# Patient Record
Sex: FEMALE | Race: WHITE | Employment: OTHER | URBAN - METROPOLITAN AREA
[De-identification: names, ages, dates, MRNs, and addresses within clinical notes are randomized per-mention and may not be internally consistent; named-entity substitution may affect disease eponyms.]

---

## 2017-01-10 RX ORDER — MELOXICAM 15 MG/1
TABLET ORAL
Qty: 90 TAB | Refills: 3 | Status: SHIPPED | OUTPATIENT
Start: 2017-01-10 | End: 2017-04-28 | Stop reason: ALTCHOICE

## 2017-01-27 RX ORDER — LEVOTHYROXINE SODIUM 150 UG/1
TABLET ORAL
Qty: 90 TAB | Refills: 1 | Status: SHIPPED | OUTPATIENT
Start: 2017-01-27 | End: 2017-08-22 | Stop reason: SDUPTHER

## 2017-01-27 RX ORDER — LEVOTHYROXINE SODIUM 150 UG/1
TABLET ORAL
Qty: 90 TAB | Refills: 1 | Status: CANCELLED | OUTPATIENT
Start: 2017-01-27

## 2017-02-14 ENCOUNTER — OFFICE VISIT (OUTPATIENT)
Dept: INTERNAL MEDICINE CLINIC | Age: 67
End: 2017-02-14

## 2017-02-14 VITALS
WEIGHT: 159.8 LBS | OXYGEN SATURATION: 98 % | HEART RATE: 108 BPM | SYSTOLIC BLOOD PRESSURE: 132 MMHG | DIASTOLIC BLOOD PRESSURE: 82 MMHG | BODY MASS INDEX: 31.37 KG/M2 | HEIGHT: 60 IN | TEMPERATURE: 98.1 F | RESPIRATION RATE: 20 BRPM

## 2017-02-14 DIAGNOSIS — M17.12 PRIMARY OSTEOARTHRITIS OF LEFT KNEE: ICD-10-CM

## 2017-02-14 DIAGNOSIS — K21.9 GASTROESOPHAGEAL REFLUX DISEASE WITHOUT ESOPHAGITIS: ICD-10-CM

## 2017-02-14 DIAGNOSIS — E03.8 HYPOTHYROIDISM DUE TO HASHIMOTO'S THYROIDITIS: ICD-10-CM

## 2017-02-14 DIAGNOSIS — E06.3 HYPOTHYROIDISM DUE TO HASHIMOTO'S THYROIDITIS: ICD-10-CM

## 2017-02-14 DIAGNOSIS — Z01.818 PREOP EXAM FOR INTERNAL MEDICINE: Primary | ICD-10-CM

## 2017-02-14 NOTE — MR AVS SNAPSHOT
Visit Information Date & Time Provider Department Dept. Phone Encounter #  
 2/14/2017  2:30 PM Chilo Ornelas MD Internist of [address] [id number] Upcoming Health Maintenance Date Due DTaP/Tdap/Td series (1 - Tdap) 4/22/2011 MEDICARE YEARLY EXAM 2/2/2016 Pneumococcal 65+ Low/Medium Risk (2 of 2 - PPSV23) 11/11/2017 GLAUCOMA SCREENING Q2Y 12/17/2017 BREAST CANCER SCRN MAMMOGRAM 12/30/2018 COLONOSCOPY 1/16/2027 Allergies as of 2/14/2017  Review Complete On: 2/14/2017 By: Chilo Ornelas MD  
  
 Severity Noted Reaction Type Reactions Lipitor [Atorvastatin]    Unknown (comments) Myalgia Septra [Sulfamethoprim Ds]    Rash  
 Sulfa (Sulfonamide Antibiotics)  10/19/2012    Hives  
 respiratory distress Current Immunizations  Reviewed on 11/11/2016 Name Date Influenza Vaccine (Quad) PF 11/11/2016  2:31 PM, 11/9/2015  3:05 PM  
 Influenza Vaccine PF 10/29/2014  2:59 PM, 10/24/2013 11:18 AM  
 Influenza Vaccine Split 10/19/2012 10:16 AM, 10/11/2011 Influenza Vaccine Whole 9/24/2010 Pneumococcal Conjugate (PCV-13) 11/11/2016  2:31 PM  
 TD Vaccine 4/21/2011 Zoster Vaccine, Live 10/24/2013 11:20 AM  
  
 Not reviewed this visit You Were Diagnosed With   
  
 Codes Comments Preop exam for internal medicine    -  Primary ICD-10-CM: A01.826 ICD-9-CM: V72.83 Primary osteoarthritis of left knee     ICD-10-CM: M17.12 
ICD-9-CM: 715.16 Vitals BP Pulse Temp Resp Height(growth percentile) Weight(growth percentile) 132/82 (BP 1 Location: Left arm, BP Patient Position: Sitting) (!) 108 98.1 °F (36.7 °C) (Oral) 20 5' (1.524 m) 159 lb 12.8 oz (72.5 kg) SpO2 BMI OB Status Smoking Status 98% 31.21 kg/m2 Hysterectomy Never Smoker BMI and BSA Data Body Mass Index Body Surface Area  
 31.21 kg/m 2 1.75 m 2 Preferred Pharmacy Pharmacy Name Phone 42 [facility] 994 [phone] Your Updated Medication List  
  
   
This list is accurate as of: 2/14/17  3:02 PM.  Always use your most recent med list.  
  
  
  
  
 conjugated estrogens 0.625 mg tablet Commonly known as:  PREMARIN Take 1 Tab by mouth daily. esomeprazole 40 mg capsule Commonly known as:  NexIUM Take 1 Cap by mouth daily. levothyroxine 150 mcg tablet Commonly known as:  SYNTHROID  
take 1 tablet by mouth once daily  
  
 meloxicam 15 mg tablet Commonly known as:  MOBIC  
take 1 tablet by mouth once daily  
  
 traMADol 50 mg tablet Commonly known as:  ULTRAM  
Take 1 Tab by mouth every six (6) hours as needed for Pain. Max Daily Amount: 200 mg. Introducing [facility]! Dear Jennyfer Listen: Thank you for requesting a [employer] account. Our records indicate that you already have an active [employer] account. You can access your account anytime at [email or web address] Did you know that you can access your hospital and ER discharge instructions at any time in i[employer]O? You can also review all of your test results from your hospital stay or ER visit. Additional Information If you have questions, please visit the Frequently Asked Questions section of the [employer] website at [email or web address] Remember, [employer]O is NOT to be used for urgent needs. For medical emergencies, dial 911. Now available from your iPhone and Android! Please provide this summary of care documentation to your next provider. Your primary care clinician is listed as Sabino Mojica. If you have any questions after today's visit, please call [phone].

## 2017-02-14 NOTE — PROGRESS NOTES
Fabio Knight 1950, is a 78 y.o. female, who is seen today for preoperative evaluation prior to having left total knee replacement 7 days from today. She has had progressively more problems getting around with a painful knee over the last few years to the point where she is limited her activities somewhat more recently. She has been using meloxicam but stopped using that yesterday in preparation for the surgery. She rarely uses tramadol but sometimes to get to sleep at night she will use that. She otherwise feels quite well. Past Medical History   Diagnosis Date    Allergic rhinitis     Asthma     Diverticulosis     DJD (degenerative joint disease)     DJD (degenerative joint disease)     GERD (gastroesophageal reflux disease)     Headache(784.0)     Hyperlipidemia     Hypothyroidism     Overweight(278.02)      with weight gain    Right knee pain      Past Surgical History   Procedure Laterality Date    Hx appendectomy      Hx cholecystectomy      Endoscopy, colon, diagnostic      Hx hysterectomy      Hx tubal ligation      Hx tonsillectomy      Hx orthopaedic       left knee    Pr breast surgery procedure unlisted       reduction mammoplasty    Hx knee arthroscopy       Current Outpatient Prescriptions   Medication Sig Dispense Refill    levothyroxine (SYNTHROID) 150 mcg tablet take 1 tablet by mouth once daily 90 Tab 1    meloxicam (MOBIC) 15 mg tablet take 1 tablet by mouth once daily 90 Tab 3    traMADol (ULTRAM) 50 mg tablet Take 1 Tab by mouth every six (6) hours as needed for Pain. Max Daily Amount: 200 mg. 60 Tab 4    conjugated estrogens (PREMARIN) 0.625 mg tablet Take 1 Tab by mouth daily. 90 Tab 3    esomeprazole (NEXIUM) 40 mg capsule Take 1 Cap by mouth daily.  90 Cap 3   (she stopped using meloxicam yesterday.)    Allergies   Allergen Reactions    Lipitor [Atorvastatin] Unknown (comments)     Myalgia      Septra [Sulfamethoprim Ds] Rash    Sulfa (Sulfonamide Antibiotics) Hives     respiratory distress     Social History     Social History    Marital status:      Spouse name: N/A    Number of children: N/A    Years of education: N/A     Social History Main Topics    Smoking status: Never Smoker    Smokeless tobacco: Never Used    Alcohol use Yes      Comment: rarely    Drug use: No    Sexual activity: Not Asked     Other Topics Concern    None     Social History Narrative     Review of systems: She denies exertional discomfort in the chest neck jaw back or arm. No palpitations. She has no dyspnea on exertion PND orthopnea or edema. Visit Vitals    /82 (BP 1 Location: Left arm, BP Patient Position: Sitting)    Pulse (!) 108    Temp 98.1 °F (36.7 °C) (Oral)    Resp 20    Ht 5' (1.524 m)    Wt 159 lb 12.8 oz (72.5 kg)    SpO2 98%    BMI 31.21 kg/m2     Carotids are 2+ without bruits. No adenopathy or thyromegaly. Lungs are clear to percussion. Good breath sounds with no wheezing or crackles. Heart reveals a regular rhythm with normal S1 and S2 no murmur gallop click or rub. Apical impulse is not palpable. Abdomen is soft and nontender with no hepatosplenomegaly or masses and no bruits. Extremities reveal no clubbing cyanosis or edema. Pulses are 2+ throughout. EKG from November 11, 2016 shows normal sinus rhythm at 60 bpm, left axis deviation, slow R-wave progression and low voltage in the limb leads. Laboratory from November 5 showed normal CBC and basic chemistries. Assessment: End-stage osteoarthritis of the left knee, no major contraindications to proposed surgery. She is medically cleared for left total knee replacement and associated anesthetic. She will use tramadol as needed preoperatively and will continue to avoid meloxicam as well as all other nonsteroidal agents and aspirin. She may use Tylenol. She understands. #2. Hypothyroidism with normal TSH and clinically euthyroid.   She will continue levothyroxine 150 µg daily. #3.  GERD asymptomatic. She will continue Nexium 40 mg daily. Americo Merritt MD FACP    Please note: This document has been produced using voice recognition software. Unrecognized errors in transcription may be present.

## 2017-02-14 NOTE — PROGRESS NOTES
1. Have you been to the ER, urgent care clinic or hospitalized since your last visit?  no    2. Have you seen or consulted any other health care providers outside of the [address] [place] [address] since your last visit (Include any pap smears or colon screening)? Yes renée Olsen    Do you have an Advanced Directive? no    Would you like information on Advanced Directives?  no

## 2017-02-20 RX ORDER — TRAMADOL HYDROCHLORIDE 50 MG/1
TABLET ORAL
Qty: 60 TAB | Refills: 4 | Status: SHIPPED | OUTPATIENT
Start: 2017-02-20 | End: 2017-03-01

## 2017-03-28 RX ORDER — CIPROFLOXACIN 500 MG/1
500 TABLET ORAL 2 TIMES DAILY
Qty: 20 TAB | Refills: 0 | Status: SHIPPED | OUTPATIENT
Start: 2017-03-28 | End: 2017-04-05 | Stop reason: ALTCHOICE

## 2017-03-28 RX ORDER — METRONIDAZOLE 500 MG/1
500 TABLET ORAL 3 TIMES DAILY
Qty: 30 TAB | Refills: 0 | Status: SHIPPED | OUTPATIENT
Start: 2017-03-28 | End: 2017-04-05 | Stop reason: ALTCHOICE

## 2017-04-05 ENCOUNTER — OFFICE VISIT (OUTPATIENT)
Dept: INTERNAL MEDICINE CLINIC | Age: 67
End: 2017-04-05

## 2017-04-05 VITALS
BODY MASS INDEX: 29.09 KG/M2 | OXYGEN SATURATION: 97 % | HEIGHT: 60 IN | TEMPERATURE: 97.9 F | SYSTOLIC BLOOD PRESSURE: 124 MMHG | WEIGHT: 148.2 LBS | DIASTOLIC BLOOD PRESSURE: 80 MMHG | RESPIRATION RATE: 12 BRPM | HEART RATE: 102 BPM

## 2017-04-05 DIAGNOSIS — M17.12 PRIMARY OSTEOARTHRITIS OF LEFT KNEE: ICD-10-CM

## 2017-04-05 DIAGNOSIS — Z01.818 PREOP EXAM FOR INTERNAL MEDICINE: Primary | ICD-10-CM

## 2017-04-05 DIAGNOSIS — K57.33 DIVERTICULITIS LARGE INTESTINE W/O PERFORATION OR ABSCESS W/BLEEDING: ICD-10-CM

## 2017-04-05 NOTE — MR AVS SNAPSHOT
Visit Information Date & Time Provider Department Dept. Phone Encounter #  
 4/5/2017  2:30 PM Darren Barragan MD Internist of [address] [id number] Upcoming Health Maintenance Date Due DTaP/Tdap/Td series (1 - Tdap) 4/22/2011 MEDICARE YEARLY EXAM 2/2/2016 Pneumococcal 65+ Low/Medium Risk (2 of 2 - PPSV23) 11/11/2017 GLAUCOMA SCREENING Q2Y 12/17/2017 BREAST CANCER SCRN MAMMOGRAM 12/30/2018 COLONOSCOPY 1/16/2027 Allergies as of 4/5/2017  Review Complete On: 4/5/2017 By: Darren Barragan MD  
  
 Severity Noted Reaction Type Reactions Lipitor [Atorvastatin]    Unknown (comments) Myalgia Septra [Sulfamethoprim Ds]    Rash  
 Sulfa (Sulfonamide Antibiotics)  10/19/2012    Hives  
 respiratory distress Current Immunizations  Reviewed on 11/11/2016 Name Date Influenza Vaccine (Quad) PF 11/11/2016  2:31 PM, 11/9/2015  3:05 PM  
 Influenza Vaccine PF 10/29/2014  2:59 PM, 10/24/2013 11:18 AM  
 Influenza Vaccine Split 10/19/2012 10:16 AM, 10/11/2011 Influenza Vaccine Whole 9/24/2010 Pneumococcal Conjugate (PCV-13) 11/11/2016  2:31 PM  
 TD Vaccine 4/21/2011 Zoster Vaccine, Live 10/24/2013 11:20 AM  
  
 Not reviewed this visit You Were Diagnosed With   
  
 Codes Comments Preop exam for internal medicine    -  Primary ICD-10-CM: D80.949 ICD-9-CM: V72.83 Primary osteoarthritis of left knee     ICD-10-CM: M17.12 
ICD-9-CM: 715.16 Diverticulitis large intestine w/o perforation or abscess w/bleeding     ICD-10-CM: K57.33 
ICD-9-CM: 562.13 Vitals BP Pulse Temp Resp Height(growth percentile) Weight(growth percentile) 124/80 (!) 102 97.9 °F (36.6 °C) (Oral) 12 5' (1.524 m) 148 lb 3.2 oz (67.2 kg) SpO2 BMI OB Status Smoking Status 97% 28.94 kg/m2 Hysterectomy Never Smoker Vitals History BMI and BSA Data  Body Mass Index Body Surface Area  
 28.94 kg/m 2 1.69 m 2  
  
  
 [phone] Your Updated Medication List  
  
   
This list is accurate as of: 4/5/17  3:19 PM.  Always use your most recent med list.  
  
  
  
  
 aspirin 325 mg tablet Commonly known as:  ASPIRIN Take 325 mg by mouth two (2) times a day. conjugated estrogens 0.625 mg tablet Commonly known as:  PREMARIN Take 1 Tab by mouth daily. esomeprazole 40 mg capsule Commonly known as:  NexIUM Take 1 Cap by mouth daily. levothyroxine 150 mcg tablet Commonly known as:  SYNTHROID  
take 1 tablet by mouth once daily  
  
 meloxicam 15 mg tablet Commonly known as:  MOBIC  
take 1 tablet by mouth once daily NORCO 7.5-325 mg per tablet Generic drug:  HYDROcodone-acetaminophen Take 1 Tab by mouth every four (4) hours as needed for Pain. Introducing [facility]! Dear Lisa Simeon: Thank you for requesting a [employer] account. Our records indicate that you already have an active Learn[employer] account. You can access your account anytime at [email or web address] Did you know that you can access your hospital and ER discharge instructions at any time in LearnVest? You can also review all of your test results from your hospital stay or ER visit. Additional Information If you have questions, please visit the Frequently Asked Questions section of the LearnVest website at [email or web address] Remember, LearnVest is NOT to be used for urgent needs. For medical emergencies, dial 911. Now available from your iPhone and Android! Please provide this summary of care documentation to your next provider. Your primary care clinician is listed as Marium Garcia. If you have any questions after today's visit, please call [phone].

## 2017-04-05 NOTE — PROGRESS NOTES
Candi Pineda 1950, is a 78 y.o. female, who is seen today for preoperative evaluation prior to having left total knee replacement. She has had significant arthritis in both knees but the left has been worse than the right. Prior to having knee replacement she ended up with sepsis and infection in the left knee which was debrided and she was treated with intravenous antibiotics for several weeks. She is now  going to undergo total replacement, tentatively planned for April 11 to be done by Dr. Winsome Barker. She otherwise has been feeling quite well but has been experiencing quite a bit of pain in that knee. She is now using one half of a hydrocodone tablet every 4 hours through the day and that helps her considerably. She stopped using meloxicam about 2 months ago and has been off aspirin since April 1. Past Medical History:   Diagnosis Date    Allergic rhinitis     Asthma     Diverticulosis     DJD (degenerative joint disease)     DJD (degenerative joint disease)     GERD (gastroesophageal reflux disease)     Headache     Hyperlipidemia     Hypothyroidism     Overweight     with weight gain    Right knee pain      Past Surgical History:   Procedure Laterality Date    BREAST SURGERY PROCEDURE UNLISTED      reduction mammoplasty    ENDOSCOPY, COLON, DIAGNOSTIC      HX APPENDECTOMY      HX CHOLECYSTECTOMY      HX HYSTERECTOMY      HX KNEE ARTHROSCOPY      HX ORTHOPAEDIC      left knee    HX TONSILLECTOMY      HX TUBAL LIGATION       Current Outpatient Prescriptions   Medication Sig Dispense Refill    HYDROcodone-acetaminophen (NORCO) 7.5-325 mg per tablet Take 1 Tab by mouth every four (4) hours as needed for Pain.  levothyroxine (SYNTHROID) 150 mcg tablet take 1 tablet by mouth once daily 90 Tab 1    conjugated estrogens (PREMARIN) 0.625 mg tablet Take 1 Tab by mouth daily. 90 Tab 3    esomeprazole (NEXIUM) 40 mg capsule Take 1 Cap by mouth daily.  90 Cap 3    aspirin (ASPIRIN) 325 mg tablet Take 325 mg by mouth two (2) times a day.  meloxicam (MOBIC) 15 mg tablet take 1 tablet by mouth once daily 90 Tab 3   (she is using her Premarin 3 times a week and is not currently using meloxicam or aspirin.)    Allergies   Allergen Reactions    Lipitor [Atorvastatin] Unknown (comments)     Myalgia      Septra [Sulfamethoprim Ds] Rash    Sulfa (Sulfonamide Antibiotics) Hives     respiratory distress     Social History     Social History    Marital status:      Spouse name: N/A    Number of children: N/A    Years of education: N/A     Social History Main Topics    Smoking status: Never Smoker    Smokeless tobacco: Never Used    Alcohol use Yes      Comment: rarely    Drug use: No    Sexual activity: Not Asked     Other Topics Concern    None     Social History Narrative     Review of systems: She denies exertional discomfort in the chest neck jaw back or arm. She denies dyspnea on exertion PND orthopnea or edema. Visit Vitals    /80    Pulse (!) 102    Temp 97.9 °F (36.6 °C) (Oral)    Resp 12    Ht 5' (1.524 m)    Wt 148 lb 3.2 oz (67.2 kg)    SpO2 97%    BMI 28.94 kg/m2     Carotids are 2+ without bruits. Lungs are clear to percussion. Good breath sounds with no wheezing or crackles. Heart reveals a regular rhythm with normal S1 and S2 no murmur gallop click or rub. Apical impulse is not palpable. Abdomen is soft and nontender with no hepatosplenomegaly or masses and no bruits. Extremities reveal no clubbing cyanosis or edema. Pulses are 2+ throughout. She has a well-healed incision over the left knee but it does not fully extend. Assessment: End-stage DJD left knee with recent infection. There are no major contraindications to total knee replacement and she is medically cleared for the surgery and associated anesthetic. Our office will manage warfarin postop for a planned 4 week treatment duration.   She will remain off aspirin and all nonsteroidals and will rely on hydrocodone for pain control. Americo Bernard MD FACP    Please note: This document has been produced using voice recognition software. Unrecognized errors in transcription may be present.

## 2017-04-17 NOTE — TELEPHONE ENCOUNTER
From: Florina Mukherjee  To: Darren Barragan MD  Sent: 4/17/2017 11:28 AM EDT  Subject: Medication Renewal Request    Original authorizing provider: MD Florina Infante would like a refill of the following medications:  conjugated estrogens (PREMARIN) 0.625 mg tablet Darren Barragan MD]    Preferred pharmacy: [address] [place] [address] Street:

## 2017-04-28 ENCOUNTER — OFFICE VISIT (OUTPATIENT)
Dept: INTERNAL MEDICINE CLINIC | Age: 67
End: 2017-04-28

## 2017-04-28 VITALS
OXYGEN SATURATION: 95 % | BODY MASS INDEX: 27.61 KG/M2 | SYSTOLIC BLOOD PRESSURE: 110 MMHG | WEIGHT: 140.6 LBS | HEART RATE: 67 BPM | RESPIRATION RATE: 12 BRPM | HEIGHT: 60 IN | TEMPERATURE: 97.6 F | DIASTOLIC BLOOD PRESSURE: 82 MMHG

## 2017-04-28 DIAGNOSIS — F51.01 PRIMARY INSOMNIA: Primary | ICD-10-CM

## 2017-04-28 DIAGNOSIS — M25.562 LEFT KNEE PAIN, UNSPECIFIED CHRONICITY: ICD-10-CM

## 2017-04-28 RX ORDER — LORAZEPAM 1 MG/1
TABLET ORAL
Qty: 30 TAB | Refills: 0 | Status: SHIPPED | OUTPATIENT
Start: 2017-04-28 | End: 2017-05-23 | Stop reason: SDUPTHER

## 2017-04-28 NOTE — PROGRESS NOTES
Iona Jackson 1950, is a 78 y.o. female, who is seen today for reevaluation after recent left total knee replacement. The knee was replaced on April 11 and she is getting around on it better and better over time with her cane. She only uses Norco at bedtime. She typically is waking up about 2 hours after she goes to sleep and having some nausea and cannot sleep. She has not had swelling in the leg. She was treated with aspirin twice daily rather than warfarin. She will finish that in about 2 weeks. She did have a cough from the day she got out of the hospital until 5 days ago but is no longer coughing and is not short of breath. No chest pain. Past Medical History:   Diagnosis Date    Allergic rhinitis     Asthma     Diverticulosis     DJD (degenerative joint disease)     DJD (degenerative joint disease)     GERD (gastroesophageal reflux disease)     Headache     Hyperlipidemia     Hypothyroidism     Overweight     with weight gain    Right knee pain      Current Outpatient Prescriptions   Medication Sig Dispense Refill    LORazepam (ATIVAN) 1 mg tablet 1 tablet at bedtime as needed for sleep 30 Tab 0    conjugated estrogens (PREMARIN) 0.625 mg tablet Take 1 Tab by mouth daily. 90 Tab 3    aspirin (ASPIRIN) 325 mg tablet Take 325 mg by mouth two (2) times a day.  HYDROcodone-acetaminophen (NORCO) 7.5-325 mg per tablet Take 1 Tab by mouth every four (4) hours as needed for Pain.  levothyroxine (SYNTHROID) 150 mcg tablet take 1 tablet by mouth once daily 90 Tab 1    esomeprazole (NEXIUM) 40 mg capsule Take 1 Cap by mouth daily. 90 Cap 3     Visit Vitals    /82    Pulse 67    Temp 97.6 °F (36.4 °C) (Oral)    Resp 12    Ht 5' (1.524 m)    Wt 140 lb 9.6 oz (63.8 kg)    SpO2 95%    BMI 27.46 kg/m2     Lungs are clear to percussion. Good breath sounds with no wheezing or crackles.   The incision over the left knee is healing nicely, and is are opposed and strips are still in place. There is no redness. No edema in the lower leg ankle or foot. No tenderness in the calf. Pulses are 2+ in the foot on the left. Assessment: #1. Doing quite well after recent left knee replacement. She will continue rehabilitation and will continue aspirin twice daily for another 2 weeks. She will use Norco as needed at bedtime. #2. Insomnia. This may be partly related to nausea, you might be from Elena Asifwilbert.  I discussed this at length and she will try lorazepam 1 mg at bedtime over the next couple of weeks. The half-life of clonazepam is probably perfect for her problem, long enough but not so long as to make her sleepy the next day. Follow-up as previously planned    Otilio Schmitz. Abhijeet Garcia MD FACP    Please note: This document has been produced using voice recognition software. Unrecognized errors in transcription may be present. This visit lasted 25 minutes, greater than 50% spent counseling on caring for the knee, treating the insomnia, treating the pain.

## 2017-04-28 NOTE — MR AVS SNAPSHOT
Visit Information Date & Time Provider Department Dept. Phone Encounter #  
 4/28/2017  2:30 PM Aixa Carroll MD Internist of [address] [id number] Upcoming Health Maintenance Date Due DTaP/Tdap/Td series (1 - Tdap) 4/22/2011 MEDICARE YEARLY EXAM 2/2/2016 Pneumococcal 65+ Low/Medium Risk (2 of 2 - PPSV23) 11/11/2017 GLAUCOMA SCREENING Q2Y 12/17/2017 BREAST CANCER SCRN MAMMOGRAM 12/30/2018 COLONOSCOPY 1/16/2027 Allergies as of 4/28/2017  Review Complete On: 4/28/2017 By: Aixa Carroll MD  
  
 Severity Noted Reaction Type Reactions Lipitor [Atorvastatin]    Unknown (comments) Myalgia Septra [Sulfamethoprim Ds]    Rash  
 Sulfa (Sulfonamide Antibiotics)  10/19/2012    Hives  
 respiratory distress Current Immunizations  Reviewed on 11/11/2016 Name Date Influenza Vaccine (Quad) PF 11/11/2016  2:31 PM, 11/9/2015  3:05 PM  
 Influenza Vaccine PF 10/29/2014  2:59 PM, 10/24/2013 11:18 AM  
 Influenza Vaccine Split 10/19/2012 10:16 AM, 10/11/2011 Influenza Vaccine Whole 9/24/2010 Pneumococcal Conjugate (PCV-13) 11/11/2016  2:31 PM  
 TD Vaccine 4/21/2011 Zoster Vaccine, Live 10/24/2013 11:20 AM  
  
 Not reviewed this visit You Were Diagnosed With   
  
 Codes Comments Primary insomnia    -  Primary ICD-10-CM: F51.01 
ICD-9-CM: 307.42 Left knee pain, unspecified chronicity     ICD-10-CM: V78.732 ICD-9-CM: 719.46 Vitals BP Pulse Temp Resp Height(growth percentile) Weight(growth percentile) 110/82 67 97.6 °F (36.4 °C) (Oral) 12 5' (1.524 m) 140 lb 9.6 oz (63.8 kg) SpO2 BMI OB Status Smoking Status 95% 27.46 kg/m2 Hysterectomy Never Smoker Vitals History BMI and BSA Data Body Mass Index Body Surface Area  
 27.46 kg/m 2 1.64 m 2 Preferred Pharmacy Pharmacy Name Phone [address] [facility], [address] [place] [address] [phone] Your Updated Medication List  
  
   
This list is accurate as of: 17  2:31 PM.  Always use your most recent med list.  
  
  
  
  
 aspirin 325 mg tablet Commonly known as:  ASPIRIN Take 325 mg by mouth two (2) times a day. conjugated estrogens 0.625 mg tablet Commonly known as:  PREMARIN Take 1 Tab by mouth daily. esomeprazole 40 mg capsule Commonly known as:  NexIUM Take 1 Cap by mouth daily. levothyroxine 150 mcg tablet Commonly known as:  SYNTHROID  
take 1 tablet by mouth once daily LORazepam 1 mg tablet Commonly known as:  ATIVAN  
1 tablet at bedtime as needed for sleep NORCO 7.5-325 mg per tablet Generic drug:  HYDROcodone-acetaminophen Take 1 Tab by mouth every four (4) hours as needed for Pain. Prescriptions Printed Refills LORazepam (ATIVAN) 1 mg tablet 0 Si tablet at bedtime as needed for sleep Class: Print Introducing [facility]! Dear Citlalli Wheeler: Thank you for requesting a [facility] account. Our records indicate that you already have an active [facility] account. You can access your account anytime at [email or web address]. [email or web address] Did you know that you can access your hospital and ER discharge instructions at any time in [facility]? You can also review all of your test results from your hospital stay or ER visit. Additional Information If you have questions, please visit the Frequently Asked Questions section of the [facility] website at [email or web address] Remember, [facility] is NOT to be used for urgent needs. For medical emergencies, dial 911. Now available from your iPhone and Android! Please provide this summary of care documentation to your next provider. Your primary care clinician is listed as Maribell Knapp. Cande Sherwood. If you have any questions after today's visit, please call [phone].

## 2017-05-23 RX ORDER — LORAZEPAM 1 MG/1
TABLET ORAL
Qty: 30 TAB | Refills: 0 | OUTPATIENT
Start: 2017-05-23 | End: 2017-06-26 | Stop reason: SDUPTHER

## 2017-05-23 NOTE — TELEPHONE ENCOUNTER
From: Jorge Dhillon  To: Dani Zuleta MD  Sent: 5/23/2017 2:05 PM EDT  Subject: Medication Renewal Request    Original authorizing provider: MD Jorge Fontana would like a refill of the following medications:  LORazepam (ATIVAN) 1 mg tablet Dani Zuleta MD]    Preferred pharmacy: [address] [place] [address] Street:

## 2017-06-16 ENCOUNTER — OFFICE VISIT (OUTPATIENT)
Dept: INTERNAL MEDICINE CLINIC | Age: 67
End: 2017-06-16

## 2017-06-16 VITALS
SYSTOLIC BLOOD PRESSURE: 106 MMHG | TEMPERATURE: 98.1 F | HEIGHT: 60 IN | DIASTOLIC BLOOD PRESSURE: 74 MMHG | OXYGEN SATURATION: 96 % | HEART RATE: 99 BPM | BODY MASS INDEX: 26.78 KG/M2 | WEIGHT: 136.4 LBS | RESPIRATION RATE: 12 BRPM

## 2017-06-16 DIAGNOSIS — R10.84 GENERALIZED POSTPRANDIAL ABDOMINAL PAIN: Primary | ICD-10-CM

## 2017-06-16 DIAGNOSIS — R10.13 EPIGASTRIC PAIN: ICD-10-CM

## 2017-06-16 DIAGNOSIS — R63.4 WEIGHT LOSS, ABNORMAL: ICD-10-CM

## 2017-06-16 RX ORDER — OMEPRAZOLE 40 MG/1
CAPSULE, DELAYED RELEASE ORAL
Qty: 60 CAP | Refills: 1 | Status: SHIPPED | OUTPATIENT
Start: 2017-06-16 | End: 2017-09-06 | Stop reason: SDUPTHER

## 2017-06-16 NOTE — MR AVS SNAPSHOT
Visit Information Date & Time Provider Department Dept. Phone Encounter #  
 6/16/2017  2:30 PM Ritu Soto MD Internist of [address] [id number] Upcoming Health Maintenance Date Due DTaP/Tdap/Td series (1 - Tdap) 4/22/2011 MEDICARE YEARLY EXAM 2/2/2016 INFLUENZA AGE 9 TO ADULT 8/1/2017 Pneumococcal 65+ Low/Medium Risk (2 of 2 - PPSV23) 11/11/2017 BREAST CANCER SCRN MAMMOGRAM 12/30/2018 GLAUCOMA SCREENING Q2Y 6/1/2019 COLONOSCOPY 1/16/2027 Allergies as of 6/16/2017  Review Complete On: 6/16/2017 By: Ritu Soto MD  
  
 Severity Noted Reaction Type Reactions Lipitor [Atorvastatin]    Unknown (comments) Myalgia Septra [Sulfamethoprim Ds]    Rash  
 Sulfa (Sulfonamide Antibiotics)  10/19/2012    Hives  
 respiratory distress Current Immunizations  Reviewed on 11/11/2016 Name Date Influenza Vaccine (Quad) PF 11/11/2016  2:31 PM, 11/9/2015  3:05 PM  
 Influenza Vaccine PF 10/29/2014  2:59 PM, 10/24/2013 11:18 AM  
 Influenza Vaccine Split 10/19/2012 10:16 AM, 10/11/2011 Influenza Vaccine Whole 9/24/2010 Pneumococcal Conjugate (PCV-13) 11/11/2016  2:31 PM  
 TD Vaccine 4/21/2011 Zoster Vaccine, Live 10/24/2013 11:20 AM  
  
 Not reviewed this visit You Were Diagnosed With   
  
 Codes Comments Generalized postprandial abdominal pain    -  Primary ICD-10-CM: R10.84 ICD-9-CM: 789.07 Epigastric pain     ICD-10-CM: R10.13 ICD-9-CM: 789.06 Weight loss, abnormal     ICD-10-CM: R63.4 ICD-9-CM: 783.21 Vitals BP Pulse Temp Resp Height(growth percentile) Weight(growth percentile) 106/74 99 98.1 °F (36.7 °C) (Oral) 12 5' (1.524 m) 136 lb 6.4 oz (61.9 kg) SpO2 BMI OB Status Smoking Status 96% 26.64 kg/m2 Hysterectomy Never Smoker Vitals History BMI and BSA Data Body Mass Index Body Surface Area  
 26.64 kg/m 2 1.62 m 2 Preferred Pharmacy Pharmacy Name Phone [address] [place] [address] [phone] Your Updated Medication List  
  
   
This list is accurate as of: 17  3:14 PM.  Always use your most recent med list.  
  
  
  
  
 aspirin 325 mg tablet Commonly known as:  ASPIRIN Take 325 mg by mouth two (2) times a day. conjugated estrogens 0.625 mg tablet Commonly known as:  PREMARIN Take 1 Tab by mouth daily. esomeprazole 40 mg capsule Commonly known as:  NexIUM Take 1 Cap by mouth daily. levothyroxine 150 mcg tablet Commonly known as:  SYNTHROID  
take 1 tablet by mouth once daily LORazepam 1 mg tablet Commonly known as:  ATIVAN  
1 tablet at bedtime as needed for sleep NORCO 7.5-325 mg per tablet Generic drug:  HYDROcodone-acetaminophen Take 1 Tab by mouth every four (4) hours as needed for Pain. omeprazole 40 mg capsule Commonly known as:  PRILOSEC  
1 capsule by mouth twice daily Prescriptions Sent to Pharmacy Refills  
 omeprazole (PRILOSEC) 40 mg capsule 1 Si capsule by mouth twice daily Class: Normal  
 Pharmacy: [address] [place] [address] #: [phone] [facility]! Dear [facility]: Thank you for requesting a Propel account. Our records indicate that you already have an active Propel account. You can access your account anytime at [email or web address] Did you know that you can access your hospital and ER discharge instructions at any time in Propel? You can also review all of your test results from your hospital stay or ER visit. Additional Information If you have questions, please visit the Frequently Asked Questions section of the Propel website at [email or web address] Remember, Propel is NOT to be used for urgent needs.  For medical emergencies, dial 911. Now available from your iPhone and Android! Please provide this summary of care documentation to your next provider. Your primary care clinician is listed as Cristina Palencia. Neha Brown. If you have any questions after today's visit, please call [phone].

## 2017-06-16 NOTE — PROGRESS NOTES
Barry Sullivan 1950, is a 78 y.o. female, who is seen today for abdominal pain. For the last 4 months she has been having epigastric pain that at times is quite severe postprandially. It may last for a couple of hours. She got Maalox was helping a little bit but caused loose stools and most recently and this has not helped at all. She was on over-the-counter Nexium for a while and last few days has been on over-the-counter Prevacid without benefit. She has slight nausea associated with the pain sometimes. Bowels are working well. The pain is located only in the epigastrium. By that time the pain is gone and it does not wake her up at night. She had been on an aspirin regimen and the orthopedist decrease the dose to 81 mg daily more recently because of this pain. She has lost 20 pounds compared with October, 12 of these pounds but lost in the last 2 months. She is afraid to eat lunch because of the pain and for breakfast she has been drinking boost lately. Past Medical History:   Diagnosis Date    Allergic rhinitis     Asthma     Diverticulosis     DJD (degenerative joint disease)     DJD (degenerative joint disease)     GERD (gastroesophageal reflux disease)     Headache     Hyperlipidemia     Hypothyroidism     Overweight     with weight gain    Right knee pain      Current Outpatient Prescriptions   Medication Sig Dispense Refill    LORazepam (ATIVAN) 1 mg tablet 1 tablet at bedtime as needed for sleep 30 Tab 0    conjugated estrogens (PREMARIN) 0.625 mg tablet Take 1 Tab by mouth daily. 90 Tab 3    HYDROcodone-acetaminophen (NORCO) 7.5-325 mg per tablet Take 1 Tab by mouth every four (4) hours as needed for Pain.  levothyroxine (SYNTHROID) 150 mcg tablet take 1 tablet by mouth once daily 90 Tab 1    esomeprazole (NEXIUM) 40 mg capsule Take 1 Cap by mouth daily. 90 Cap 3    aspirin (ASPIRIN) 325 mg tablet Take 325 mg by mouth two (2) times a day.        Visit Vitals  /74    Pulse 99    Temp 98.1 °F (36.7 °C) (Oral)    Resp 12    Ht 5' (1.524 m)    Wt 136 lb 6.4 oz (61.9 kg)    SpO2 96%    BMI 26.64 kg/m2     There is no tympany or distention. Normoactive bowel sounds. Tenderness in the epigastrium but no other tenderness. No hepatosplenomegaly or masses. She had a CT scan in October that showed diverticulitis but did not show anything in the upper abdomen including the pancreas. Assessment: Postprandial epigastric pain quite severe associated with follow-up on weight loss in the last 2 months. She will continue boost in the morning and will be started on omeprazole 40 mg twice daily. If the pain is not at least 50% better in 10 days to proceed with CT of the abdomen with contrast which also will look at vascularity for possible intestinal angina even though it is just epigastric pain and if that was negative I would have her see a gastroenterologist.    Follow-up otherwise as previously planned    Shania Brown MD FACP    Please note: This document has been produced using voice recognition software. Unrecognized errors in transcription may be present.       .rrmdr

## 2017-06-26 ENCOUNTER — TELEPHONE (OUTPATIENT)
Dept: INTERNAL MEDICINE CLINIC | Age: 67
End: 2017-06-26

## 2017-06-26 DIAGNOSIS — R10.84 GENERALIZED ABDOMINAL PAIN: ICD-10-CM

## 2017-06-26 DIAGNOSIS — R63.4 WEIGHT LOSS, ABNORMAL: ICD-10-CM

## 2017-06-26 RX ORDER — LORAZEPAM 1 MG/1
TABLET ORAL
Qty: 30 TAB | Refills: 0 | OUTPATIENT
Start: 2017-06-26 | End: 2017-07-25 | Stop reason: SDUPTHER

## 2017-06-26 NOTE — TELEPHONE ENCOUNTER
From: Eugene Hester  To: Moise Boateng MD  Sent: 6/26/2017 11:35 AM EDT  Subject: Medication Renewal Request    Original authorizing provider: MD Raffi Zhou.  Wilner Rizvi would like a refill of the following medications:  LORazepam (ATIVAN) 1 mg tablet Moise Boateng MD]    Preferred pharmacy: [address] [place] [address], [address] [place] [address] Street:

## 2017-06-26 NOTE — TELEPHONE ENCOUNTER
Pt called and stated tht the omeprazole didn't help, she is still having severe stomach pain, she is still losing weight , last ov she was 131.5 now she is 127.5, pls call her to jesusita RM

## 2017-06-27 RX ORDER — SUCRALFATE 1 G/10ML
SUSPENSION ORAL
Qty: 414 ML | Refills: 1 | Status: SHIPPED | OUTPATIENT
Start: 2017-06-27 | End: 2017-10-04 | Stop reason: ALTCHOICE

## 2017-06-27 NOTE — TELEPHONE ENCOUNTER
Spoke with patient, she said she cannot drink the oral contrast because her stomach is hurting. She is asking if RM can rx carafate for her, she said she is doubled over in pain, even if she drinks or eats something small its extremely painful. Her CTs can is Friday at 1pm. She said if you dont think carafate will help can you send in something else that will help until she has her ct scan?  The pharmacy on file is correct

## 2017-06-30 ENCOUNTER — HOSPITAL ENCOUNTER (OUTPATIENT)
Dept: CT IMAGING | Age: 67
Discharge: HOME | End: 2017-06-30
Attending: INTERNAL MEDICINE
Payer: MEDICARE

## 2017-06-30 DIAGNOSIS — R63.4 WEIGHT LOSS, ABNORMAL: ICD-10-CM

## 2017-06-30 DIAGNOSIS — R10.84 GENERALIZED ABDOMINAL PAIN: ICD-10-CM

## 2017-06-30 LAB — CREAT UR-MCNC: 0.5 MG/DL (ref 0.6–1.3)

## 2017-06-30 PROCEDURE — 82565 ASSAY OF CREATININE: CPT

## 2017-06-30 PROCEDURE — 74011636320 HC RX REV CODE- 636/320: Performed by: INTERNAL MEDICINE

## 2017-06-30 PROCEDURE — 74177 CT ABD & PELVIS W/CONTRAST: CPT

## 2017-06-30 RX ADMIN — IOPAMIDOL 100 ML: 612 INJECTION, SOLUTION INTRAVENOUS at 13:00

## 2017-07-03 DIAGNOSIS — R10.84 GENERALIZED POSTPRANDIAL ABDOMINAL PAIN: Primary | ICD-10-CM

## 2017-07-25 RX ORDER — LORAZEPAM 1 MG/1
TABLET ORAL
Qty: 30 TAB | Refills: 0 | OUTPATIENT
Start: 2017-07-25 | End: 2017-10-04 | Stop reason: ALTCHOICE

## 2017-07-25 NOTE — TELEPHONE ENCOUNTER
From: Hay Gordon  To: Reggie Mcmullen MD  Sent: 7/25/2017 4:05 PM EDT  Subject: Medication Renewal Request    Original authorizing provider: MD Aniket Briseno would like a refill of the following medications:  conjugated estrogens (PREMARIN) 0.625 mg tablet Reggie Mcmullen MD]  LORazepam (ATIVAN) 1 mg tablet Reggie Mcmullen MD]    Preferred pharmacy: [address] [place] [address]    Comment:

## 2017-08-08 ENCOUNTER — HOSPITAL ENCOUNTER (OUTPATIENT)
Dept: LAB | Age: 67
Discharge: HOME | End: 2017-08-08
Payer: MEDICARE

## 2017-08-08 ENCOUNTER — HOSPITAL ENCOUNTER (OUTPATIENT)
Dept: VASCULAR SURGERY | Age: 67
Discharge: HOME | End: 2017-08-08
Attending: INTERNAL MEDICINE
Payer: MEDICARE

## 2017-08-08 DIAGNOSIS — R10.13 EPIGASTRIC ABDOMINAL PAIN: ICD-10-CM

## 2017-08-08 LAB
ALBUMIN SERPL BCP-MCNC: 3.4 G/DL (ref 3.4–5)
ALBUMIN/GLOB SERPL: 1.1 {RATIO} (ref 0.8–1.7)
ALP SERPL-CCNC: 46 U/L (ref 45–117)
ALT SERPL-CCNC: 19 U/L (ref 13–56)
ANION GAP BLD CALC-SCNC: 8 MMOL/L (ref 3–18)
AST SERPL W P-5'-P-CCNC: 13 U/L (ref 15–37)
BILIRUB SERPL-MCNC: 0.3 MG/DL (ref 0.2–1)
BUN SERPL-MCNC: 16 MG/DL (ref 7–18)
BUN/CREAT SERPL: 31 (ref 12–20)
CALCIUM SERPL-MCNC: 9.3 MG/DL (ref 8.5–10.1)
CHLORIDE SERPL-SCNC: 106 MMOL/L (ref 100–108)
CO2 SERPL-SCNC: 30 MMOL/L (ref 21–32)
CREAT SERPL-MCNC: 0.52 MG/DL (ref 0.6–1.3)
ERYTHROCYTE [DISTWIDTH] IN BLOOD BY AUTOMATED COUNT: 12.7 % (ref 11.6–14.5)
GLOBULIN SER CALC-MCNC: 3 G/DL (ref 2–4)
GLUCOSE SERPL-MCNC: 81 MG/DL (ref 74–99)
HCT VFR BLD AUTO: 38.1 % (ref 35–45)
HGB BLD-MCNC: 13.1 G/DL (ref 12–16)
MCH RBC QN AUTO: 31.6 PG (ref 24–34)
MCHC RBC AUTO-ENTMCNC: 34.4 G/DL (ref 31–37)
MCV RBC AUTO: 91.8 FL (ref 74–97)
PLATELET # BLD AUTO: 360 K/UL (ref 135–420)
PMV BLD AUTO: 10.4 FL (ref 9.2–11.8)
POTASSIUM SERPL-SCNC: 4 MMOL/L (ref 3.5–5.5)
PROT SERPL-MCNC: 6.4 G/DL (ref 6.4–8.2)
RBC # BLD AUTO: 4.15 M/UL (ref 4.2–5.3)
SODIUM SERPL-SCNC: 144 MMOL/L (ref 136–145)
TSH SERPL DL<=0.05 MIU/L-ACNC: 0.04 UIU/ML (ref 0.36–3.74)
VIT B12 SERPL-MCNC: 143 PG/ML (ref 211–911)
WBC # BLD AUTO: 12 K/UL (ref 4.6–13.2)

## 2017-08-08 PROCEDURE — 93975 VASCULAR STUDY: CPT

## 2017-08-08 NOTE — PROCEDURES
[facility]  *** FINAL REPORT ***    Name: Saumya Bear  MRN: [id number]    Outpatient  : 1950  HIS Order #: [id number]  [address] [facility] Visit #: [id number]  Date: 08 Aug 2017    TYPE OF TEST: Visceral Arterial Duplex    REASON FOR TEST    Aortic PSV:  70.0 cm/s  Diameter AP: 1.4 cm   TV: 1.4 cm    Mesenteric:-                  Prox   Mid   Dist Ratio Stenosis          Aneurysm                  ----- ----- ----- ----- ----------------- ------------  SMA:            179.0 153.0 107.0  2.6 Normal  Celiac:         174.0               2.5 Normal  Hepatic:  Splenic:  BRITTANY:  :    INTERPRETATION/FINDINGS  Duplex images were obtained using 2-D gray scale, color flow, and  spectral Doppler analysis. MESENTERIC:  1. No significant stenosis identified in the superior mesenteric  artery. 2. No significant stenosis identified in the celiac artery. 3. No evidence of aneurysm noted in the abdominal aorta. ADDITIONAL COMMENTS    I have personally reviewed the data relevant to the interpretation of  this  study. TECHNOLOGIST: Roscoe Scruggs, [facility], RVT/  Signed: 2017 09:56 AM    PHYSICIAN: Talisha Romero D.O.   Signed: 2017 02:10 PM

## 2017-08-14 DIAGNOSIS — E53.8 VITAMIN B12 DEFICIENCY: ICD-10-CM

## 2017-08-14 DIAGNOSIS — E03.9 ACQUIRED HYPOTHYROIDISM: ICD-10-CM

## 2017-09-06 RX ORDER — OMEPRAZOLE 40 MG/1
CAPSULE, DELAYED RELEASE ORAL
Qty: 60 CAP | Refills: 1 | Status: SHIPPED | OUTPATIENT
Start: 2017-09-06 | End: 2018-03-25 | Stop reason: SDUPTHER

## 2017-09-06 NOTE — TELEPHONE ENCOUNTER
From: Stuart Maloney  To: Violeta Perdomo MD  Sent: 9/6/2017 8:15 AM EDT  Subject: Medication Renewal Request    Original authorizing provider: MD Rosalino Torresiesha Block Ana would like a refill of the following medications:  omeprazole (PRILOSEC) 40 mg capsule Violeta Perdomo MD]    Preferred pharmacy: [address] [place] [address], [address] [place] [address] Street:

## 2017-09-28 ENCOUNTER — HOSPITAL ENCOUNTER (OUTPATIENT)
Dept: LAB | Age: 67
Discharge: HOME | End: 2017-09-28
Payer: MEDICARE

## 2017-09-28 DIAGNOSIS — E53.8 VITAMIN B12 DEFICIENCY: ICD-10-CM

## 2017-09-28 DIAGNOSIS — E03.9 ACQUIRED HYPOTHYROIDISM: ICD-10-CM

## 2017-09-28 LAB
T4 FREE SERPL-MCNC: 1.7 NG/DL (ref 0.7–1.5)
TSH SERPL DL<=0.05 MIU/L-ACNC: 0.02 UIU/ML (ref 0.36–3.74)
VIT B12 SERPL-MCNC: 336 PG/ML (ref 211–911)

## 2017-09-28 PROCEDURE — 36415 COLL VENOUS BLD VENIPUNCTURE: CPT | Performed by: INTERNAL MEDICINE

## 2017-09-28 PROCEDURE — 84439 ASSAY OF FREE THYROXINE: CPT | Performed by: INTERNAL MEDICINE

## 2017-09-28 PROCEDURE — 84443 ASSAY THYROID STIM HORMONE: CPT | Performed by: INTERNAL MEDICINE

## 2017-09-28 PROCEDURE — 82607 VITAMIN B-12: CPT | Performed by: INTERNAL MEDICINE

## 2017-10-04 ENCOUNTER — OFFICE VISIT (OUTPATIENT)
Dept: INTERNAL MEDICINE CLINIC | Age: 67
End: 2017-10-04

## 2017-10-04 ENCOUNTER — TELEPHONE (OUTPATIENT)
Dept: INTERNAL MEDICINE CLINIC | Age: 67
End: 2017-10-04

## 2017-10-04 VITALS
HEIGHT: 60 IN | OXYGEN SATURATION: 97 % | TEMPERATURE: 98.4 F | BODY MASS INDEX: 25.76 KG/M2 | RESPIRATION RATE: 12 BRPM | SYSTOLIC BLOOD PRESSURE: 132 MMHG | HEART RATE: 90 BPM | WEIGHT: 131.2 LBS | DIASTOLIC BLOOD PRESSURE: 84 MMHG

## 2017-10-04 DIAGNOSIS — M15.9 GENERALIZED OSTEOARTHROSIS, INVOLVING MULTIPLE SITES: ICD-10-CM

## 2017-10-04 DIAGNOSIS — Z12.31 SCREENING MAMMOGRAM, ENCOUNTER FOR: Primary | ICD-10-CM

## 2017-10-04 DIAGNOSIS — E03.8 HYPOTHYROIDISM DUE TO HASHIMOTO'S THYROIDITIS: Primary | ICD-10-CM

## 2017-10-04 DIAGNOSIS — E78.5 HYPERLIPIDEMIA LDL GOAL <130: ICD-10-CM

## 2017-10-04 DIAGNOSIS — E06.3 HYPOTHYROIDISM DUE TO HASHIMOTO'S THYROIDITIS: Primary | ICD-10-CM

## 2017-10-04 DIAGNOSIS — Z23 ENCOUNTER FOR IMMUNIZATION: ICD-10-CM

## 2017-10-04 DIAGNOSIS — E53.8 VITAMIN B12 DEFICIENCY: ICD-10-CM

## 2017-10-04 RX ORDER — LANOLIN ALCOHOL/MO/W.PET/CERES
1000 CREAM (GRAM) TOPICAL DAILY
COMMUNITY
End: 2021-10-01

## 2017-10-04 RX ORDER — LEVOTHYROXINE SODIUM 125 UG/1
125 TABLET ORAL
Qty: 90 TAB | Refills: 3 | Status: SHIPPED | OUTPATIENT
Start: 2017-10-04 | End: 2018-05-08 | Stop reason: SDUPTHER

## 2017-10-04 RX ORDER — DOCUSATE SODIUM 100 MG/1
100 CAPSULE, LIQUID FILLED ORAL
COMMUNITY
End: 2019-04-15 | Stop reason: ALTCHOICE

## 2017-10-04 NOTE — PROGRESS NOTES
Fredo Rodrigues 1950, is a 78 y.o. female, who is seen today for reevaluation of both thyroidism with excess thyroid hormone replacement, overweight, DJD, low vitamin B12 level. She notes that she does get some slight numbness under the metatarsal heads with walking even with comfortable shoes. No numbness in the toes. She is taking B12 regularly. She takes her thyroid hormone regularly and finds that her pulse rate is always a little high, she goes to the gym to workout and is following a diet and has lost 9 pounds in less than 6 months. She hopes to lose several more pounds, perhaps 10 over the next 6 months. She has had no palpitations, just a slightly elevated heart rate. She also is concerned that on a trip sometimes there is little puffiness of her lower legs and ankles and feet that goes down overnight. She also notes that she has noted some bruising on her lower arms especially off and on, particularly if she scratches the skin too hard. Past Medical History:   Diagnosis Date    Allergic rhinitis     Asthma     Diverticulosis     DJD (degenerative joint disease)     DJD (degenerative joint disease)     GERD (gastroesophageal reflux disease)     Headache(784.0)     Hyperlipidemia     Hypothyroidism     Overweight(278.02)     with weight gain    Right knee pain      Current Outpatient Prescriptions   Medication Sig Dispense Refill    docusate sodium (COLACE) 100 mg capsule Take 100 mg by mouth two (2) times a day.  cyanocobalamin (VITAMIN B-12) 1,000 mcg tablet Take 1,000 mcg by mouth daily.  omeprazole (PRILOSEC) 40 mg capsule 1 capsule by mouth twice daily 60 Cap 1    levothyroxine (SYNTHROID) 150 mcg tablet take 1 tablet by mouth once daily 90 Tab 1    conjugated estrogens (PREMARIN) 0.625 mg tablet Take 1 Tab by mouth daily.  (Patient taking differently: Take 0.625 mg by mouth every other day.) 90 Tab 3    aspirin (ASPIRIN) 325 mg tablet Take 325 mg by mouth two (2) times a day.  esomeprazole (NEXIUM) 40 mg capsule Take 1 Cap by mouth daily. 90 Cap 3     Visit Vitals    /84    Pulse 90    Temp 98.4 °F (36.9 °C) (Oral)    Resp 12    Ht 5' (1.524 m)    Wt 131 lb 3.2 oz (59.5 kg)    SpO2 97%    BMI 25.62 kg/m2     Carotids are 2+ without bruits. Lungs are clear to percussion. Good breath sounds with no wheezing or crackles. Heart reveals a regular rhythm with no murmur gallop click or rub. Abdomen is soft and nontender with no hepatosplenomegaly or masses and no bruits. Extremities reveal no clubbing cyanosis or edema. Pulses are 2+. There is a minimal amount of bruising on the lower right arm. No edema. Results for orders placed or performed during the hospital encounter of 09/28/17   T4, FREE   Result Value Ref Range    T4, Free 1.7 (H) 0.7 - 1.5 NG/DL   TSH 3RD GENERATION   Result Value Ref Range    TSH 0.02 (L) 0.36 - 3.74 uIU/mL   VITAMIN B12   Result Value Ref Range    Vitamin B12 336 211 - 911 pg/mL     Assessment: #1. Hypothyroidism now overtreated. I explained to her that over treatment will help her lose weight faster but could cause atrial fibrillation or other problems and she agrees that we can decrease her levothyroxine to 125 mcg daily she should still be able to lose weight. #2. Low vitamin B12 now in the normal range. I have asked her to continue B12 supplements at least for the next 6 months. #3.  Slight swelling of the lower extremities on long trips, this is asymptomatic and I told her she does not need to use support hose for this. #4.  More easily bruising on the upper arms and hands sometimes, related to very thin skin and itching of the skin as I explained to her. No evidence of any type of coagulopathy, these are minimal bruises. #5.  Overweight improving, she hopes to lose another 10 pounds and encouraged her to try to do so.     Follow-up for complete evaluation in 6 months and we will give her a flu shot now.    Sanya Franco. Edgar Sharpe MD FACP    Please note: This document has been produced using voice recognition software. Unrecognized errors in transcription may be present.

## 2017-10-04 NOTE — MR AVS SNAPSHOT
Visit Information Date & Time Provider Department Dept. Phone Encounter #  
 10/4/2017  1:30 PM Claude Camacho MD Internist of [facility] [address] [id number] Your Appointments 3/29/2018  9:55 AM  
LAB with [facility] NURSE VISIT Internist of [facility] ([facility]) Appt Note: lab  
 [address], [address] [place] [address]  
  
   
 [address], [address]  
  
    
 4/5/2018 10:30 AM  
PHYSICAL with Claude Camacho MD  
Internist of [facility]) Appt Note: rpe  
 [address] [facility], [address] [address] [facility] [address]  
  
   
 [address], [address] Upcoming Health Maintenance Date Due DTaP/Tdap/Td series (1 - Tdap) 4/22/2011 MEDICARE YEARLY EXAM 2/2/2016 INFLUENZA AGE 9 TO ADULT 8/1/2017 Pneumococcal 65+ Low/Medium Risk (2 of 2 - PPSV23) 11/11/2017 BREAST CANCER SCRN MAMMOGRAM 12/30/2018 GLAUCOMA SCREENING Q2Y 6/1/2019 COLONOSCOPY 7/27/2027 Allergies as of 10/4/2017  Review Complete On: 10/4/2017 By: Claude Camacho MD  
  
 Severity Noted Reaction Type Reactions Lipitor [Atorvastatin]    Unknown (comments) Myalgia Septra [Sulfamethoprim Ds]    Rash  
 Sulfa (Sulfonamide Antibiotics)  10/19/2012    Hives  
 respiratory distress Current Immunizations  Reviewed on 11/11/2016 Name Date Influenza High Dose Vaccine PF 10/4/2017  1:41 PM  
 Influenza Vaccine (Quad) PF 11/11/2016  2:31 PM, 11/9/2015  3:05 PM  
 Influenza Vaccine PF 10/29/2014  2:59 PM, 10/24/2013 11:18 AM  
 Influenza Vaccine Split 10/19/2012 10:16 AM, 10/11/2011 Influenza Vaccine Whole 9/24/2010 Pneumococcal Conjugate (PCV-13) 11/11/2016  2:31 PM  
 TD Vaccine 4/21/2011 Zoster Vaccine, Live 10/24/2013 11:20 AM  
  
 Not reviewed this visit You Were Diagnosed With   
  
 Codes Comments Hypothyroidism due to Hashimoto's thyroiditis    -  Primary ICD-10-CM: E03.8, E06.3 ICD-9-CM: 244.8, 245.2 Encounter for immunization     ICD-10-CM: J50 ICD-9-CM: V03.89 Generalized osteoarthrosis, involving multiple sites     ICD-10-CM: M15.9 ICD-9-CM: 715.09 Vitamin B12 deficiency     ICD-10-CM: E53.8 ICD-9-CM: 266.2 Vitals BP Pulse Temp Resp Height(growth percentile) Weight(growth percentile) 132/84 90 98.4 °F (36.9 °C) (Oral) 12 5' (1.524 m) 131 lb 3.2 oz (59.5 kg) SpO2 BMI OB Status Smoking Status 97% 25.62 kg/m2 Hysterectomy Never Smoker Vitals History BMI and BSA Data Body Mass Index Body Surface Area  
 25.62 kg/m 2 1.59 m 2 Preferred Pharmacy Pharmacy Name Phone [address] [facility], [address] [place] [address] [phone] Your Updated Medication List  
  
   
This list is accurate as of: 10/4/17  1:51 PM.  Always use your most recent med list.  
  
  
  
  
 aspirin 325 mg tablet Commonly known as:  ASPIRIN Take 325 mg by mouth two (2) times a day. COLACE 100 mg capsule Generic drug:  docusate sodium Take 100 mg by mouth two (2) times a day. conjugated estrogens 0.625 mg tablet Commonly known as:  PREMARIN Take 1 Tab by mouth daily. esomeprazole 40 mg capsule Commonly known as:  NexIUM Take 1 Cap by mouth daily. levothyroxine 150 mcg tablet Commonly known as:  SYNTHROID  
take 1 tablet by mouth once daily  
  
 omeprazole 40 mg capsule Commonly known as:  PRILOSEC  
1 capsule by mouth twice daily VITAMIN B-12 1,000 mcg tablet Generic drug:  cyanocobalamin Take 1,000 mcg by mouth daily. We Performed the Following INFLUENZA VIRUS VACCINE, HIGH DOSE SEASONAL, PRESERVATIVE FREE [72774 CPT(R)] Introducing [facility]! Dear Stevan Virk: Thank you for requesting a [employer] account.   Our records indicate that you have previously registered for a Evolita account but its currently inactive. Please call our Evolita support line at [phone]. Additional Information If you have questions, please visit the Frequently Asked Questions section of the Evolita website at [email or web address] Remember, Evolita is NOT to be used for urgent needs. For medical emergencies, dial 911. Now available from your iPhone and Android! Please provide this summary of care documentation to your next provider. Your primary care clinician is listed as Piter Sterling. Burton Fabry. If you have any questions after today's visit, please call [phone].

## 2018-01-31 NOTE — TELEPHONE ENCOUNTER
From: Clarita Mohs  To: Beulah Hill MD  Sent: 1/31/2018 10:35 AM EST  Subject: Medication Renewal Request    Original authorizing provider: MD Tammi Valencia would like a refill of the following medications:  conjugated estrogens (PREMARIN) 0.625 mg tablet Beulah Hill MD]    Preferred pharmacy: [place] [address]:  I would like a 30 day supply instead of 90. 90 day is too expensive I would appreciate the 30 day supply Thank you

## 2018-03-26 RX ORDER — OMEPRAZOLE 40 MG/1
CAPSULE, DELAYED RELEASE ORAL
Qty: 60 CAP | Refills: 1 | Status: SHIPPED | OUTPATIENT
Start: 2018-03-26 | End: 2018-04-05 | Stop reason: ALTCHOICE

## 2018-03-26 NOTE — TELEPHONE ENCOUNTER
From: Jermaine Chu  To: Arvind Anguiano MD  Sent: 3/25/2018 8:57 AM EDT  Subject: Medication Renewal Request    Original authorizing provider: MD Alcira Jacques.  Amandeep Mike would like a refill of the following medications:  omeprazole (PRILOSEC) 40 mg capsule Arvind Anguiano MD]    Preferred pharmacy: [address] [place] [address], [address] [place] [address] Street:

## 2018-03-28 DIAGNOSIS — E78.5 HYPERLIPIDEMIA LDL GOAL <130: ICD-10-CM

## 2018-03-28 DIAGNOSIS — E53.8 VITAMIN B12 DEFICIENCY: ICD-10-CM

## 2018-03-28 DIAGNOSIS — E03.8 HYPOTHYROIDISM DUE TO HASHIMOTO'S THYROIDITIS: ICD-10-CM

## 2018-03-28 DIAGNOSIS — M15.9 GENERALIZED OSTEOARTHROSIS, INVOLVING MULTIPLE SITES: ICD-10-CM

## 2018-03-28 DIAGNOSIS — E06.3 HYPOTHYROIDISM DUE TO HASHIMOTO'S THYROIDITIS: ICD-10-CM

## 2018-03-29 ENCOUNTER — HOSPITAL ENCOUNTER (OUTPATIENT)
Dept: LAB | Age: 68
Discharge: HOME | End: 2018-03-29
Payer: MEDICARE

## 2018-03-29 ENCOUNTER — LAB ONLY (OUTPATIENT)
Dept: INTERNAL MEDICINE CLINIC | Age: 68
End: 2018-03-29

## 2018-03-29 DIAGNOSIS — E78.5 HYPERLIPIDEMIA LDL GOAL <130: ICD-10-CM

## 2018-03-29 DIAGNOSIS — M15.9 GENERALIZED OSTEOARTHROSIS, INVOLVING MULTIPLE SITES: ICD-10-CM

## 2018-03-29 DIAGNOSIS — E06.3 HYPOTHYROIDISM DUE TO HASHIMOTO'S THYROIDITIS: Primary | ICD-10-CM

## 2018-03-29 DIAGNOSIS — E53.8 VITAMIN B12 DEFICIENCY: ICD-10-CM

## 2018-03-29 DIAGNOSIS — E03.8 HYPOTHYROIDISM DUE TO HASHIMOTO'S THYROIDITIS: Primary | ICD-10-CM

## 2018-03-29 DIAGNOSIS — E06.3 HYPOTHYROIDISM DUE TO HASHIMOTO'S THYROIDITIS: ICD-10-CM

## 2018-03-29 DIAGNOSIS — E03.8 HYPOTHYROIDISM DUE TO HASHIMOTO'S THYROIDITIS: ICD-10-CM

## 2018-03-29 LAB
ALBUMIN SERPL-MCNC: 3.8 G/DL (ref 3.4–5)
ALBUMIN/GLOB SERPL: 1.2 {RATIO} (ref 0.8–1.7)
ALP SERPL-CCNC: 67 U/L (ref 45–117)
ALT SERPL-CCNC: 14 U/L (ref 13–56)
ANION GAP SERPL CALC-SCNC: 10 MMOL/L (ref 3–18)
AST SERPL-CCNC: 13 U/L (ref 15–37)
BASOPHILS # BLD: 0 K/UL (ref 0–0.06)
BASOPHILS NFR BLD: 0 % (ref 0–2)
BILIRUB SERPL-MCNC: 0.4 MG/DL (ref 0.2–1)
BUN SERPL-MCNC: 18 MG/DL (ref 7–18)
BUN/CREAT SERPL: 27 (ref 12–20)
CALCIUM SERPL-MCNC: 9.6 MG/DL (ref 8.5–10.1)
CHLORIDE SERPL-SCNC: 106 MMOL/L (ref 100–108)
CHOLEST SERPL-MCNC: 262 MG/DL
CO2 SERPL-SCNC: 24 MMOL/L (ref 21–32)
CREAT SERPL-MCNC: 0.66 MG/DL (ref 0.6–1.3)
DIFFERENTIAL METHOD BLD: NORMAL
EOSINOPHIL # BLD: 0.1 K/UL (ref 0–0.4)
EOSINOPHIL NFR BLD: 2 % (ref 0–5)
ERYTHROCYTE [DISTWIDTH] IN BLOOD BY AUTOMATED COUNT: 13.1 % (ref 11.6–14.5)
GLOBULIN SER CALC-MCNC: 3.1 G/DL (ref 2–4)
GLUCOSE SERPL-MCNC: 99 MG/DL (ref 74–99)
HCT VFR BLD AUTO: 41.1 % (ref 35–45)
HDLC SERPL-MCNC: 80 MG/DL (ref 40–60)
HDLC SERPL: 3.3 {RATIO} (ref 0–5)
HGB BLD-MCNC: 13.8 G/DL (ref 12–16)
LDLC SERPL CALC-MCNC: 141.8 MG/DL (ref 0–100)
LIPID PROFILE,FLP: ABNORMAL
LYMPHOCYTES # BLD: 1.9 K/UL (ref 0.9–3.6)
LYMPHOCYTES NFR BLD: 28 % (ref 21–52)
MCH RBC QN AUTO: 31.4 PG (ref 24–34)
MCHC RBC AUTO-ENTMCNC: 33.6 G/DL (ref 31–37)
MCV RBC AUTO: 93.6 FL (ref 74–97)
MONOCYTES # BLD: 0.6 K/UL (ref 0.05–1.2)
MONOCYTES NFR BLD: 8 % (ref 3–10)
NEUTS SEG # BLD: 4.2 K/UL (ref 1.8–8)
NEUTS SEG NFR BLD: 62 % (ref 40–73)
PLATELET # BLD AUTO: 331 K/UL (ref 135–420)
PMV BLD AUTO: 10.5 FL (ref 9.2–11.8)
POTASSIUM SERPL-SCNC: 4.6 MMOL/L (ref 3.5–5.5)
PROT SERPL-MCNC: 6.9 G/DL (ref 6.4–8.2)
RBC # BLD AUTO: 4.39 M/UL (ref 4.2–5.3)
SODIUM SERPL-SCNC: 140 MMOL/L (ref 136–145)
T4 FREE SERPL-MCNC: 1.2 NG/DL (ref 0.7–1.5)
TRIGL SERPL-MCNC: 201 MG/DL (ref ?–150)
TSH SERPL DL<=0.05 MIU/L-ACNC: 0.9 UIU/ML (ref 0.36–3.74)
VIT B12 SERPL-MCNC: 481 PG/ML (ref 211–911)
VLDLC SERPL CALC-MCNC: 40.2 MG/DL
WBC # BLD AUTO: 6.8 K/UL (ref 4.6–13.2)

## 2018-03-29 PROCEDURE — 36415 COLL VENOUS BLD VENIPUNCTURE: CPT | Performed by: INTERNAL MEDICINE

## 2018-03-29 PROCEDURE — 82607 VITAMIN B-12: CPT | Performed by: INTERNAL MEDICINE

## 2018-03-29 PROCEDURE — 85025 COMPLETE CBC W/AUTO DIFF WBC: CPT | Performed by: INTERNAL MEDICINE

## 2018-03-29 PROCEDURE — 80053 COMPREHEN METABOLIC PANEL: CPT | Performed by: INTERNAL MEDICINE

## 2018-03-29 PROCEDURE — 84443 ASSAY THYROID STIM HORMONE: CPT | Performed by: INTERNAL MEDICINE

## 2018-03-29 PROCEDURE — 80061 LIPID PANEL: CPT | Performed by: INTERNAL MEDICINE

## 2018-03-29 PROCEDURE — 84439 ASSAY OF FREE THYROXINE: CPT | Performed by: INTERNAL MEDICINE

## 2018-04-05 ENCOUNTER — OFFICE VISIT (OUTPATIENT)
Dept: INTERNAL MEDICINE CLINIC | Age: 68
End: 2018-04-05

## 2018-04-05 VITALS
WEIGHT: 156 LBS | RESPIRATION RATE: 12 BRPM | HEART RATE: 72 BPM | OXYGEN SATURATION: 98 % | TEMPERATURE: 98.4 F | SYSTOLIC BLOOD PRESSURE: 128 MMHG | BODY MASS INDEX: 30.63 KG/M2 | HEIGHT: 60 IN | DIASTOLIC BLOOD PRESSURE: 72 MMHG

## 2018-04-05 DIAGNOSIS — M15.9 PRIMARY OSTEOARTHRITIS INVOLVING MULTIPLE JOINTS: Primary | ICD-10-CM

## 2018-04-05 DIAGNOSIS — Z23 ENCOUNTER FOR IMMUNIZATION: ICD-10-CM

## 2018-04-05 DIAGNOSIS — K21.9 GASTROESOPHAGEAL REFLUX DISEASE WITHOUT ESOPHAGITIS: ICD-10-CM

## 2018-04-05 DIAGNOSIS — E03.8 HYPOTHYROIDISM DUE TO HASHIMOTO'S THYROIDITIS: ICD-10-CM

## 2018-04-05 DIAGNOSIS — E53.8 VITAMIN B12 DEFICIENCY: ICD-10-CM

## 2018-04-05 DIAGNOSIS — E78.5 HYPERLIPIDEMIA LDL GOAL <130: ICD-10-CM

## 2018-04-05 DIAGNOSIS — E06.3 HYPOTHYROIDISM DUE TO HASHIMOTO'S THYROIDITIS: ICD-10-CM

## 2018-04-05 RX ORDER — ESTRADIOL 1 MG/1
1 TABLET ORAL DAILY
Qty: 90 TAB | Refills: 3 | Status: SHIPPED | OUTPATIENT
Start: 2018-04-05 | End: 2019-04-15 | Stop reason: ALTCHOICE

## 2018-04-05 RX ORDER — MELOXICAM 15 MG/1
15 TABLET ORAL DAILY
Qty: 90 TAB | Refills: 3 | Status: SHIPPED | OUTPATIENT
Start: 2018-04-05 | End: 2019-01-23 | Stop reason: ALTCHOICE

## 2018-04-05 NOTE — PROGRESS NOTES
Destiney Weston 1950, is a 80 y.o. female, who is seen today for reevaluation of DJD obesity GERD hypothyroidism hyperlipidemia. She lost a lot of weight when I saw her last year and she was hoping to lose a few more pounds but now in the last 6 months she has gained 25 pounds. She blames this on not being able to walk as much because of arthritis in her right knee and she does not think she is eating more. She had good results with knee replacement on the left that was done a year ago when she thinks probably she will need knee replacement this coming year, she wants to put that off until perhaps January. She would like to restart meloxicam which has helped her in the past with arthritis in the knees and in the hands. Her third right finger is the main focus of arthritis besides her right knee. Milder pain in other joints. She wants to know if she should have another bone density checked. She is having no reflux as she continues Nexium. She is taking all of her medicine regularly including B12 which was found to be low about a year ago. She has had ongoing hot flashes and when she is tried stopping Premarin she has had rather debilitating hot flashes day and night but she does find it expensive at $260 every 3 months.     Past Medical History:   Diagnosis Date    Allergic rhinitis     Asthma     Diverticulosis     DJD (degenerative joint disease)     DJD (degenerative joint disease)     GERD (gastroesophageal reflux disease)     Headache(784.0)     Hyperlipidemia     Hypothyroidism     Overweight(278.02)     with weight gain    Right knee pain      Past Surgical History:   Procedure Laterality Date    BREAST SURGERY PROCEDURE UNLISTED      reduction mammoplasty    ENDOSCOPY, COLON, DIAGNOSTIC      HX APPENDECTOMY      HX CHOLECYSTECTOMY      HX HYSTERECTOMY      HX KNEE ARTHROSCOPY      HX ORTHOPAEDIC      left knee    HX TONSILLECTOMY      HX TUBAL LIGATION       Current Outpatient Prescriptions   Medication Sig Dispense Refill    conjugated estrogens (PREMARIN) 0.625 mg tablet Take 1 Tab by mouth daily. 90 Tab 3    docusate sodium (COLACE) 100 mg capsule Take 100 mg by mouth two (2) times a day.  cyanocobalamin (VITAMIN B-12) 1,000 mcg tablet Take 1,000 mcg by mouth daily.  levothyroxine (SYNTHROID) 125 mcg tablet Take 1 Tab by mouth Daily (before breakfast). 90 Tab 3    esomeprazole (NEXIUM) 40 mg capsule Take 1 Cap by mouth daily. 90 Cap 3     Allergies   Allergen Reactions    Lipitor [Atorvastatin] Unknown (comments)     Myalgia      Septra [Sulfamethoprim Ds] Rash    Sulfa (Sulfonamide Antibiotics) Hives     respiratory distress     Social History     Social History    Marital status:      Spouse name: N/A    Number of children: N/A    Years of education: N/A     Social History Main Topics    Smoking status: Never Smoker    Smokeless tobacco: Never Used    Alcohol use Yes      Comment: rarely    Drug use: No    Sexual activity: Not Asked     Other Topics Concern    None     Social History Narrative     Visit Vitals    /72    Pulse 72    Temp 98.4 °F (36.9 °C) (Oral)    Resp 12    Ht 5' (1.524 m)    Wt 156 lb (70.8 kg)    SpO2 98%    BMI 30.47 kg/m2     Ear canals and tympanic membranes appear normal with good light reflex bilaterally. Oral cavity reveals no lesions. Neck reveals no adenopathy or thyromegaly. Carotids are 2+ without bruits. Lungs are clear to percussion. Good breath sounds with no wheezing or crackles. Heart reveals a regular rhythm with normal S1 and S2 no murmur gallop click or rub. Apical impulse is not palpable. Abdomen is soft and nontender with no hepatosplenomegaly or masses and no bruits. Extremities reveal no clubbing cyanosis or edema. Pulses are 2+. Slight crepitation with range of motion of the right knee. No effusion or tenderness.   There is a scar on the left knee from knee replacement done a year ago. Breasts reveal no masses no skin or nipple abnormalities and no axillary adenopathy. Results for orders placed or performed during the hospital encounter of 03/29/18   T4, FREE   Result Value Ref Range    T4, Free 1.2 0.7 - 1.5 NG/DL   TSH 3RD GENERATION   Result Value Ref Range    TSH 0.90 0.36 - 3.74 uIU/mL   VITAMIN B12   Result Value Ref Range    Vitamin B12 481 211 - 911 pg/mL   CBC WITH AUTOMATED DIFF   Result Value Ref Range    WBC 6.8 4.6 - 13.2 K/uL    RBC 4.39 4.20 - 5.30 M/uL    HGB 13.8 12.0 - 16.0 g/dL    HCT 41.1 35.0 - 45.0 %    MCV 93.6 74.0 - 97.0 FL    MCH 31.4 24.0 - 34.0 PG    MCHC 33.6 31.0 - 37.0 g/dL    RDW 13.1 11.6 - 14.5 %    PLATELET 871 269 - 071 K/uL    MPV 10.5 9.2 - 11.8 FL    NEUTROPHILS 62 40 - 73 %    LYMPHOCYTES 28 21 - 52 %    MONOCYTES 8 3 - 10 %    EOSINOPHILS 2 0 - 5 %    BASOPHILS 0 0 - 2 %    ABS. NEUTROPHILS 4.2 1.8 - 8.0 K/UL    ABS. LYMPHOCYTES 1.9 0.9 - 3.6 K/UL    ABS. MONOCYTES 0.6 0.05 - 1.2 K/UL    ABS. EOSINOPHILS 0.1 0.0 - 0.4 K/UL    ABS. BASOPHILS 0.0 0.0 - 0.06 K/UL    DF AUTOMATED     METABOLIC PANEL, COMPREHENSIVE   Result Value Ref Range    Sodium 140 136 - 145 mmol/L    Potassium 4.6 3.5 - 5.5 mmol/L    Chloride 106 100 - 108 mmol/L    CO2 24 21 - 32 mmol/L    Anion gap 10 3.0 - 18 mmol/L    Glucose 99 74 - 99 mg/dL    BUN 18 7.0 - 18 MG/DL    Creatinine 0.66 0.6 - 1.3 MG/DL    BUN/Creatinine ratio 27 (H) 12 - 20      GFR est AA >60 >60 ml/min/1.73m2    GFR est non-AA >60 >60 ml/min/1.73m2    Calcium 9.6 8.5 - 10.1 MG/DL    Bilirubin, total 0.4 0.2 - 1.0 MG/DL    ALT (SGPT) 14 13 - 56 U/L    AST (SGOT) 13 (L) 15 - 37 U/L    Alk.  phosphatase 67 45 - 117 U/L    Protein, total 6.9 6.4 - 8.2 g/dL    Albumin 3.8 3.4 - 5.0 g/dL    Globulin 3.1 2.0 - 4.0 g/dL    A-G Ratio 1.2 0.8 - 1.7     LIPID PANEL   Result Value Ref Range    LIPID PROFILE          Cholesterol, total 262 (H) <200 MG/DL    Triglyceride 201 (H) <150 MG/DL    HDL Cholesterol 80 (H) 40 - 60 MG/DL    LDL, calculated 141.8 (H) 0 - 100 MG/DL    VLDL, calculated 40.2 MG/DL    CHOL/HDL Ratio 3.3 0 - 5.0       Assessment: #1. DJD particularly involving her right third finger and her right knee, she will restart meloxicam 15 mg daily and she will be seeing Dr. Heather Harris for a check on her knee in the next couple of weeks. #2.  History of obesity with good weight loss and now she has regained 25 pounds, I talked to her some about diet but she insists that it is exercise related. I have asked her to do the walking that she can to and limit carbohydrates and sweets. #3. Hyperlipidemia, intolerant to statins, but her 10 year risk is only about 6.5%, which would be perhaps 5% if she added statin therapy. I have told her that even if she did tolerate statin therapy I would not necessarily recommend it with this minimal potential benefit. #4.  Ongoing hot flashes anytime she tried stopping Premarin, she will try switching to estradiol 1 mg daily when she runs out of her current supply of Premarin in about a month. This should be much less expensive for her. She understands that the benefit should be about the same and perhaps we can try decreasing to 0.5 mg in the future. I discussed with her the risks and benefits of hormone therapy given her age group and symptoms and at this point benefits outweigh risks for her. #5. Hypothyroidism doing well with normal TSH and free T4. She will continue levothyroxine 125 mcg daily. #6.  Vitamin B12 deficiency much improved. She will take vitamin B12 1000 mcg daily for another year and then we will try stopping it and check B12 on an annual basis thereafter. She agrees with this plan. #7.  GERD asymptomatic. She will continue Nexium 40 mg daily. Follow-up in 1 year for complete evaluation or sooner if needed.   She will be due for mammography in December and she will call me when that is due, she will also request a bone density to be done at the same time.  She had normal bone density 5-1/2 years ago and with her body build and continued use of estrogen replacement therapy her bone density likely is still normal.  I did discuss that with her today. She will receive PPSV 23 now and is up-to-date on all of her other vaccinations. She is a good candidate for the new shingles vaccination having received the previous one in 2011. We will discuss that in the future and did not discuss it today. Americo Grimes MD FACP    Please note: This document has been produced using voice recognition software. Unrecognized errors in transcription may be present.

## 2018-04-05 NOTE — MR AVS SNAPSHOT
[address] [facility] 
 
 
 [address], [address] [facility] 
[phone] Patient: Davey Gaona MRN: [id number] ZDE:[id number] Visit Information Date & Time Provider Department Dept. Phone Encounter #  
 4/5/2018 10:30 AM Jamaica Anguiano MD [facility] [address] [place] [address]  Your Appointments 4/8/2019 10:05 AM  
LAB with [facility] NURSE VISIT [facility] [address] [place] [address] ([address] [facility]) Appt Note: labs [address], [address]  
  
   
 [address], [address] 4/15/2019 10:30 AM  
PHYSICAL with Jamaica Anguiano MD  
[facility] [address] [place] [address] [address] [facility]) Appt Note: rpe 1yr rm  
 [address] [facility], [facility] S[address]  
  
   
 [address], [address] Upcoming Health Maintenance Date Due DTaP/Tdap/Td series (1 - Tdap) 4/22/2011 MEDICARE YEARLY EXAM 3/14/2018 GLAUCOMA SCREENING Q2Y 6/1/2019 BREAST CANCER SCRN MAMMOGRAM 12/6/2019 COLONOSCOPY 7/27/2027 Allergies as of 4/5/2018  Review Complete On: 4/5/2018 By: Jamaica Anguiano MD  
  
 Severity Noted Reaction Type Reactions Lipitor [Atorvastatin]    Unknown (comments) Myalgia Septra [Sulfamethoprim Ds]    Rash  
 Sulfa (Sulfonamide Antibiotics)  10/19/2012    Hives  
 respiratory distress Current Immunizations  Reviewed on 4/5/2018 Name Date Influenza High Dose Vaccine PF 10/4/2017  1:41 PM  
 Influenza Vaccine (Quad) PF 11/11/2016  2:31 PM, 11/9/2015  3:05 PM  
 Influenza Vaccine PF 10/29/2014  2:59 PM, 10/24/2013 11:18 AM  
 Influenza Vaccine Split 10/19/2012 10:16 AM, 10/11/2011 Influenza Vaccine Whole 9/24/2010 Pneumococcal Conjugate (PCV-13) 11/11/2016  2:31 PM  
 Pneumococcal Polysaccharide (PPSV-23) 4/5/2018 11:12 AM  
 TD Vaccine 4/21/2011 Zoster Vaccine, Live 10/24/2013 11:20 AM  
  
 Reviewed by Melanie Escoto MD on 4/5/2018 at 10:47 AM  
You Were Diagnosed With   
  
 Codes Comments Primary osteoarthritis involving multiple joints    -  Primary ICD-10-CM: M15.0 ICD-9-CM: 715.09 Encounter for immunization     ICD-10-CM: N78 ICD-9-CM: V03.89 Vitamin B12 deficiency     ICD-10-CM: E53.8 ICD-9-CM: 266.2 Hypothyroidism due to Hashimoto's thyroiditis     ICD-10-CM: E03.8, E06.3 ICD-9-CM: 244.8, 245.2 Hyperlipidemia LDL goal <130     ICD-10-CM: E78.5 ICD-9-CM: 272.4 Gastroesophageal reflux disease without esophagitis     ICD-10-CM: K21.9 ICD-9-CM: 530.81 Vitals BP Pulse Temp Resp Height(growth percentile) Weight(growth percentile) 128/72 72 98.4 °F (36.9 °C) (Oral) 12 5' (1.524 m) 156 lb (70.8 kg) SpO2 BMI OB Status Smoking Status 98% 30.47 kg/m2 Hysterectomy Never Smoker Vitals History BMI and BSA Data Body Mass Index Body Surface Area  
 30.47 kg/m 2 1.73 m 2 Preferred Pharmacy Pharmacy Name Phone [facility], [address] [place] [address] [phone] Your Updated Medication List  
  
   
This list is accurate as of 4/5/18 11:16 AM.  Always use your most recent med list.  
  
  
  
  
 COLACE 100 mg capsule Generic drug:  docusate sodium Take 100 mg by mouth two (2) times a day. conjugated estrogens 0.625 mg tablet Commonly known as:  PREMARIN Take 1 Tab by mouth daily. esomeprazole 40 mg capsule Commonly known as:  NexIUM Take 1 Cap by mouth daily. estradiol 1 mg tablet Commonly known as:  ESTRACE Take 1 Tab by mouth daily. levothyroxine 125 mcg tablet Commonly known as:  SYNTHROID Take 1 Tab by mouth Daily (before breakfast). meloxicam 15 mg tablet Commonly known as:  MOBIC Take 1 Tab by mouth daily. VITAMIN B-12 1,000 mcg tablet Generic drug:  cyanocobalamin Take 1,000 mcg by mouth daily. Prescriptions Sent to Pharmacy Refills  
 meloxicam (MOBIC) 15 mg tablet 3 Sig: Take 1 Tab by mouth daily. Class: Normal  
 Pharmacy: [address] [place] [address] Ph #: [phone] Route: Oral  
 estradiol (ESTRACE) 1 mg tablet 3 Sig: Take 1 Tab by mouth daily. Class: Normal  
 Pharmacy: [address] [place] [address] Ph #: [phone] Route: Oral  
  
We Performed the Following PNEUMOCOCCAL POLYSACCHARIDE VACCINE, 23-VALENT, ADULT OR IMMUNOSUPPRESSED PT DOSE, [31101 CPT(R)] Introducing [facility] SERVICES! Dear Sebastián Monreal: Thank you for requesting a [facility] account. Our records indicate that you already have an active [facility] account. You can access your account anytime at [email or web address] Did you know that you can access your hospital and ER discharge instructions at any time in [facility]? You can also review all of your test results from your hospital stay or ER visit. Additional Information If you have questions, please visit the Frequently Asked Questions section of the [facility] website at [email or web address] Remember, [facility] is NOT to be used for urgent needs. For medical emergencies, dial 911. Now available from your iPhone and Android! Please provide this summary of care documentation to your next provider. Your primary care clinician is listed as Mars Griffin. Gus Crump. If you have any questions after today's visit, please call [phone].

## 2018-04-20 RX ORDER — CIPROFLOXACIN 500 MG/1
500 TABLET ORAL 2 TIMES DAILY
Qty: 20 TAB | Refills: 0 | Status: SHIPPED | OUTPATIENT
Start: 2018-04-20 | End: 2018-11-27 | Stop reason: ALTCHOICE

## 2018-04-20 RX ORDER — METRONIDAZOLE 500 MG/1
500 TABLET ORAL 3 TIMES DAILY
Qty: 30 TAB | Refills: 0 | Status: SHIPPED | OUTPATIENT
Start: 2018-04-20 | End: 2019-01-23 | Stop reason: ALTCHOICE

## 2018-04-20 NOTE — PROGRESS NOTES
Abx prescribed for presumed diverticulitis as was previously prescribed to her last year. Will have schedule a f/u apt with her PCP next wk.     Dr. Bouchra Borja  [facility] [address], [address] [place] [address] Str.  Phone: [phone]  Fax: [phone]

## 2018-05-09 RX ORDER — LEVOTHYROXINE SODIUM 125 UG/1
125 TABLET ORAL
Qty: 90 TAB | Refills: 3 | Status: SHIPPED | OUTPATIENT
Start: 2018-05-09 | End: 2018-11-07 | Stop reason: SDUPTHER

## 2018-05-09 NOTE — TELEPHONE ENCOUNTER
From: Muriel Butler  To: Kasia Meek MD  Sent: 5/8/2018 11:25 PM EDT  Subject: Medication Renewal Request    Original authorizing provider: MD Nini Richard.  Danieldamon Emguillermina would like a refill of the following medications:  levothyroxine (SYNTHROID) 125 mcg tablet Kasia Meek MD]    Preferred pharmacy: [address] [place] [address], [address] [place] [address] Street:

## 2018-07-24 RX ORDER — ESOMEPRAZOLE MAGNESIUM 40 MG/1
40 CAPSULE, DELAYED RELEASE ORAL DAILY
Qty: 90 CAP | Refills: 3 | Status: SHIPPED | OUTPATIENT
Start: 2018-07-24 | End: 2018-07-31 | Stop reason: ALTCHOICE

## 2018-07-24 NOTE — TELEPHONE ENCOUNTER
From: Val Kent  To: Santos Hurst MD  Sent: 7/24/2018 2:33 PM EDT  Subject: Medication Renewal Request    Original authorizing provider: MD Meli Peñaloza.  Colten Hernandez would like a refill of the following medications:  esomeprazole (NEXIUM) 40 mg capsule Santos Hurst MD]    Preferred pharmacy: [address]    Comment:  need refill for Omepprazole 40 mg take one twice a day

## 2018-07-30 RX ORDER — OMEPRAZOLE 40 MG/1
CAPSULE, DELAYED RELEASE ORAL
Qty: 60 CAP | Refills: 1 | Status: SHIPPED | OUTPATIENT
Start: 2018-07-30 | End: 2018-07-31 | Stop reason: SDUPTHER

## 2018-07-31 RX ORDER — OMEPRAZOLE 40 MG/1
40 CAPSULE, DELAYED RELEASE ORAL 2 TIMES DAILY
Qty: 180 CAP | Refills: 3 | Status: SHIPPED | OUTPATIENT
Start: 2018-07-31 | End: 2018-11-26 | Stop reason: SDUPTHER

## 2018-07-31 NOTE — TELEPHONE ENCOUNTER
Insurance requires a minimum fill for 90 days. If appropriate, please sign pended medication order. If not, please notify me. Last Visit: 04/05/2018 with MD Ze Tavera    Next Appointment: 04/15/2019 with MD Ze Tavera     Requested Prescriptions     Pending Prescriptions Disp Refills    omeprazole (PRILOSEC) 40 mg capsule 180 Cap 0     Sig: Take 1 Cap by mouth two (2) times a day.

## 2018-11-07 RX ORDER — LEVOTHYROXINE SODIUM 125 UG/1
125 TABLET ORAL
Qty: 90 TAB | Refills: 3 | Status: SHIPPED | OUTPATIENT
Start: 2018-11-07 | End: 2019-11-10 | Stop reason: SDUPTHER

## 2018-11-26 RX ORDER — OMEPRAZOLE 40 MG/1
40 CAPSULE, DELAYED RELEASE ORAL 2 TIMES DAILY
Qty: 180 CAP | Refills: 3 | Status: SHIPPED | OUTPATIENT
Start: 2018-11-26 | End: 2020-07-27 | Stop reason: CLARIF

## 2018-11-27 ENCOUNTER — HOSPITAL ENCOUNTER (OUTPATIENT)
Dept: GENERAL RADIOLOGY | Age: 68
Discharge: HOME | End: 2018-11-27
Payer: MEDICARE

## 2018-11-27 ENCOUNTER — OFFICE VISIT (OUTPATIENT)
Dept: INTERNAL MEDICINE CLINIC | Age: 68
End: 2018-11-27

## 2018-11-27 VITALS
HEIGHT: 60 IN | SYSTOLIC BLOOD PRESSURE: 126 MMHG | RESPIRATION RATE: 14 BRPM | HEART RATE: 91 BPM | BODY MASS INDEX: 30.47 KG/M2 | OXYGEN SATURATION: 96 % | TEMPERATURE: 98.1 F | DIASTOLIC BLOOD PRESSURE: 70 MMHG

## 2018-11-27 DIAGNOSIS — M79.671 RIGHT FOOT PAIN: Primary | ICD-10-CM

## 2018-11-27 DIAGNOSIS — M79.671 RIGHT FOOT PAIN: ICD-10-CM

## 2018-11-27 PROCEDURE — 73630 X-RAY EXAM OF FOOT: CPT

## 2018-11-27 NOTE — PROGRESS NOTES
ROOM # 12 Eddie Gutiérrez presents today for Chief Complaint Patient presents with  Foot Pain Patient states pain has gotten progresivley worse. Eddie Gutiérrez preferred language for health care discussion is english/other. Is someone accompanying this pt? Yes,  Is the patient using any DME equipment during OV? Yes, wheelchair Depression Screening: PHQ over the last two weeks 11/27/2018 4/5/2018 10/4/2017 6/16/2017 4/28/2017 4/5/2017 11/11/2016 Little interest or pleasure in doing things Not at all Not at all Not at all Not at all Not at all Several days Not at all Feeling down, depressed, irritable, or hopeless Not at all Not at all Not at all Not at all Not at all Several days Not at all Total Score PHQ 2 0 0 0 0 0 2 0 Learning Assessment: 
Learning Assessment 4/25/2014 4/19/2013 PRIMARY LEARNER Patient Patient HIGHEST LEVEL OF EDUCATION - PRIMARY LEARNER  2 YEARS OF COLLEGE -  
BARRIERS PRIMARY LEARNER NONE -  
CO-LEARNER CAREGIVER No - PRIMARY LANGUAGE ENGLISH ENGLISH  
LEARNER PREFERENCE PRIMARY DEMONSTRATION DEMONSTRATION  
ANSWERED BY patient GIL Hickey LPN  
RELATIONSHIP SELF SELF Abuse Screening: 
Abuse Screening Questionnaire 4/29/2015 Do you ever feel afraid of your partner? Natalya Mandril Are you in a relationship with someone who physically or mentally threatens you? Natalya Mandril Is it safe for you to go home? Len Ordonez Fall Risk Fall Risk Assessment, last 12 mths 11/27/2018 4/5/2018 10/4/2017 6/16/2017 4/28/2017 4/5/2017 11/11/2016 Able to walk? Yes Yes Yes Yes Yes Yes Yes Fall in past 12 months? Yes No No No No No No  
Fall with injury? No - - - - - - Number of falls in past 12 months 1 - - - - - - Fall Risk Score 1 - - - - - - Visit Vitals /70 (BP 1 Location: Left arm, BP Patient Position: Sitting) Pulse 91 Temp 98.1 °F (36.7 °C) (Oral) Resp 14 Ht 5' (1.524 m) SpO2 96% BMI 30.47 kg/m² Health Maintenance reviewed and discussed per provider. Yes Giorgi Castillo is due for Health Maintenance Due Topic Date Due  Shingrix Vaccine Age 50> (1 of 2) 02/02/2001  DTaP/Tdap/Td series (1 - Tdap) 04/22/2011  MEDICARE YEARLY EXAM  03/14/2018 Please order/place referral if appropriate. Advance Directive: 1. Do you have an advance directive in place? Patient Reply: NO 
 
2. If not, would you like material regarding how to put one in place? Patient Reply: NO 
 
Coordination of Care: 1. Have you been to the ER, urgent care clinic since your last visit? Hospitalized since your last visit? No 
 
2. Have you seen or consulted any other health care providers outside of the [address] [place] [address] since your last visit? Include any pap smears or colon screening.  No

## 2018-11-27 NOTE — PROGRESS NOTES
Spoke with the patient regarding xray results and suggested STEW PRITCHARD, wait for ortho appointment, if pain not controlled with nsaids and tylenol and need further stabilization then present to urgent care where they can provide boot but right now instructed to stabilize the ankle.

## 2018-11-28 ENCOUNTER — TELEPHONE (OUTPATIENT)
Dept: INTERNAL MEDICINE CLINIC | Age: 68
End: 2018-11-28

## 2018-11-28 NOTE — TELEPHONE ENCOUNTER
Pt was referred to see Ortho yesterday, they called and cant get her in till end of January    Pt is asking to see someone today    Please advise

## 2018-11-29 ENCOUNTER — OFFICE VISIT (OUTPATIENT)
Dept: ORTHOPEDIC SURGERY | Age: 68
End: 2018-11-29

## 2018-11-29 VITALS
HEART RATE: 87 BPM | WEIGHT: 156 LBS | RESPIRATION RATE: 16 BRPM | HEIGHT: 60 IN | OXYGEN SATURATION: 98 % | DIASTOLIC BLOOD PRESSURE: 76 MMHG | TEMPERATURE: 96.8 F | BODY MASS INDEX: 30.63 KG/M2 | SYSTOLIC BLOOD PRESSURE: 120 MMHG

## 2018-11-29 DIAGNOSIS — M79.671 RIGHT FOOT PAIN: Primary | ICD-10-CM

## 2018-11-29 NOTE — PROGRESS NOTES
HISTORY OF PRESENT ILLNESS:  Narciso Phan is a pleasant, 81-year-old, obese,  female who presented earlier in the week to her PCP following two events where she rolled her right ankle when she was getting out of a very tall truck owned by her son when she stepped down and her ankle rolled, she believes, inward. The day later, she was walking and unfortunately lost her balance and rolled her ankle again. She is uncertain, though, whether it rolled inward or outward. Since the second event, she has had increased pain and swelling about the entire ankle. She has no history of trauma to the ankle. The patient has been using a wheelchair primarily. She does have a walker at home that she has tried to use, but unfortunately, she has to hop while using it. She feels unsafe with the walker nonweightbearing of the right lower extremity. Her pain at rest associated with the right ankle is 3-4/10 mainly to the outer portion and with activity, to include partial weightbearing, her pain increases to upwards of an 8-9/10. REVIEW OF SYSTEMS:  No chest pain. No shortness of breath. No fevers, chills, or night sweats. No rash and no itching. No nausea and no vomiting. Pain is per her HPI. PHYSICAL EXAM:  She is a healthy-appearing, 81-year-old, obese,  female, atraumatic, normocephalic, alert and oriented times three, sitting in the exam room in a wheelchair comfortably. Examination to the right lower extremity reveals moderate swelling noted circumferentially about the right ankle with pronounced tenderness associated posterior to the lateral malleolus. She has guarded passive range of motion at barely 2° on plantarflexion and dorsiflexion is 0° with guarding. The patients distal sensation is intact fully to the right lower extremity. Capillary refill is brisk and less than 2 seconds to the right lower extremity. There is a negative anterior drawer sign.      RADIOGRAPHS:  X-rays, both obtained from [facility] on November 27, 2018, as well as an AP image that was done today while in our office, reveal no acute fracture deformities or osseous lesions. There is soft tissue swelling lateral about the ankle. IMPRESSION:      1. Grade II ankle sprain on the right. 2. Decreased range of motion of the right foot secondary to above. 3. Right ankle pain. PLAN:   I am going to put her in a short boot. I recommend Tylenol and/or Motrin for symptom management. She is going to follow with us in a couple of weeks. She was given crutches today and provided crutch instruction. She is going to use ice and/or heat per protocol. Today, all her questions were answered to her satisfaction. Copies of her x-rays are reviewed and provided.

## 2018-12-12 ENCOUNTER — OFFICE VISIT (OUTPATIENT)
Dept: ORTHOPEDIC SURGERY | Facility: CLINIC | Age: 68
End: 2018-12-12

## 2018-12-12 VITALS
HEIGHT: 60 IN | SYSTOLIC BLOOD PRESSURE: 137 MMHG | BODY MASS INDEX: 30.47 KG/M2 | DIASTOLIC BLOOD PRESSURE: 79 MMHG | TEMPERATURE: 96.2 F | RESPIRATION RATE: 16 BRPM | HEART RATE: 85 BPM | OXYGEN SATURATION: 97 %

## 2018-12-12 DIAGNOSIS — S99.911D INJURY OF RIGHT ANKLE, SUBSEQUENT ENCOUNTER: Primary | ICD-10-CM

## 2018-12-12 DIAGNOSIS — M79.671 RIGHT FOOT PAIN: ICD-10-CM

## 2018-12-12 NOTE — PROGRESS NOTES
HISTORY OF PRESENT ILLNESS:  Ms. Shira Cordero returns following for a grade two right lateral ankle sprain. She is wearing her short fracture walker and seems to be doing better compared to her initial visit with our office. PHYSICAL EXAM:  The fracture walker is removed today to reveal the skin is intact, fully surrounding the right lower extremity to include the ankle. She is full weightbearing of the right lower extremity. The pain has improved overall. She is not wearing the boot during the evening hours at home but does use it during the day always. The right ankle reveals trace swelling of the lateral posterior malleolus. The patients inversion and eversion are 3° and 5° respectively without pain. Plantarflexion is noted at 5° actively. Passive ds l tis about 5° as well. Distal sensation is intact fully to the right lower extremity. The patient has had some pain int eh medial plantar area of the right foot and has had plantar fasciitis previously. She feels like she is beginning to develop that again. PLAN:   In light of her overall improvement, I am going to advance her to an AirCast, which she is going to begin using within 24 hours tapering the use of the short fracture walker, ,taking two hours off each and then then increase cumulative two hours of the AirCast.  She should be out of the fracture walker by Sunday at least and will use the AirCast for about a week to 10 days. All her questions were answered to her satisfaction. A properly fitted AirCast was placed today.

## 2019-01-04 ENCOUNTER — OFFICE VISIT (OUTPATIENT)
Dept: ORTHOPEDIC SURGERY | Facility: CLINIC | Age: 69
End: 2019-01-04

## 2019-01-04 VITALS
WEIGHT: 166 LBS | BODY MASS INDEX: 32.59 KG/M2 | HEART RATE: 87 BPM | TEMPERATURE: 97.9 F | SYSTOLIC BLOOD PRESSURE: 130 MMHG | HEIGHT: 60 IN | RESPIRATION RATE: 16 BRPM | DIASTOLIC BLOOD PRESSURE: 90 MMHG | OXYGEN SATURATION: 95 %

## 2019-01-04 DIAGNOSIS — M79.671 RIGHT FOOT PAIN: ICD-10-CM

## 2019-01-04 DIAGNOSIS — S99.911D INJURY OF RIGHT ANKLE, SUBSEQUENT ENCOUNTER: Primary | ICD-10-CM

## 2019-01-04 NOTE — PROGRESS NOTES
HISTORY OF PRESENT ILLNESS:  Henri Sam returns for right foot grade II lateral ankle sprain. She is full weightbearing of her right ankle wearing a short zippered, mid-calf high boots. She has no pain to the right ankle. PHYSICAL EXAM:  Her swelling is resolved completely. She has inversion and eversion passively of the right foot at 5° and 8° without pain. Plantarflexion is noted against resistance at 10° actively. Dorsiflexion is 5° against resistance. Distal sensation is intact fully to the right lower extremity. Capillary refill is brisk and less than 2 seconds to the right lower extremity. PLAN:   The patient has followed care guidelines appropriately and transitioned from a short CAM walker to an AirCast, and now to her regular shoes. At this point, we are going to keep her in her regular shoe wear. She is going to follow back on a prn basis. All her questions were answered to her satisfaction.

## 2019-01-23 ENCOUNTER — OFFICE VISIT (OUTPATIENT)
Dept: INTERNAL MEDICINE CLINIC | Age: 69
End: 2019-01-23

## 2019-01-23 VITALS
DIASTOLIC BLOOD PRESSURE: 82 MMHG | HEART RATE: 91 BPM | OXYGEN SATURATION: 98 % | BODY MASS INDEX: 33.06 KG/M2 | TEMPERATURE: 97.9 F | RESPIRATION RATE: 14 BRPM | WEIGHT: 168.4 LBS | HEIGHT: 60 IN | SYSTOLIC BLOOD PRESSURE: 122 MMHG

## 2019-01-23 DIAGNOSIS — M17.11 PRIMARY OSTEOARTHRITIS OF RIGHT KNEE: ICD-10-CM

## 2019-01-23 DIAGNOSIS — Z01.818 PREOP EXAM FOR INTERNAL MEDICINE: Primary | ICD-10-CM

## 2019-01-23 DIAGNOSIS — E03.8 HYPOTHYROIDISM DUE TO HASHIMOTO'S THYROIDITIS: ICD-10-CM

## 2019-01-23 DIAGNOSIS — I10 PRIMARY HYPERTENSION: ICD-10-CM

## 2019-01-23 DIAGNOSIS — E06.3 HYPOTHYROIDISM DUE TO HASHIMOTO'S THYROIDITIS: ICD-10-CM

## 2019-01-23 DIAGNOSIS — K21.9 GASTROESOPHAGEAL REFLUX DISEASE WITHOUT ESOPHAGITIS: ICD-10-CM

## 2019-01-23 DIAGNOSIS — Z00.00 INITIAL MEDICARE ANNUAL WELLNESS VISIT: ICD-10-CM

## 2019-01-23 NOTE — PROGRESS NOTES
Marybeth Severs 1950, is a 80 y.o. female, who is seen today for preoperative evaluation prior to having right total knee replacement January 30. She has had ongoing pain and intermittent swelling in that knee for quite some time, last arthrocentesis was in October 2018 and bloody fluid was present. She has previously had knee replacement on the left and that was complicated by infection preoperatively. She has not been getting much relief from meloxicam and it was upsetting her stomach so she stopped using that already. She has had some pressure in her left ear off and on the last couple of weeks and would like me to check that. Past Medical History:  
Diagnosis Date  Allergic rhinitis  Asthma  Diverticulosis  DJD (degenerative joint disease)  DJD (degenerative joint disease)  GERD (gastroesophageal reflux disease)  Headache(784.0)  Hyperlipidemia  Hypothyroidism  Overweight(278.02)   
 with weight gain  Right knee pain Past Surgical History:  
Procedure Laterality Date  BREAST SURGERY PROCEDURE UNLISTED    
 reduction mammoplasty  ENDOSCOPY, COLON, DIAGNOSTIC    
 HX APPENDECTOMY  HX CHOLECYSTECTOMY  HX HYSTERECTOMY  HX KNEE ARTHROSCOPY    
 HX ORTHOPAEDIC    
 left knee  HX TONSILLECTOMY  HX TUBAL LIGATION Current Outpatient Medications Medication Sig Dispense Refill  omeprazole (PRILOSEC) 40 mg capsule Take 1 Cap by mouth two (2) times a day. 180 Cap 3  
 levothyroxine (SYNTHROID) 125 mcg tablet Take 1 Tab by mouth Daily (before breakfast). 90 Tab 3  
 meloxicam (MOBIC) 15 mg tablet Take 1 Tab by mouth daily. 90 Tab 3  
 estradiol (ESTRACE) 1 mg tablet Take 1 Tab by mouth daily. 90 Tab 3  
 docusate sodium (COLACE) 100 mg capsule Take 100 mg by mouth two (2) times a day.  cyanocobalamin (VITAMIN B-12) 1,000 mcg tablet Take 1,000 mcg by mouth daily. Allergies Allergen Reactions  Lipitor [Atorvastatin] Unknown (comments) Myalgia  Septra [Sulfamethoprim Ds] Rash  Sulfa (Sulfonamide Antibiotics) Hives  
  respiratory distress Social History Socioeconomic History  Marital status:  Spouse name: Not on file  Number of children: Not on file  Years of education: Not on file  Highest education level: Not on file Tobacco Use  Smoking status: Never Smoker  Smokeless tobacco: Never Used Substance and Sexual Activity  Alcohol use: Yes Comment: rarely  Drug use: No  
 
Review of systems: She denies exertional discomfort in the chest neck jaw back or arm. She denies dyspnea on exertion PND orthopnea and edema. She denies syncope or near syncope. Visit Vitals /82 Pulse 91 Temp 97.9 °F (36.6 °C) (Oral) Resp 14 Ht 5' (1.524 m) Wt 168 lb 6.4 oz (76.4 kg) SpO2 98% BMI 32.89 kg/m² Ear canals and tympanic membranes appear normal with good light reflex bilaterally. Carotids are 2+ without bruits. Lungs are clear to percussion. Good breath sounds with no wheezing or crackles. Heart reveals a regular rhythm with normal S1 and S2 no murmur gallop click or rub. Apical impulse is nonpalpable. Abdomen is soft and nontender with no hepatosplenomegaly or masses and no bruits. Extremities reveal no clubbing cyanosis or edema. Pulses are intact. Recent electrolytes and CBC were normal, chest x-ray is normal, and EKG was read as showing left anterior fascicular block and possible old anterior MI with no acute changes noted. I am unable to review that tracing, only the cardiologist's interpretation. Assessment: #1. Low risk for upcoming right total knee replacement, she is medically cleared for right knee replacement. She has already stopped meloxicam and she will avoid using aspirin and all nonsteroidals preoperatively.   She also has just stopped estrogen replacement therapy and will remain off that for 2 weeks postop as directed by the surgeon's office. She has no history of MI and probably the EKG is being over read but in any case the reading of possible old anterior infarction does not place her at high risk since there is nothing acute and she is asymptomatic. #2.  Obesity, she is going to continue working on gradual weight loss. #3. Hypothyroidism clinically euthyroid, thyroid level was normal when last checked in March and her dosage has not changed, she will continue levothyroxine 125 mcg daily. #4.  GERD doing well now, she will continue omeprazole 40 mg twice a day. #5.  Eustachian tube dysfunction, she will try using Sudafed to reduce the pressure in her right ear. She had a Medicare wellness evaluation today and I agree with Fan's note. Americo Saini, [address] Please note: This document has been produced using voice recognition software. Unrecognized errors in transcription may be present.

## 2019-01-23 NOTE — PATIENT INSTRUCTIONS
Medicare Part B Preventive Services Limitations Recommendation Follow-up Action Needed Bone Mass Measurement 
(age 72 & older, biennial) Requires diagnosis related to osteoporosis or estrogen deficiency. Biennial benefit unless patient has history of long-term glucocorticoid tx or baseline is needed because initial test was by other method Last: 11/29/12 A DEXA scan is recommended after you turn 72years of age to determine your risk for osteoporosis Next: Follow up as recommended by primary care provider and/or specialist   
Colorectal Cancer Screening 
-Fecal occult blood test (annual) -Flexible sigmoidoscopy (5y) 
-Screening colonoscopy (10y) -Barium Enema Normally recommended for patients age 48 - 78  Last: 1/16/17 Every 5-10 years depending on your colonoscopy result starting at age 48 years Next: Follow up requested in 5 years (due 1/2022) Glaucoma Screening (no USPSTF recommendation) Diabetes mellitus, family history, , age 48 or over,  American, age 72 or over Last: 8/13/18 Every year, or as directed by provider Next: Follow up as recommended by primary care provider and/or specialist   
Screening Mammography (biennial age 54-69) Annually (age 36 or over) Last: 12/6/17 Next: Follow up as recommended by primary care provider and/or specialist   
Screening Pap Tests and Pelvic Examination (up to age 72 and after 72 if unknown history or abnormal study last 10 years) Every 24 months except high risk Last: N/A Next: Discuss with your doctor if this screening is appropriate for you. Cardiovascular Screening Blood Tests (every 5 years) Total cholesterol, HDL, Triglycerides Order as a panel if possible Last: 3/29/18 Every Year Next: Follow up as recommended by primary care provider and/or specialist   
Diabetes Screening Tests (at least every 3 years, Medicare covers annually or at 6-month intervals for prediabetic patients) Fasting blood sugar (FBS) or glucose tolerance test (GTT) Patient must be diagnosed with one of the following: 
-Hypertension, Dyslipidemia, obesity, previous impaired FBS or GTT 
Or any two of the following: overweight, FH of diabetes, age ? 72, history of gestational diabetes, birth of baby weighing more than 9 pounds Last: 3/29/18 Every 3-6 months depending on your result Every 3 years Next: Follow up as recommended by primary care provider and/or specialist   
Diabetes Self-Management Training (DSMT) (no USPSTF recommendation) Requires referral by treating physician for patient with diabetes or renal disease. 10 hours of initial DSMT session of no less than 30 minutes each in a continuous 12-month period. 2 hours of follow-up DSMT in subsequent years. Last: N/A Talk to your doctor if you are interested in a refresher course. Medical Nutrition Therapy (MNT) (for diabetes or renal disease not recommended schedule) Requires referral by treating physician for patient with diabetes or renal disease. Can be provided in same year as diabetes self-management training (DSMT), and CMS recommends medical nutrition therapy take place after DSMT. Up to 3 hours for initial year and 2 hours in subsequent years. Last: N/A Talk to your doctor if you are interested in a refresher course. Seasonal Influenza Vaccination (annually)  Last: 10/7/18 Every Fall Please consider getting one every fall Pneumococcal Vaccination (once after 65)  Last: 
Pneumovax: 4/5/18 Prevnar-13: 11/11/16 Next: Follow up as recommended by primary care provider and/or specialist   
Shingles Vaccination  Last: 10/24/13 A shingles vaccine is recommended once in a lifetime after age 61 Shingrix available from your local pharmacy, CDC recommends getting this even if you have gotten the previous shingles vaccine Hepatitis B Vaccinations (if medium/high risk) Medium/high risk factors:  End-stage renal disease, 
 Hemophiliacs who received Factor VIII or IX concentrates, Clients of institutions for the mentally retarded, Persons who live in the same house as a HepB virus carrier, Homosexual men, Illicit injectable drug abusers. Last: N/A Next: N/A Counseling to Prevent Tobacco Use (up to 8 sessions per year) - Counseling greater than 3 and up to 10 minutes - Counseling greater than 10 minutes Patients must be asymptomatic of tobacco-related conditions to receive as preventive service  As recommended by your PCP or specialist   
Human Immunodeficiency Virus (HIV) Screening (annually for increased risk patients) HIV-1 and HIV-2 by EIA, KENDRICK, rapid antibody test, or oral mucosa transudate Patient must be at increased risk for HIV infection per USPSTF guidelines or pregnant. Tests covered annually for patients at increased risk. Pregnant patients may receive up to 3 test during pregnancy. As recommended by your PCP or Specialist  
Ultrasound Screening for Abdominal Aortic Aneurysm (AAA) once Patient must be referred through IPPE and not have had a screening for abdominal aortic aneurysm before under medicare. Limited to patients who meet onf of the following criteria: 
-Men who are 73-68 years old and have smoked more than 100 cigarettes in their lifetime 
-Anyone with a FH of AAA 
-Anyone recommended for screening by the USPSFTF As recommended by your PCP or Specialist 
  
NA = Not Applicable; NI= Not Indicated Advance Directives: Care Instructions Your Care Instructions An advance directive is a legal way to state your wishes at the end of your life. It tells your family and your doctor what to do if you can no longer say what you want. There are two main types of advance directives. You can change them any time that your wishes change. · A living will tells your family and your doctor your wishes about life support and other treatment.  
· A durable power of  for health care lets you name a person to make treatment decisions for you when you can't speak for yourself. This person is called a health care agent. If you do not have an advance directive, decisions about your medical care may be made by a doctor or a  who doesn't know you. It may help to think of an advance directive as a gift to the people who care for you. If you have one, they won't have to make tough decisions by themselves. Follow-up care is a key part of your treatment and safety. Be sure to make and go to all appointments, and call your doctor if you are having problems. It's also a good idea to know your test results and keep a list of the medicines you take. How can you care for yourself at home? · Discuss your wishes with your loved ones and your doctor. This way, there are no surprises. · Many states have a unique form. Or you might use a universal form that has been approved by many states. This kind of form can sometimes be completed and stored online. Your electronic copy will then be available wherever you have a connection to the Internet. In most cases, doctors will respect your wishes even if you have a form from a different state. · You don't need a  to do an advance directive. But you may want to get legal advice. · Think about these questions when you prepare an advance directive: ¨ Who do you want to make decisions about your medical care if you are not able to? Many people choose a family member or close friend. ¨ Do you know enough about life support methods that might be used? If not, talk to your doctor so you understand. ¨ What are you most afraid of that might happen? You might be afraid of having pain, losing your independence, or being kept alive by machines. ¨ Where would you prefer to die? Choices include your home, a hospital, or a nursing home. ¨ Would you like to have information about hospice care to support you and your family? ¨ Do you want to donate organs when you die? ¨ Do you want certain Denominational practices performed before you die? If so, put your wishes in the advance directive. · Read your advance directive every year, and make changes as needed. When should you call for help? Be sure to contact your doctor if you have any questions. Where can you learn more? Go to http://andrea-romario.info/. Enter R264 in the search box to learn more about \"Advance Directives: Care Instructions. \" Current as of: September 24, 2016 Content Version: 11.4 © 5821-1832 [employer] Care instructions adapted under license by [employer] (which disclaims liability or warranty for this information). If you have questions about a medical condition or this instruction, always ask your healthcare professional. [address] any warranty or liability for your use of this information.

## 2019-01-23 NOTE — PROGRESS NOTES
Dr. Cristina Palmer referred Facundo Mcgarry (1950) a 80 y.o. female for a Shane Visit ([address] This is an Initial Medicare Annual Wellness Exam (AWV) (Performed 12 months after IPPE or effective date of Medicare Part B enrollment, Once in a lifetime) I have reviewed the patient's medical history in detail and updated the computerized patient record. History Past Medical History:  
Diagnosis Date  Allergic rhinitis  Asthma  Diverticulosis  DJD (degenerative joint disease)  DJD (degenerative joint disease)  GERD (gastroesophageal reflux disease)  Headache(784.0)  Hyperlipidemia  Hypothyroidism  Overweight(278.02)   
 with weight gain  Right knee pain Past Surgical History:  
Procedure Laterality Date  BREAST SURGERY PROCEDURE UNLISTED    
 reduction mammoplasty  ENDOSCOPY, COLON, DIAGNOSTIC    
 HX APPENDECTOMY  HX CHOLECYSTECTOMY  HX HYSTERECTOMY  HX KNEE ARTHROSCOPY    
 HX ORTHOPAEDIC    
 left knee  HX TONSILLECTOMY  HX TUBAL LIGATION Current Outpatient Medications Medication Sig Dispense Refill  omeprazole (PRILOSEC) 40 mg capsule Take 1 Cap by mouth two (2) times a day. 180 Cap 3  
 levothyroxine (SYNTHROID) 125 mcg tablet Take 1 Tab by mouth Daily (before breakfast). 90 Tab 3  
 estradiol (ESTRACE) 1 mg tablet Take 1 Tab by mouth daily. 90 Tab 3  
 docusate sodium (COLACE) 100 mg capsule Take 100 mg by mouth two (2) times a day.  cyanocobalamin (VITAMIN B-12) 1,000 mcg tablet Take 1,000 mcg by mouth daily. Allergies Allergen Reactions  Lipitor [Atorvastatin] Unknown (comments) Myalgia  Septra [Sulfamethoprim Ds] Rash  Sulfa (Sulfonamide Antibiotics) Hives  
  respiratory distress Family History Problem Relation Age of Onset 24 [facility] Arthritis-osteo Mother  Heart Disease Mother  Diabetes Father  Cancer Father  Arthritis-osteo Brother  Diabetes Brother Social History Tobacco Use  Smoking status: Never Smoker  Smokeless tobacco: Never Used Substance Use Topics  Alcohol use: No  
  Frequency: Never Patient Active Problem List  
Diagnosis Code  Allergic rhinitis J30.9  Asthma J45.909  Generalized osteoarthrosis, involving multiple sites M15.9  Venous insufficiency I87.2  Arthritis of left knee M17.12  Right knee pain M25.561  Hyperlipidemia LDL goal <130 E78.5  Diverticulitis large intestine w/o perforation or abscess w/bleeding K57.33  
 Gastroesophageal reflux disease without esophagitis K21.9  Hypothyroidism due to Hashimoto's thyroiditis E03.8, E06.3  Weight loss, abnormal R63.4  Epigastric pain R10.13  Vitamin B12 deficiency E53.8  Primary osteoarthritis involving multiple joints M15.0 Depression Risk Factor Screening: PHQ over the last two weeks 1/23/2019 Little interest or pleasure in doing things Not at all Feeling down, depressed, irritable, or hopeless Not at all Total Score PHQ 2 0 Alcohol Risk Factor Screening: You do not drink alcohol or very rarely. Functional Ability and Level of Safety:  
 
Hearing Loss Hearing is good. Activities of Daily Living The home contains: no safety equipment Patient does total self care Requires assistance with: no ADLs ADL Assessment 1/23/2019 Feeding yourself No Help Needed Getting from bed to chair No Help Needed Getting dressed No Help Needed Bathing or showering No Help Needed Walk across the room (includes cane/walker) No Help Needed Using the telphone No Help Needed Taking your medications No Help Needed Preparing meals No Help Needed Managing money (expenses/bills) No Help Needed Moderately strenuous housework (laundry) No Help Needed Shopping for personal items (toiletries/medicines) No Help Needed Shopping for groceries No Help Needed Driving No Help Needed Climbing a flight of stairs No Help Needed Getting to places beyond walking distances No Help Needed Fall Risk Fall Risk Assessment, last 12 mths 1/23/2019 Able to walk? Yes Fall in past 12 months? No  
Fall with injury? -  
Number of falls in past 12 months - Fall Risk Score -  
 
Abuse Screen Patient is not abused Abuse Screening Questionnaire 1/23/2019 Do you ever feel afraid of your partner? Clearnce Beams Are you in a relationship with someone who physically or mentally threatens you? Clearnce Beams Is it safe for you to go home? Alexander Chapa Cognitive Screening Evaluation of Cognitive Function: 
Has your family/caregiver stated any concerns about your memory: no 
Normal 
 
Mood/affect:  neutral 
Appearance: age appropriate and within normal Limits Family member/caregiver input: N/A Patient Care Team: 
Betty Thornton MD as PCP - General (Internal Medicine) Laly Yi RN as Ambulatory Care Navigator (Internal Medicine) Jayant Steinberg MD (Ophthalmology) Torie Guillory MD (Ophthalmology) Tiffanie Loaiza MD (Orthopedic Surgery) Advice/Referrals/Counseling Education and counseling provided: 
End-of-Life planning (with patient's consent) Pneumococcal Vaccine Influenza Vaccine Screening Mammography Screening Pap and pelvic (covered once every 2 years) Colorectal cancer screening tests Cardiovascular screening blood test 
Bone mass measurement (DEXA) Screening for glaucoma Diabetes screening test 
Tdap / Zoster vaccination recommendations Assessment/Plan ICD-10-CM ICD-9-CM 1. Preop exam for internal medicine Z01.818 V72.83 2. Primary osteoarthritis of right knee M17.11 715.16 CBC WITH AUTOMATED DIFF  
   METABOLIC PANEL, COMPREHENSIVE 3. Hypothyroidism due to Hashimoto's thyroiditis E03.8 244.8 T4, FREE  
 E06.3 245.2 TSH 3RD GENERATION 4.  Gastroesophageal reflux disease without esophagitis K21.9 530.81 CBC WITH AUTOMATED DIFF  
   METABOLIC PANEL, COMPREHENSIVE 5. Primary hypertension I10 401.9 LIPID PANEL Michael Pandya 6. Medication Reconciliation: Performed today and patient did not bring her medications to the visit. and the following changes were made. Discontinued: below Additions: none Changes / other discrepancies: none Medications Discontinued During This Encounter Medication Reason  metroNIDAZOLE (FLAGYL) 500 mg tablet Therapy Completed  meloxicam (MOBIC) 15 mg tablet Therapy Completed 7. Immunizations: Patient confirmed the following records of vaccinations are correct and current. 
 -Pneumococcal: Vaccination series complete -Influenza: Done  
 -Zoster: Done previously. Educated about Shingrix availability Immunization History Administered Date(s) Administered  Influenza High Dose Vaccine PF 10/04/2017, 10/07/2018  Influenza Vaccine 11/23/2016  Influenza Vaccine (Quad) PF 11/09/2015, 11/11/2016  Influenza Vaccine PF 10/24/2013, 10/29/2014  Influenza Vaccine Split 10/11/2011, 10/19/2012  Influenza Vaccine Whole 09/24/2010  Pneumococcal Conjugate (PCV-13) 11/11/2016  Pneumococcal Polysaccharide (PPSV-23) 11/23/2016, 04/05/2018  TD Vaccine 04/21/2011  Zoster Vaccine, Live 10/24/2013 8. Screenings: (see patient instructions for chart/information): 
 -DEXA scan: Done  
 -Colonoscopy: Done, next follow up requested 7/2016 
 -Glaucoma screening/Eye exam: Goes yearly, up to date  
 -Mammogram: Last done 12/2017. Will follow up with PCP at next visit in March  
 -Lipid panel: Done  
 -Diabetes: Done 9. Advanced Care Planning: Patient educated on the importance of an advanced care plan and paperwork was given to the patient today. Asked patient to read over the information and bring it back to her next appointment with her physician.   
 
The patient was advised and counseled regarding advanced directives but is not interested in completing at this time. Patient verbalized understanding of information presented. Answered all of the patient's questions. AVS information was reviewed with patient and will be printed on checkout. Winifred Tiwari, PharmD, BCPS, BCACP, BC-ADM

## 2019-04-08 ENCOUNTER — HOSPITAL ENCOUNTER (OUTPATIENT)
Dept: LAB | Age: 69
Discharge: HOME | End: 2019-04-08
Payer: MEDICARE

## 2019-04-08 ENCOUNTER — APPOINTMENT (OUTPATIENT)
Dept: INTERNAL MEDICINE CLINIC | Age: 69
End: 2019-04-08

## 2019-04-08 DIAGNOSIS — E03.8 HYPOTHYROIDISM DUE TO HASHIMOTO'S THYROIDITIS: ICD-10-CM

## 2019-04-08 DIAGNOSIS — I10 PRIMARY HYPERTENSION: ICD-10-CM

## 2019-04-08 DIAGNOSIS — M17.11 PRIMARY OSTEOARTHRITIS OF RIGHT KNEE: ICD-10-CM

## 2019-04-08 DIAGNOSIS — K21.9 GASTROESOPHAGEAL REFLUX DISEASE WITHOUT ESOPHAGITIS: ICD-10-CM

## 2019-04-08 DIAGNOSIS — E06.3 HYPOTHYROIDISM DUE TO HASHIMOTO'S THYROIDITIS: ICD-10-CM

## 2019-04-08 LAB
ALBUMIN SERPL-MCNC: 4 G/DL (ref 3.4–5)
ALBUMIN/GLOB SERPL: 1.2 {RATIO} (ref 0.8–1.7)
ALP SERPL-CCNC: 69 U/L (ref 45–117)
ALT SERPL-CCNC: 22 U/L (ref 13–56)
ANION GAP SERPL CALC-SCNC: 10 MMOL/L (ref 3–18)
AST SERPL-CCNC: 15 U/L (ref 15–37)
BASOPHILS # BLD: 0 K/UL (ref 0–0.1)
BASOPHILS NFR BLD: 0 % (ref 0–2)
BILIRUB SERPL-MCNC: 0.4 MG/DL (ref 0.2–1)
BUN SERPL-MCNC: 19 MG/DL (ref 7–18)
BUN/CREAT SERPL: 26 (ref 12–20)
CALCIUM SERPL-MCNC: 9.2 MG/DL (ref 8.5–10.1)
CHLORIDE SERPL-SCNC: 104 MMOL/L (ref 100–108)
CHOLEST SERPL-MCNC: 295 MG/DL
CO2 SERPL-SCNC: 25 MMOL/L (ref 21–32)
CREAT SERPL-MCNC: 0.72 MG/DL (ref 0.6–1.3)
DIFFERENTIAL METHOD BLD: NORMAL
EOSINOPHIL # BLD: 0.1 K/UL (ref 0–0.4)
EOSINOPHIL NFR BLD: 1 % (ref 0–5)
ERYTHROCYTE [DISTWIDTH] IN BLOOD BY AUTOMATED COUNT: 13.6 % (ref 11.6–14.5)
GLOBULIN SER CALC-MCNC: 3.4 G/DL (ref 2–4)
GLUCOSE SERPL-MCNC: 92 MG/DL (ref 74–99)
HCT VFR BLD AUTO: 43.3 % (ref 35–45)
HDLC SERPL-MCNC: 83 MG/DL (ref 40–60)
HDLC SERPL: 3.6 {RATIO} (ref 0–5)
HGB BLD-MCNC: 14.5 G/DL (ref 12–16)
LDLC SERPL CALC-MCNC: 187.8 MG/DL (ref 0–100)
LIPID PROFILE,FLP: ABNORMAL
LYMPHOCYTES # BLD: 2.8 K/UL (ref 0.9–3.6)
LYMPHOCYTES NFR BLD: 26 % (ref 21–52)
MCH RBC QN AUTO: 31.1 PG (ref 24–34)
MCHC RBC AUTO-ENTMCNC: 33.5 G/DL (ref 31–37)
MCV RBC AUTO: 92.9 FL (ref 74–97)
MONOCYTES # BLD: 1 K/UL (ref 0.05–1.2)
MONOCYTES NFR BLD: 9 % (ref 3–10)
NEUTS SEG # BLD: 7 K/UL (ref 1.8–8)
NEUTS SEG NFR BLD: 64 % (ref 40–73)
PLATELET # BLD AUTO: 380 K/UL (ref 135–420)
PMV BLD AUTO: 10.6 FL (ref 9.2–11.8)
POTASSIUM SERPL-SCNC: 4.6 MMOL/L (ref 3.5–5.5)
PROT SERPL-MCNC: 7.4 G/DL (ref 6.4–8.2)
RBC # BLD AUTO: 4.66 M/UL (ref 4.2–5.3)
SODIUM SERPL-SCNC: 139 MMOL/L (ref 136–145)
T4 FREE SERPL-MCNC: 1.3 NG/DL (ref 0.7–1.5)
TRIGL SERPL-MCNC: 121 MG/DL (ref ?–150)
TSH SERPL DL<=0.05 MIU/L-ACNC: 2.1 UIU/ML (ref 0.36–3.74)
VLDLC SERPL CALC-MCNC: 24.2 MG/DL
WBC # BLD AUTO: 10.9 K/UL (ref 4.6–13.2)

## 2019-04-08 PROCEDURE — 84443 ASSAY THYROID STIM HORMONE: CPT

## 2019-04-08 PROCEDURE — 36415 COLL VENOUS BLD VENIPUNCTURE: CPT

## 2019-04-08 PROCEDURE — 85025 COMPLETE CBC W/AUTO DIFF WBC: CPT

## 2019-04-08 PROCEDURE — 84439 ASSAY OF FREE THYROXINE: CPT

## 2019-04-08 PROCEDURE — 80061 LIPID PANEL: CPT

## 2019-04-08 PROCEDURE — 80053 COMPREHEN METABOLIC PANEL: CPT

## 2019-04-15 ENCOUNTER — OFFICE VISIT (OUTPATIENT)
Dept: INTERNAL MEDICINE CLINIC | Age: 69
End: 2019-04-15

## 2019-04-15 VITALS
BODY MASS INDEX: 30.94 KG/M2 | DIASTOLIC BLOOD PRESSURE: 80 MMHG | RESPIRATION RATE: 16 BRPM | SYSTOLIC BLOOD PRESSURE: 124 MMHG | TEMPERATURE: 97.2 F | HEIGHT: 60 IN | OXYGEN SATURATION: 97 % | WEIGHT: 157.6 LBS

## 2019-04-15 DIAGNOSIS — Z12.31 SCREENING MAMMOGRAM, ENCOUNTER FOR: ICD-10-CM

## 2019-04-15 DIAGNOSIS — K21.9 GASTROESOPHAGEAL REFLUX DISEASE WITHOUT ESOPHAGITIS: ICD-10-CM

## 2019-04-15 DIAGNOSIS — E53.8 VITAMIN B12 DEFICIENCY: ICD-10-CM

## 2019-04-15 DIAGNOSIS — M81.0 POSTMENOPAUSAL OSTEOPOROSIS: ICD-10-CM

## 2019-04-15 DIAGNOSIS — E06.3 HYPOTHYROIDISM DUE TO HASHIMOTO'S THYROIDITIS: Primary | ICD-10-CM

## 2019-04-15 DIAGNOSIS — E03.8 HYPOTHYROIDISM DUE TO HASHIMOTO'S THYROIDITIS: Primary | ICD-10-CM

## 2019-04-15 DIAGNOSIS — E78.5 HYPERLIPIDEMIA LDL GOAL <130: ICD-10-CM

## 2019-04-15 NOTE — PROGRESS NOTES
Fabio Knight 1950, is a 77 y.o. female, who is seen today for reevaluation of hypothyroidism, GERD, obesity, and hyperlipidemia. She eats a healthy diet but has not been able to lose weight. Weight is 1 pound different than it was a year ago. She has had good results with right total knee replacement and is getting around well on the knee without pain now. While at the hospital she was sitting on the arm for device and inserted. After 20 minutes and she kept calling the staff in the left her sitting there for a total of 2 hours before they finally came in and found with the problems but she has been having pain on that right buttock ever since then. She has seen a chiropractor who is also a physical therapist and those treatments really did not help, she saw another doctor at his recommendation did an SI joint injection and that of course did not help. She had an x-ray of her spine and is going to have an MRI, even though the pain is coming from the buttocks. He otherwise is feeling well, she takes her thyroid and her current medicine regularly and is having no reflux. She is due for mammography. She also has not had bone density checked for quite a few years. Past Medical History:  
Diagnosis Date  Allergic rhinitis  Asthma  Diverticulosis  DJD (degenerative joint disease)  DJD (degenerative joint disease)  GERD (gastroesophageal reflux disease)  Headache(784.0)  Hyperlipidemia  Hypothyroidism  Overweight(278.02)   
 with weight gain  Right knee pain Past Surgical History:  
Procedure Laterality Date  BREAST SURGERY PROCEDURE UNLISTED    
 reduction mammoplasty  ENDOSCOPY, COLON, DIAGNOSTIC    
 HX APPENDECTOMY  HX CHOLECYSTECTOMY  HX HYSTERECTOMY  HX KNEE ARTHROSCOPY    
 HX ORTHOPAEDIC    
 left knee  HX TONSILLECTOMY  HX TUBAL LIGATION Current Outpatient Medications Medication Sig Dispense Refill  omeprazole (PRILOSEC) 40 mg capsule Take 1 Cap by mouth two (2) times a day. 180 Cap 3  
 levothyroxine (SYNTHROID) 125 mcg tablet Take 1 Tab by mouth Daily (before breakfast). 90 Tab 3  cyanocobalamin (VITAMIN B-12) 1,000 mcg tablet Take 1,000 mcg by mouth daily. Allergies Allergen Reactions  Atorvastatin Unknown (comments) and Other (comments) Myalgia Myalgia  Septra [Sulfamethoprim Ds] Shortness of Breath and Rash  Sulfa (Sulfonamide Antibiotics) Hives, Shortness of Breath and Other (comments)  
  respiratory distress Social History Socioeconomic History  Marital status:  Spouse name: Not on file  Number of children: Not on file  Years of education: Not on file  Highest education level: Not on file Tobacco Use  Smoking status: Never Smoker  Smokeless tobacco: Never Used Substance and Sexual Activity  Alcohol use: No  
  Frequency: Never  Drug use: No  
 
Visit Vitals /80 (BP 1 Location: Left arm, BP Patient Position: Sitting) Temp 97.2 °F (36.2 °C) (Oral) Resp 16 Ht 5' (1.524 m) Wt 157 lb 9.6 oz (71.5 kg) SpO2 97% BMI 30.78 kg/m² Ear canals and tympanic membranes appear normal with good light reflex bilaterally. Oral cavity reveals no lesions. Neck reveals no adenopathy or thyromegaly. Carotids are 2+ without bruits. Lungs are clear to percussion. Good breath sounds with no wheezing or crackles. Heart reveals a regular rhythm with normal S1 and S2 no murmur gallop click or rub. Apical impulse is not palpable. Abdomen is soft and nontender with no hepatosplenomegaly or masses and no bruits. Breasts have scars from prior breast reduction bilaterally, with no  masses and no x-ray adenopathy. Results for orders placed or performed during the hospital encounter of 04/08/19 CBC WITH AUTOMATED DIFF Result Value Ref Range WBC 10.9 4.6 - 13.2 K/uL  
 RBC 4.66 4.20 - 5.30 M/uL HGB 14.5 12.0 - 16.0 g/dL HCT 43.3 35.0 - 45.0 % MCV 92.9 74.0 - 97.0 FL  
 MCH 31.1 24.0 - 34.0 PG  
 MCHC 33.5 31.0 - 37.0 g/dL  
 RDW 13.6 11.6 - 14.5 % PLATELET 150 152 - 723 K/uL MPV 10.6 9.2 - 11.8 FL  
 NEUTROPHILS 64 40 - 73 % LYMPHOCYTES 26 21 - 52 % MONOCYTES 9 3 - 10 % EOSINOPHILS 1 0 - 5 % BASOPHILS 0 0 - 2 %  
 ABS. NEUTROPHILS 7.0 1.8 - 8.0 K/UL  
 ABS. LYMPHOCYTES 2.8 0.9 - 3.6 K/UL  
 ABS. MONOCYTES 1.0 0.05 - 1.2 K/UL  
 ABS. EOSINOPHILS 0.1 0.0 - 0.4 K/UL  
 ABS. BASOPHILS 0.0 0.0 - 0.1 K/UL  
 DF AUTOMATED    
LIPID PANEL Result Value Ref Range LIPID PROFILE Cholesterol, total 295 (H) <200 MG/DL Triglyceride 121 <150 MG/DL  
 HDL Cholesterol 83 (H) 40 - 60 MG/DL  
 LDL, calculated 187.8 (H) 0 - 100 MG/DL VLDL, calculated 24.2 MG/DL  
 CHOL/HDL Ratio 3.6 0 - 5.0 METABOLIC PANEL, COMPREHENSIVE Result Value Ref Range Sodium 139 136 - 145 mmol/L Potassium 4.6 3.5 - 5.5 mmol/L Chloride 104 100 - 108 mmol/L  
 CO2 25 21 - 32 mmol/L Anion gap 10 3.0 - 18 mmol/L Glucose 92 74 - 99 mg/dL BUN 19 (H) 7.0 - 18 MG/DL Creatinine 0.72 0.6 - 1.3 MG/DL  
 BUN/Creatinine ratio 26 (H) 12 - 20 GFR est AA >60 >60 ml/min/1.73m2 GFR est non-AA >60 >60 ml/min/1.73m2 Calcium 9.2 8.5 - 10.1 MG/DL Bilirubin, total 0.4 0.2 - 1.0 MG/DL  
 ALT (SGPT) 22 13 - 56 U/L  
 AST (SGOT) 15 15 - 37 U/L Alk. phosphatase 69 45 - 117 U/L Protein, total 7.4 6.4 - 8.2 g/dL Albumin 4.0 3.4 - 5.0 g/dL Globulin 3.4 2.0 - 4.0 g/dL A-G Ratio 1.2 0.8 - 1.7 T4, FREE Result Value Ref Range T4, Free 1.3 0.7 - 1.5 NG/DL  
TSH 3RD GENERATION Result Value Ref Range TSH 2.10 0.36 - 3.74 uIU/mL Assessment: #1. Hypothyroidism with normal TSH and free T4. She will continue levothyroxine 125 mcg daily. #2.  Mild obesity, of encouraged her to do the best she can to keep her weight down.   #3.  GERD asymptomatic, she will continue omeprazole 40 mg daily. #4.  Hyperlipidemia, American Heart Association calculated risk is approximately 7% but would only improved to 5.6% with statin therapy. She has had myalgia with statins in the past, one for the reason not to use statin therapy at this point. She will do the best she can lose some weight and she will continue a low-fat diet. #5.  Ongoing right buttock pain, probable sciatic nerve injury from sitting on some type of alarm for 2 hours. Hopefully this will eventually improve. Follow-up for complete evaluation in 1 year, or sooner if needed. I will order mammography and bone density for her at this point. She will schedule those on her own out of our network.

## 2019-04-15 NOTE — PROGRESS NOTES
Chief Complaint Patient presents with  Physical  
 Cholesterol Problem  Labs  
  done 4-8-19 1. Have you been to the ER, urgent care clinic since your last visit? Hospitalized since your last visit? No 
 
2. Have you seen or consulted any other health care providers outside of the [address] [place] [address] since your last visit? Include any pap smears or colon screening. No 
 
Patient was given a copy of the Advanced Directive and understands to bring it in once completed. Health Maintenance Due Topic Date Due  Shingrix Vaccine Age 50> (1 of 2) 02/02/2001  DTaP/Tdap/Td series (1 - Tdap) 04/22/2011

## 2019-04-19 ENCOUNTER — TELEPHONE (OUTPATIENT)
Dept: INTERNAL MEDICINE CLINIC | Age: 69
End: 2019-04-19

## 2019-04-19 NOTE — TELEPHONE ENCOUNTER
Chief Complaint   Patient presents with    Results     done 04-18-19 Dexa Bone Density Study per Dr Apurva Christiansen     04-19-19 Patient reached and 2 identifiers were used: Full Name, and Date of Birth verified. The patient was given the results, and understands all. The patient has no further questions at this time.

## 2019-04-19 NOTE — TELEPHONE ENCOUNTER
----- Message from Claude Camacho MD sent at 4/19/2019  9:58 AM EDT -----  Please notify the patient that her bone density is actually quite good, all the readings were normal except one in the femur that was -1.1, barely out of the normal range. This is mild osteopenia, not anywhere close to osteoporosis which is -2.5 or worse.

## 2019-04-24 ENCOUNTER — TELEPHONE (OUTPATIENT)
Dept: INTERNAL MEDICINE CLINIC | Age: 69
End: 2019-04-24

## 2019-04-28 RX ORDER — ESTRADIOL 1 MG/1
TABLET ORAL
Qty: 90 TAB | Refills: 3 | Status: SHIPPED | OUTPATIENT
Start: 2019-04-28 | End: 2020-04-20 | Stop reason: SDUPTHER

## 2019-07-28 RX ORDER — ESTRADIOL 1 MG/1
TABLET ORAL
Qty: 90 TAB | Refills: 3 | Status: CANCELLED | OUTPATIENT
Start: 2019-07-28

## 2019-07-29 NOTE — TELEPHONE ENCOUNTER
Sent [employer] message informing patient she should still have 3 refills on file with the pharmacy and to contact them for the refill.

## 2019-11-11 RX ORDER — LEVOTHYROXINE SODIUM 125 UG/1
TABLET ORAL
Qty: 90 TAB | Refills: 3 | Status: SHIPPED | OUTPATIENT
Start: 2019-11-11 | End: 2020-11-06 | Stop reason: SDUPTHER

## 2020-04-06 ENCOUNTER — TELEPHONE (OUTPATIENT)
Dept: INTERNAL MEDICINE CLINIC | Age: 70
End: 2020-04-06

## 2020-04-06 DIAGNOSIS — E53.8 VITAMIN B12 DEFICIENCY: Primary | ICD-10-CM

## 2020-04-06 NOTE — TELEPHONE ENCOUNTER
Patient wanting to see if the Vitamin B12 lab can be added due to her deficiency. Patient also wanted to know if mammograms are still being done because hers is due in April and wants to know if she should get it scheduled or wait until after the virus. Patient rescheduled her Physical until July.

## 2020-04-06 NOTE — TELEPHONE ENCOUNTER
Slick Ng MD  [facility] 9 minutes ago (3:20 PM)      Mammogram probably should wait until July. Routing comment      Pt aware of message below and verbalized understanding. No further questions or concerns from pt at this time.

## 2020-04-20 RX ORDER — ESTRADIOL 1 MG/1
1 TABLET ORAL DAILY
Qty: 90 TAB | Refills: 3 | Status: SHIPPED | OUTPATIENT
Start: 2020-04-20 | End: 2021-04-26 | Stop reason: SDUPTHER

## 2020-04-20 NOTE — TELEPHONE ENCOUNTER
PCP: Chris Vidal MD    Last appt: 4/15/2019  Future Appointments   Date Time Provider [facility]   7/15/2020  9:00 AM [facility] NURSE VISIT [address]   7/22/2020  3:30 PM Chris Vidal MD [address]       Requested Prescriptions     Pending Prescriptions Disp Refills    estradioL (ESTRACE) 1 mg tablet 90 Tab 3

## 2020-07-15 ENCOUNTER — HOSPITAL ENCOUNTER (OUTPATIENT)
Dept: LAB | Age: 70
Discharge: HOME | End: 2020-07-15
Payer: MEDICARE

## 2020-07-15 ENCOUNTER — APPOINTMENT (OUTPATIENT)
Dept: INTERNAL MEDICINE CLINIC | Age: 70
End: 2020-07-15

## 2020-07-15 DIAGNOSIS — E06.3 HYPOTHYROIDISM DUE TO HASHIMOTO'S THYROIDITIS: ICD-10-CM

## 2020-07-15 DIAGNOSIS — E53.8 VITAMIN B12 DEFICIENCY: ICD-10-CM

## 2020-07-15 DIAGNOSIS — K21.9 GASTROESOPHAGEAL REFLUX DISEASE WITHOUT ESOPHAGITIS: ICD-10-CM

## 2020-07-15 DIAGNOSIS — E03.8 HYPOTHYROIDISM DUE TO HASHIMOTO'S THYROIDITIS: ICD-10-CM

## 2020-07-15 DIAGNOSIS — E78.5 HYPERLIPIDEMIA LDL GOAL <130: ICD-10-CM

## 2020-07-15 LAB
ALBUMIN SERPL-MCNC: 3.8 G/DL (ref 3.4–5)
ALBUMIN/GLOB SERPL: 1.2 {RATIO} (ref 0.8–1.7)
ALP SERPL-CCNC: 69 U/L (ref 45–117)
ALT SERPL-CCNC: 16 U/L (ref 13–56)
ANION GAP SERPL CALC-SCNC: 6 MMOL/L (ref 3–18)
AST SERPL-CCNC: 15 U/L (ref 10–38)
BASOPHILS # BLD: 0 K/UL (ref 0–0.1)
BASOPHILS NFR BLD: 0 % (ref 0–2)
BILIRUB SERPL-MCNC: 0.4 MG/DL (ref 0.2–1)
BUN SERPL-MCNC: 15 MG/DL (ref 7–18)
BUN/CREAT SERPL: 21 (ref 12–20)
CALCIUM SERPL-MCNC: 8.9 MG/DL (ref 8.5–10.1)
CHLORIDE SERPL-SCNC: 108 MMOL/L (ref 100–111)
CHOLEST SERPL-MCNC: 250 MG/DL
CO2 SERPL-SCNC: 28 MMOL/L (ref 21–32)
CREAT SERPL-MCNC: 0.7 MG/DL (ref 0.6–1.3)
DIFFERENTIAL METHOD BLD: NORMAL
EOSINOPHIL # BLD: 0.1 K/UL (ref 0–0.4)
EOSINOPHIL NFR BLD: 2 % (ref 0–5)
ERYTHROCYTE [DISTWIDTH] IN BLOOD BY AUTOMATED COUNT: 13.1 % (ref 11.6–14.5)
GLOBULIN SER CALC-MCNC: 3.3 G/DL (ref 2–4)
GLUCOSE SERPL-MCNC: 96 MG/DL (ref 74–99)
HCT VFR BLD AUTO: 39.7 % (ref 35–45)
HDLC SERPL-MCNC: 77 MG/DL (ref 40–60)
HDLC SERPL: 3.2 {RATIO} (ref 0–5)
HGB BLD-MCNC: 13.3 G/DL (ref 12–16)
LDLC SERPL CALC-MCNC: 138.6 MG/DL (ref 0–100)
LIPID PROFILE,FLP: ABNORMAL
LYMPHOCYTES # BLD: 2.3 K/UL (ref 0.9–3.6)
LYMPHOCYTES NFR BLD: 29 % (ref 21–52)
MCH RBC QN AUTO: 30.8 PG (ref 24–34)
MCHC RBC AUTO-ENTMCNC: 33.5 G/DL (ref 31–37)
MCV RBC AUTO: 91.9 FL (ref 74–97)
MONOCYTES # BLD: 0.7 K/UL (ref 0.05–1.2)
MONOCYTES NFR BLD: 9 % (ref 3–10)
NEUTS SEG # BLD: 4.9 K/UL (ref 1.8–8)
NEUTS SEG NFR BLD: 60 % (ref 40–73)
PLATELET # BLD AUTO: 329 K/UL (ref 135–420)
PMV BLD AUTO: 10.6 FL (ref 9.2–11.8)
POTASSIUM SERPL-SCNC: 4.3 MMOL/L (ref 3.5–5.5)
PROT SERPL-MCNC: 7.1 G/DL (ref 6.4–8.2)
RBC # BLD AUTO: 4.32 M/UL (ref 4.2–5.3)
SODIUM SERPL-SCNC: 142 MMOL/L (ref 136–145)
T4 FREE SERPL-MCNC: 1.3 NG/DL (ref 0.7–1.5)
TRIGL SERPL-MCNC: 172 MG/DL (ref ?–150)
TSH SERPL DL<=0.05 MIU/L-ACNC: 0.56 UIU/ML (ref 0.36–3.74)
VIT B12 SERPL-MCNC: 682 PG/ML (ref 211–911)
VLDLC SERPL CALC-MCNC: 34.4 MG/DL
WBC # BLD AUTO: 8.2 K/UL (ref 4.6–13.2)

## 2020-07-15 PROCEDURE — 84439 ASSAY OF FREE THYROXINE: CPT

## 2020-07-15 PROCEDURE — 80061 LIPID PANEL: CPT

## 2020-07-15 PROCEDURE — 80053 COMPREHEN METABOLIC PANEL: CPT

## 2020-07-15 PROCEDURE — 82607 VITAMIN B-12: CPT

## 2020-07-15 PROCEDURE — 85025 COMPLETE CBC W/AUTO DIFF WBC: CPT

## 2020-07-15 PROCEDURE — 84443 ASSAY THYROID STIM HORMONE: CPT

## 2020-07-15 PROCEDURE — 36415 COLL VENOUS BLD VENIPUNCTURE: CPT

## 2020-07-22 ENCOUNTER — OFFICE VISIT (OUTPATIENT)
Dept: INTERNAL MEDICINE CLINIC | Age: 70
End: 2020-07-22

## 2020-07-22 VITALS
SYSTOLIC BLOOD PRESSURE: 134 MMHG | WEIGHT: 166 LBS | TEMPERATURE: 97.6 F | HEIGHT: 60 IN | RESPIRATION RATE: 12 BRPM | HEART RATE: 80 BPM | BODY MASS INDEX: 32.59 KG/M2 | DIASTOLIC BLOOD PRESSURE: 84 MMHG

## 2020-07-22 DIAGNOSIS — E03.8 HYPOTHYROIDISM DUE TO HASHIMOTO'S THYROIDITIS: Primary | ICD-10-CM

## 2020-07-22 DIAGNOSIS — E06.3 HYPOTHYROIDISM DUE TO HASHIMOTO'S THYROIDITIS: Primary | ICD-10-CM

## 2020-07-22 DIAGNOSIS — M15.9 GENERALIZED OSTEOARTHROSIS, INVOLVING MULTIPLE SITES: ICD-10-CM

## 2020-07-22 DIAGNOSIS — K21.9 GASTROESOPHAGEAL REFLUX DISEASE WITHOUT ESOPHAGITIS: ICD-10-CM

## 2020-07-22 DIAGNOSIS — Z12.31 SCREENING MAMMOGRAM, ENCOUNTER FOR: ICD-10-CM

## 2020-07-22 DIAGNOSIS — E78.5 HYPERLIPIDEMIA LDL GOAL <130: ICD-10-CM

## 2020-07-22 NOTE — PROGRESS NOTES
Ramin Fernandes 1950, is a 72 y.o. female, who is seen today for reevaluation of hypothyroidism mild obesity GERD hyperlipidemia and arthritis. She tore some tissue in her right shoulder but she also has pain in the left shoulder and her left elbow and sometimes in the right third MP joint. She is tried taking 2 Aleve and that sometimes helps and sometimes does not. She had cortisone shots in her right shoulder but the orthopedist will not give her any more shots in that shoulder at this point. She is a lot better than she was. No other new complaints. She has her dog to keep her company and is very happy with that. Past Medical History:   Diagnosis Date    Allergic rhinitis     Asthma     Diverticulosis     DJD (degenerative joint disease)     DJD (degenerative joint disease)     GERD (gastroesophageal reflux disease)     Headache(784.0)     Hyperlipidemia     Hypothyroidism     Overweight(278.02)     with weight gain    Right knee pain      Past Surgical History:   Procedure Laterality Date    BREAST SURGERY PROCEDURE UNLISTED      reduction mammoplasty    ENDOSCOPY, COLON, DIAGNOSTIC      HX APPENDECTOMY      HX CHOLECYSTECTOMY      HX HYSTERECTOMY      HX KNEE ARTHROSCOPY      HX ORTHOPAEDIC      left knee    HX TONSILLECTOMY      HX TUBAL LIGATION       Current Outpatient Medications   Medication Sig Dispense Refill    estradioL (ESTRACE) 1 mg tablet Take 1 Tab by mouth daily. 90 Tab 3    levothyroxine (SYNTHROID) 125 mcg tablet take 1 tablet by mouth once daily BEFORE BREAKFAST 90 Tab 3    omeprazole (PRILOSEC) 40 mg capsule Take 1 Cap by mouth two (2) times a day. 180 Cap 3    cyanocobalamin (VITAMIN B-12) 1,000 mcg tablet Take 1,000 mcg by mouth daily.        Allergies   Allergen Reactions    Atorvastatin Unknown (comments) and Other (comments)     Myalgia    Myalgia    Septra [Sulfamethoprim Ds] Shortness of Breath and Rash    Sulfa (Sulfonamide Antibiotics) Hives, Shortness of Breath and Other (comments)     respiratory distress     Social History     Socioeconomic History    Marital status:      Spouse name: Not on file    Number of children: Not on file    Years of education: Not on file    Highest education level: Not on file   Tobacco Use    Smoking status: Never Smoker    Smokeless tobacco: Never Used   Substance and Sexual Activity    Alcohol use: No     Frequency: Never    Drug use: No     Visit Vitals  /84   Pulse 80   Temp 97.6 °F (36.4 °C) (Temporal)   Resp 12   Ht 5' (1.524 m)   Wt 166 lb (75.3 kg)   BMI 32.42 kg/m²     Carotids are 2+ without bruits. No adenopathy or thyromegaly. Lungs are clear to percussion. Good breath sounds with no wheezing or crackles. Heart reveals a regular rhythm with normal S1 and S2 no murmur gallop click or rub. Abdomen soft and nontender with no hepatosplenomegaly or masses and no bruits. Extremities reveal no clubbing cyanosis or edema. Pulses are 2+. Results for orders placed or performed during the hospital encounter of 07/15/20   CBC WITH AUTOMATED DIFF   Result Value Ref Range    WBC 8.2 4.6 - 13.2 K/uL    RBC 4.32 4.20 - 5.30 M/uL    HGB 13.3 12.0 - 16.0 g/dL    HCT 39.7 35.0 - 45.0 %    MCV 91.9 74.0 - 97.0 FL    MCH 30.8 24.0 - 34.0 PG    MCHC 33.5 31.0 - 37.0 g/dL    RDW 13.1 11.6 - 14.5 %    PLATELET 130 819 - 704 K/uL    MPV 10.6 9.2 - 11.8 FL    NEUTROPHILS 60 40 - 73 %    LYMPHOCYTES 29 21 - 52 %    MONOCYTES 9 3 - 10 %    EOSINOPHILS 2 0 - 5 %    BASOPHILS 0 0 - 2 %    ABS. NEUTROPHILS 4.9 1.8 - 8.0 K/UL    ABS. LYMPHOCYTES 2.3 0.9 - 3.6 K/UL    ABS. MONOCYTES 0.7 0.05 - 1.2 K/UL    ABS. EOSINOPHILS 0.1 0.0 - 0.4 K/UL    ABS.  BASOPHILS 0.0 0.0 - 0.1 K/UL    DF AUTOMATED     LIPID PANEL   Result Value Ref Range    LIPID PROFILE          Cholesterol, total 250 (H) <200 MG/DL    Triglyceride 172 (H) <150 MG/DL    HDL Cholesterol 77 (H) 40 - 60 MG/DL    LDL, calculated 138.6 (H) 0 - 100 MG/DL    VLDL, calculated 34.4 MG/DL    CHOL/HDL Ratio 3.2 0 - 5.0     T4, FREE   Result Value Ref Range    T4, Free 1.3 0.7 - 1.5 NG/DL   TSH 3RD GENERATION   Result Value Ref Range    TSH 0.56 0.36 - 5.52 uIU/mL   METABOLIC PANEL, COMPREHENSIVE   Result Value Ref Range    Sodium 142 136 - 145 mmol/L    Potassium 4.3 3.5 - 5.5 mmol/L    Chloride 108 100 - 111 mmol/L    CO2 28 21 - 32 mmol/L    Anion gap 6 3.0 - 18 mmol/L    Glucose 96 74 - 99 mg/dL    BUN 15 7.0 - 18 MG/DL    Creatinine 0.70 0.6 - 1.3 MG/DL    BUN/Creatinine ratio 21 (H) 12 - 20      GFR est AA >60 >60 ml/min/1.73m2    GFR est non-AA >60 >60 ml/min/1.73m2    Calcium 8.9 8.5 - 10.1 MG/DL    Bilirubin, total 0.4 0.2 - 1.0 MG/DL    ALT (SGPT) 16 13 - 56 U/L    AST (SGOT) 15 10 - 38 U/L    Alk. phosphatase 69 45 - 117 U/L    Protein, total 7.1 6.4 - 8.2 g/dL    Albumin 3.8 3.4 - 5.0 g/dL    Globulin 3.3 2.0 - 4.0 g/dL    A-G Ratio 1.2 0.8 - 1.7     VITAMIN B12   Result Value Ref Range    Vitamin B12 682 211 - 911 pg/mL     Assessment: Hypothyroidism doing well, normal TSH and free T4. She will continue levothyroxine 125 mcg daily. #2.  Mild obesity she has gained 9 pounds in the last year mainly during the pandemic where she has been home all the time. She is going to try to avoid any further weight gain. #3.  DJD in several joints including shoulders and the third right MP joint, I recommended she try using 2 Aleve twice a day for a couple of weeks and see if that will help her. She may use it intermittently after that. #4.  GERD asymptomatic, she will continue omeprazole 40 mg daily. #5. Hyperlipidemia, unclear why it is so much better this year than last year. She has gained weight. She will continue healthy diet and try to keep her weight down. Follow-up 1 year with lab with Dr. Syeda Whaley. I will order mammogram for her, she gets those at [address]. Amy Pillai MD FACP    Please note:   This document has been produced using voice recognition software. Unrecognized errors in transcription may be present.

## 2020-07-27 RX ORDER — PANTOPRAZOLE SODIUM 40 MG/1
40 TABLET, DELAYED RELEASE ORAL DAILY
Qty: 90 TAB | Refills: 3
Start: 2020-07-27 | End: 2021-07-28

## 2020-08-21 ENCOUNTER — TELEPHONE (OUTPATIENT)
Dept: INTERNAL MEDICINE CLINIC | Age: 70
End: 2020-08-21

## 2020-08-21 ENCOUNTER — VIRTUAL VISIT (OUTPATIENT)
Dept: INTERNAL MEDICINE CLINIC | Age: 70
End: 2020-08-21

## 2020-08-21 DIAGNOSIS — K57.92 DIVERTICULITIS: Primary | ICD-10-CM

## 2020-08-21 PROBLEM — K75.81 NASH (NONALCOHOLIC STEATOHEPATITIS): Status: ACTIVE | Noted: 2020-08-21

## 2020-08-21 PROBLEM — R10.13 EPIGASTRIC PAIN: Status: RESOLVED | Noted: 2017-06-16 | Resolved: 2020-08-21

## 2020-08-21 PROBLEM — M47.816 LUMBAR FACET ARTHROPATHY: Status: ACTIVE | Noted: 2020-08-21

## 2020-08-21 PROBLEM — K21.9 GERD (GASTROESOPHAGEAL REFLUX DISEASE): Status: ACTIVE | Noted: 2020-08-21

## 2020-08-21 PROBLEM — N95.1 MENOPAUSAL SYMPTOMS: Status: ACTIVE | Noted: 2020-08-21

## 2020-08-21 PROBLEM — R63.4 WEIGHT LOSS, ABNORMAL: Status: RESOLVED | Noted: 2017-06-16 | Resolved: 2020-08-21

## 2020-08-21 PROBLEM — K21.9 GERD (GASTROESOPHAGEAL REFLUX DISEASE): Status: RESOLVED | Noted: 2020-08-21 | Resolved: 2020-08-21

## 2020-08-21 PROBLEM — Z86.39 HISTORY OF NON ANEMIC VITAMIN B12 DEFICIENCY: Status: ACTIVE | Noted: 2020-08-21

## 2020-08-21 PROBLEM — M15.9 PRIMARY OSTEOARTHRITIS INVOLVING MULTIPLE JOINTS: Status: RESOLVED | Noted: 2018-04-05 | Resolved: 2020-08-21

## 2020-08-21 RX ORDER — METRONIDAZOLE 500 MG/1
500 TABLET ORAL 3 TIMES DAILY
Qty: 30 TAB | Refills: 0 | Status: SHIPPED | OUTPATIENT
Start: 2020-08-21 | End: 2021-10-01 | Stop reason: ALTCHOICE

## 2020-08-21 RX ORDER — CIPROFLOXACIN 500 MG/1
500 TABLET ORAL 2 TIMES DAILY
Qty: 20 TAB | Refills: 0 | Status: SHIPPED | OUTPATIENT
Start: 2020-08-21 | End: 2021-07-28

## 2020-08-21 NOTE — PROGRESS NOTES
Paco Trinidad is a 72 y.o. female who was seen by synchronous (real-time) audio-video technology on 8/21/2020. Assessment & Plan:   Diverticulitis: Presumed per hx.  - Ordering ciprofloxacin and metronidazole courses. - I instructed her to notify me if her sx improve but do not resolve. She was told to go to the ED if sx worsen. Lab review: labs are reviewed in the EHR     I have discussed the diagnosis with the patient and the intended plan as seen in the above orders. I have discussed medication side effects and warnings with the patient as well. I have reviewed the plan of care with the patient, accepted their input and they are in agreement with the treatment goals. All questions were answered. The patient understands the plan of care. Pt instructed if symptoms worsen to call the office or report to the ED for continued care. Greater than 50% of the visit time was spent in counseling and/or coordination of care. Voice recognition was used to generate this report, which may have resulted in some phonetic based errors in grammar and contents. Even though attempts were made to correct all the mistakes, some may have been missed, and remained in the body of the document. Subjective:   Paco Trinidad was seen for   Chief Complaint   Patient presents with    Abdominal Pain     The pt is a 70yo female with h/o GERD, diverticulitis, menopausal sx, HLD, DYER (seen on imaging studies), lumbar facet arthropathy (seen in imaging), tubular adenomatous colon polyp, B12 deficiency (2017). and hypothyroidisim    Diverticulitis:  She has had recurrent diverticulitis per her hx for >10 yrs. Sx respond well to ciprofloxacin and flagyl. She has similar sx today. She is reporting LLQ abdominal pain associated with loose stools. No hematochezia/melena. No urinary or vaginal sx. No sick contacts. No fever or N/V. No alleviating factors are known other than what is mentioned above.      6/30/17 Abdomen/Pelvis CT: No acute abnormalities. Colonic diverticulosis without diverticulitis. Hepatic steatosis. Status post cholecystectomy, hysterectomy and appendectomy. Prior to Admission medications    Medication Sig Start Date End Date Taking? Authorizing Provider   pantoprazole (PROTONIX) 40 mg tablet Take 1 Tab by mouth daily. 7/27/20   Khushbu Rodriguez MD   estradioL (ESTRACE) 1 mg tablet Take 1 Tab by mouth daily. 4/20/20   Khushbu Rodriguez MD   levothyroxine (SYNTHROID) 125 mcg tablet take 1 tablet by mouth once daily BEFORE BREAKFAST 11/11/19   Khushbu Rodriguez MD   cyanocobalamin (VITAMIN B-12) 1,000 mcg tablet Take 1,000 mcg by mouth daily.     Provider, Historical     Allergies   Allergen Reactions    Atorvastatin Unknown (comments) and Other (comments)     Myalgia    Myalgia    Septra [Sulfamethoprim Ds] Shortness of Breath and Rash    Sulfa (Sulfonamide Antibiotics) Hives, Shortness of Breath and Other (comments)     respiratory distress     Past Medical History:   Diagnosis Date    Allergic rhinitis     Asthma     Diverticulosis     DJD (degenerative joint disease)     DJD (degenerative joint disease)     GERD (gastroesophageal reflux disease)     Headache(784.0)     Hyperlipidemia     Hypothyroidism     Overweight(278.02)     with weight gain    Right knee pain      Past Surgical History:   Procedure Laterality Date    BREAST SURGERY PROCEDURE UNLISTED      reduction mammoplasty    ENDOSCOPY, COLON, DIAGNOSTIC      HX APPENDECTOMY      HX CHOLECYSTECTOMY      HX HYSTERECTOMY      HX KNEE ARTHROSCOPY      HX ORTHOPAEDIC      left knee    HX TONSILLECTOMY      HX TUBAL LIGATION       Family History   Problem Relation Age of Onset    Arthritis-osteo Mother     Heart Disease Mother     Diabetes Father     Cancer Father     Arthritis-osteo Brother     Diabetes Brother      Social History     Socioeconomic History    Marital status:      Spouse name: Not on file    Number of children: Not on file    Years of education: Not on file    Highest education level: Not on file   Tobacco Use    Smoking status: Never Smoker    Smokeless tobacco: Never Used   Substance and Sexual Activity    Alcohol use: No     Frequency: Never    Drug use: No       ROS:  Gen: No fever/chills  HEENT: No sore throat, eye pain, ear pain, or congestion.  No HA  CV: No CP  Resp: No cough/SOB  GI: See HPI  : No hematuria/dysuria  Derm: No rash  Neuro: No new paresthesias/weakness  Musc: No new myalgias/jt pain  Psych: No depression sx      Objective:     General: alert, cooperative, no distress   Mental  status: mental status: alert, oriented to person, place, and time, normal mood, behavior, speech, dress, motor activity, and thought processes   Resp: resp: normal effort and no respiratory distress   Neuro: neuro: no gross deficits   Skin: skin: no discoloration or lesions of concern on visible areas     LABS:  Lab Results   Component Value Date/Time    Sodium 142 07/15/2020 08:47 AM    Potassium 4.3 07/15/2020 08:47 AM    Chloride 108 07/15/2020 08:47 AM    CO2 28 07/15/2020 08:47 AM    Anion gap 6 07/15/2020 08:47 AM    Glucose 96 07/15/2020 08:47 AM    BUN 15 07/15/2020 08:47 AM    Creatinine 0.70 07/15/2020 08:47 AM    BUN/Creatinine ratio 21 (H) 07/15/2020 08:47 AM    GFR est AA >60 07/15/2020 08:47 AM    GFR est non-AA >60 07/15/2020 08:47 AM    Calcium 8.9 07/15/2020 08:47 AM       Lab Results   Component Value Date/Time    Cholesterol, total 250 (H) 07/15/2020 08:47 AM    HDL Cholesterol 77 (H) 07/15/2020 08:47 AM    LDL, calculated 138.6 (H) 07/15/2020 08:47 AM    VLDL, calculated 34.4 07/15/2020 08:47 AM    Triglyceride 172 (H) 07/15/2020 08:47 AM    CHOL/HDL Ratio 3.2 07/15/2020 08:47 AM       Lab Results   Component Value Date/Time    WBC 8.2 07/15/2020 08:47 AM    HGB 13.3 07/15/2020 08:47 AM    HCT 39.7 07/15/2020 08:47 AM    PLATELET 938 54/04/3975 08:47 AM    MCV 91.9 07/15/2020 08:47 AM       No results found for: HBA1C, HGBE8, [id number], VIQ8FTCI    Lab Results   Component Value Date/Time    TSH 0.56 07/15/2020 08:47 AM           Due to this being a TeleHealth  evaluation, many elements of the physical examination are unable to be assessed. The pt was seen by synchronous (real-time) audio-video technology, and/or her healthcare decision maker, is aware that this patient-initiated, Telehealth encounter is a billable service, with coverage as determined by her insurance carrier. She is aware that she may receive a bill and has provided verbal consent to proceed: Yes. The pt is being evaluated by a video visit encounter for concerns as above. A caregiver was present when appropriate. Due to this being a TeleHealth encounter (During OOP-41 public health emergency), evaluation of the following organ systems was limited: Vitals/Constitutional/EENT/Resp/CV/GI//MS/Neuro/Skin/Heme-Lymph-Imm. Pursuant to the emergency declaration under the [facility] waiver authority and the [employer] and [employer] Act, this Virtual  Visit was conducted, with patient's (and/or legal guardian's) consent, to reduce the patient's risk of exposure to COVID-19 and provide necessary medical care. Services were provided through a video synchronous discussion virtually to substitute for in-person clinic visit. Patient and provider were located at their individual homes. We discussed the expected course, resolution and complications of the diagnosis(es) in detail. Medication risks, benefits, costs, interactions, and alternatives were discussed as indicated. I advised her to contact the office if her condition worsens, changes or fails to improve as anticipated. She expressed understanding with the diagnosis(es) and plan.      Umer Cosby MD

## 2020-08-21 NOTE — TELEPHONE ENCOUNTER
Pt calling with diverticulitis flare which started Tuesday. Stated has history of diverticulitis and Dr Palmira Muro would usually send in the following medications:    Flagyl and Cipro. Stated having re-stools with cramping (has the medication Bentyl on hand for cramping and that helped    Now having localized discomfort on lower quadrant of abdomin. No fever at this time.      Pharmacy is [facility]    She is open to a virtual visit if requested

## 2020-11-06 RX ORDER — LEVOTHYROXINE SODIUM 125 UG/1
125 TABLET ORAL
Qty: 90 TAB | Refills: 3 | Status: SHIPPED | OUTPATIENT
Start: 2020-11-06 | End: 2021-07-28 | Stop reason: SDUPTHER

## 2020-11-06 NOTE — TELEPHONE ENCOUNTER
Last Visit: 8/21/2020 with MD Eleonora Dickerson  Next Appointment: 7/28/2021 with MD Eleonora Dickerson  Previous Refill Encounter(s): 11/11/2019 per MD Johnathan Valadez #[id number]    Requested Prescriptions     Pending Prescriptions Disp Refills    levothyroxine (SYNTHROID) 125 mcg tablet 90 Tab 3     Sig: Take 1 Tab by mouth Daily (before breakfast).

## 2020-12-31 ENCOUNTER — TELEPHONE (OUTPATIENT)
Dept: INTERNAL MEDICINE CLINIC | Age: 70
End: 2020-12-31

## 2020-12-31 DIAGNOSIS — Z20.822 CLOSE EXPOSURE TO 2019 NOVEL CORONAVIRUS: Primary | ICD-10-CM

## 2020-12-31 NOTE — TELEPHONE ENCOUNTER
Would recommend testing given close exposure. Please provide contact info for [facility] and order placed. Please advise need to quarantine for 14 days and to monitor closely for symptoms. Also please  patient regarding strict mitigation measures for COVID-19, including wearing a mask, social distancing and diligent hand washing, as recommended by the CDC.

## 2020-12-31 NOTE — TELEPHONE ENCOUNTER
Pt's brother was admitted to  last night, positive for Covid. She was around him on Shahnaz Day, in his house for about 1 hour. He fell sick 2-3 days ago and has cancer. She has no symptoms but wants to know if she should get tested.   Please advise her at [phone]

## 2021-01-04 ENCOUNTER — HOSPITAL ENCOUNTER (OUTPATIENT)
Dept: LAB | Age: 71
Discharge: HOME | End: 2021-01-04
Payer: MEDICARE

## 2021-01-04 ENCOUNTER — CLINICAL SUPPORT (OUTPATIENT)
Dept: FAMILY MEDICINE CLINIC | Age: 71
End: 2021-01-04

## 2021-01-04 DIAGNOSIS — Z20.828 EXPOSURE TO SARS-ASSOCIATED CORONAVIRUS: Primary | ICD-10-CM

## 2021-01-04 PROCEDURE — 87635 SARS-COV-2 COVID-19 AMP PRB: CPT

## 2021-01-07 LAB — SARS-COV-2, COV2NT: NOT DETECTED

## 2021-01-08 NOTE — TELEPHONE ENCOUNTER
1/7/2021  8:36 PM - [facility], Lab In [facility]    Component Value Flag Ref Range Units Status   SARS-CoV-2 Not Detected   Not Detected   Final   Comment:   (NOTE)   This nucleic acid amplification test was developed and its   performance characteristics determined by [facility]. Nucleic acid amplification tests include PCR and TMA. This test has   not been FDA cleared or approved. This test has been authorized by   FDA under an Emergency Use Authorization (EUA). This test is only   authorized for the duration of time the declaration that   circumstances exist justifying the authorization of the emergency use   of in vitro diagnostic tests for detection of SARS-CoV-2 virus and/or   diagnosis of COVID-19 infection under section 564(b)(1) of the Act,   21 U. S.C. 880WHH-7(P) (1), unless the authorization is terminated or   revoked sooner. When diagnostic testing is negative, the possibility of a false   negative result should be considered in the context of a patient's   recent exposures and the presence of clinical signs and symptoms   consistent with COVID-19. An individual without symptoms of COVID-   19 and who is not shedding SARS-CoV-2 virus would expect to have a   negative (not detected) result in this assay. Performed At: [address] [id number]   Laly Agrawal MD BC:[id number]          Please let the patient know that her COVID 19 test was negative. Please inquire as to how she is doing. She should continue to quarantine for 14 days from day of exposure and to monitor closely for symptoms. Also please  patient regarding strict mitigation measures for COVID-19, including wearing a mask, social distancing and diligent hand washing, as recommended by the CDC.

## 2021-01-08 NOTE — TELEPHONE ENCOUNTER
Reached patient and informed her of negative results and msg per Dr. Emma Wilson. Patient verbalized understanding.

## 2021-04-26 RX ORDER — ESTRADIOL 1 MG/1
1 TABLET ORAL DAILY
Qty: 90 TAB | Refills: 3 | Status: SHIPPED | OUTPATIENT
Start: 2021-04-26 | End: 2021-10-01

## 2021-04-26 NOTE — TELEPHONE ENCOUNTER
Last Visit: 8/21/20 with MD Ltanya Apley  Next Appointment: 7/28/21 with MD Ltanya Apley  Previous Refill Encounter(s): 4/20/20 #90 with 3 refills    Requested Prescriptions     Pending Prescriptions Disp Refills    estradioL (ESTRACE) 1 mg tablet 90 Tab 3     Sig: Take 1 Tab by mouth daily.

## 2021-07-21 ENCOUNTER — LAB ONLY (OUTPATIENT)
Dept: INTERNAL MEDICINE CLINIC | Age: 71
End: 2021-07-21

## 2021-07-21 ENCOUNTER — HOSPITAL ENCOUNTER (OUTPATIENT)
Dept: LAB | Age: 71
Discharge: HOME | End: 2021-07-21
Payer: MEDICARE

## 2021-07-21 DIAGNOSIS — E78.5 HYPERLIPIDEMIA LDL GOAL <130: ICD-10-CM

## 2021-07-21 DIAGNOSIS — K75.81 NASH (NONALCOHOLIC STEATOHEPATITIS): Primary | ICD-10-CM

## 2021-07-21 DIAGNOSIS — E06.3 HYPOTHYROIDISM DUE TO HASHIMOTO'S THYROIDITIS: ICD-10-CM

## 2021-07-21 DIAGNOSIS — R73.9 HYPERGLYCEMIA: ICD-10-CM

## 2021-07-21 DIAGNOSIS — K75.81 NASH (NONALCOHOLIC STEATOHEPATITIS): ICD-10-CM

## 2021-07-21 DIAGNOSIS — E03.8 HYPOTHYROIDISM DUE TO HASHIMOTO'S THYROIDITIS: ICD-10-CM

## 2021-07-21 DIAGNOSIS — E78.5 HYPERLIPIDEMIA LDL GOAL <130: Primary | ICD-10-CM

## 2021-07-21 DIAGNOSIS — E53.8 VITAMIN B12 DEFICIENCY: ICD-10-CM

## 2021-07-21 LAB
ALBUMIN SERPL-MCNC: 3.7 G/DL (ref 3.4–5)
ALBUMIN/GLOB SERPL: 1 {RATIO} (ref 0.8–1.7)
ALP SERPL-CCNC: 75 U/L (ref 45–117)
ALT SERPL-CCNC: 16 U/L (ref 13–56)
ANION GAP SERPL CALC-SCNC: 9 MMOL/L (ref 3–18)
AST SERPL-CCNC: 16 U/L (ref 10–38)
BASOPHILS # BLD: 0.1 K/UL (ref 0–0.1)
BASOPHILS NFR BLD: 1 % (ref 0–2)
BILIRUB SERPL-MCNC: 0.3 MG/DL (ref 0.2–1)
BUN SERPL-MCNC: 17 MG/DL (ref 7–18)
BUN/CREAT SERPL: 25 (ref 12–20)
CALCIUM SERPL-MCNC: 9.2 MG/DL (ref 8.5–10.1)
CHLORIDE SERPL-SCNC: 105 MMOL/L (ref 100–111)
CHOLEST SERPL-MCNC: 250 MG/DL
CO2 SERPL-SCNC: 27 MMOL/L (ref 21–32)
CREAT SERPL-MCNC: 0.67 MG/DL (ref 0.6–1.3)
DIFFERENTIAL METHOD BLD: NORMAL
EOSINOPHIL # BLD: 0.2 K/UL (ref 0–0.4)
EOSINOPHIL NFR BLD: 1 % (ref 0–5)
ERYTHROCYTE [DISTWIDTH] IN BLOOD BY AUTOMATED COUNT: 13.2 % (ref 11.6–14.5)
GLOBULIN SER CALC-MCNC: 3.7 G/DL (ref 2–4)
GLUCOSE SERPL-MCNC: 97 MG/DL (ref 74–99)
HCT VFR BLD AUTO: 41.2 % (ref 35–45)
HDLC SERPL-MCNC: 78 MG/DL (ref 40–60)
HDLC SERPL: 3.2 {RATIO} (ref 0–5)
HGB BLD-MCNC: 13.2 G/DL (ref 12–16)
LDLC SERPL CALC-MCNC: 138.2 MG/DL (ref 0–100)
LIPID PROFILE,FLP: ABNORMAL
LYMPHOCYTES # BLD: 2.8 K/UL (ref 0.9–3.6)
LYMPHOCYTES NFR BLD: 25 % (ref 21–52)
MCH RBC QN AUTO: 30.7 PG (ref 24–34)
MCHC RBC AUTO-ENTMCNC: 32 G/DL (ref 31–37)
MCV RBC AUTO: 95.8 FL (ref 74–97)
MONOCYTES # BLD: 0.9 K/UL (ref 0.05–1.2)
MONOCYTES NFR BLD: 8 % (ref 3–10)
NEUTS SEG # BLD: 7.3 K/UL (ref 1.8–8)
NEUTS SEG NFR BLD: 65 % (ref 40–73)
PLATELET # BLD AUTO: 343 K/UL (ref 135–420)
PMV BLD AUTO: 10.7 FL (ref 9.2–11.8)
POTASSIUM SERPL-SCNC: 4.2 MMOL/L (ref 3.5–5.5)
PROT SERPL-MCNC: 7.4 G/DL (ref 6.4–8.2)
RBC # BLD AUTO: 4.3 M/UL (ref 4.2–5.3)
SODIUM SERPL-SCNC: 141 MMOL/L (ref 136–145)
T4 FREE SERPL-MCNC: 1.2 NG/DL (ref 0.7–1.5)
TRIGL SERPL-MCNC: 169 MG/DL (ref ?–150)
TSH SERPL DL<=0.05 MIU/L-ACNC: 1.66 UIU/ML (ref 0.36–3.74)
VLDLC SERPL CALC-MCNC: 33.8 MG/DL
WBC # BLD AUTO: 11.3 K/UL (ref 4.6–13.2)

## 2021-07-21 PROCEDURE — 36415 COLL VENOUS BLD VENIPUNCTURE: CPT

## 2021-07-21 PROCEDURE — 84443 ASSAY THYROID STIM HORMONE: CPT

## 2021-07-21 PROCEDURE — 84439 ASSAY OF FREE THYROXINE: CPT

## 2021-07-21 PROCEDURE — 85025 COMPLETE CBC W/AUTO DIFF WBC: CPT

## 2021-07-21 PROCEDURE — 80061 LIPID PANEL: CPT

## 2021-07-21 PROCEDURE — 80053 COMPREHEN METABOLIC PANEL: CPT

## 2021-07-28 ENCOUNTER — HOSPITAL ENCOUNTER (OUTPATIENT)
Dept: LAB | Age: 71
Discharge: HOME | End: 2021-07-28
Payer: MEDICARE

## 2021-07-28 ENCOUNTER — OFFICE VISIT (OUTPATIENT)
Dept: INTERNAL MEDICINE CLINIC | Age: 71
End: 2021-07-28
Payer: MEDICARE

## 2021-07-28 VITALS
DIASTOLIC BLOOD PRESSURE: 95 MMHG | HEART RATE: 78 BPM | OXYGEN SATURATION: 96 % | WEIGHT: 172 LBS | HEIGHT: 60 IN | BODY MASS INDEX: 33.77 KG/M2 | SYSTOLIC BLOOD PRESSURE: 146 MMHG | RESPIRATION RATE: 16 BRPM | TEMPERATURE: 97.4 F

## 2021-07-28 DIAGNOSIS — N95.1 MENOPAUSAL SYMPTOMS: ICD-10-CM

## 2021-07-28 DIAGNOSIS — Z71.89 ADVANCE CARE PLANNING: ICD-10-CM

## 2021-07-28 DIAGNOSIS — J45.909 UNCOMPLICATED ASTHMA, UNSPECIFIED ASTHMA SEVERITY, UNSPECIFIED WHETHER PERSISTENT: ICD-10-CM

## 2021-07-28 DIAGNOSIS — E06.3 HYPOTHYROIDISM DUE TO HASHIMOTO'S THYROIDITIS: ICD-10-CM

## 2021-07-28 DIAGNOSIS — Z78.0 POSTMENOPAUSAL: ICD-10-CM

## 2021-07-28 DIAGNOSIS — M25.519 CHRONIC SHOULDER PAIN, UNSPECIFIED LATERALITY: ICD-10-CM

## 2021-07-28 DIAGNOSIS — M25.50 ARTHRALGIA, UNSPECIFIED JOINT: Primary | ICD-10-CM

## 2021-07-28 DIAGNOSIS — Z12.31 ENCOUNTER FOR SCREENING MAMMOGRAM FOR BREAST CANCER: ICD-10-CM

## 2021-07-28 DIAGNOSIS — M25.50 ARTHRALGIA, UNSPECIFIED JOINT: ICD-10-CM

## 2021-07-28 DIAGNOSIS — G89.29 CHRONIC SHOULDER PAIN, UNSPECIFIED LATERALITY: ICD-10-CM

## 2021-07-28 DIAGNOSIS — F43.9 STRESS: ICD-10-CM

## 2021-07-28 DIAGNOSIS — Z00.00 MEDICARE ANNUAL WELLNESS VISIT, SUBSEQUENT: ICD-10-CM

## 2021-07-28 DIAGNOSIS — E03.8 HYPOTHYROIDISM DUE TO HASHIMOTO'S THYROIDITIS: ICD-10-CM

## 2021-07-28 PROCEDURE — 99214 OFFICE O/P EST MOD 30 MIN: CPT | Performed by: INTERNAL MEDICINE

## 2021-07-28 PROCEDURE — 86200 CCP ANTIBODY: CPT

## 2021-07-28 PROCEDURE — G8399 PT W/DXA RESULTS DOCUMENT: HCPCS | Performed by: INTERNAL MEDICINE

## 2021-07-28 PROCEDURE — G8417 CALC BMI ABV UP PARAM F/U: HCPCS | Performed by: INTERNAL MEDICINE

## 2021-07-28 PROCEDURE — G8510 SCR DEP NEG, NO PLAN REQD: HCPCS | Performed by: INTERNAL MEDICINE

## 2021-07-28 PROCEDURE — 86140 C-REACTIVE PROTEIN: CPT

## 2021-07-28 PROCEDURE — 1101F PT FALLS ASSESS-DOCD LE1/YR: CPT | Performed by: INTERNAL MEDICINE

## 2021-07-28 PROCEDURE — 36415 COLL VENOUS BLD VENIPUNCTURE: CPT

## 2021-07-28 PROCEDURE — G8536 NO DOC ELDER MAL SCRN: HCPCS | Performed by: INTERNAL MEDICINE

## 2021-07-28 PROCEDURE — 1090F PRES/ABSN URINE INCON ASSESS: CPT | Performed by: INTERNAL MEDICINE

## 2021-07-28 PROCEDURE — G0439 PPPS, SUBSEQ VISIT: HCPCS | Performed by: INTERNAL MEDICINE

## 2021-07-28 PROCEDURE — G0463 HOSPITAL OUTPT CLINIC VISIT: HCPCS | Performed by: INTERNAL MEDICINE

## 2021-07-28 PROCEDURE — G8427 DOCREV CUR MEDS BY ELIG CLIN: HCPCS | Performed by: INTERNAL MEDICINE

## 2021-07-28 PROCEDURE — 3017F COLORECTAL CA SCREEN DOC REV: CPT | Performed by: INTERNAL MEDICINE

## 2021-07-28 PROCEDURE — G9899 SCRN MAM PERF RSLTS DOC: HCPCS | Performed by: INTERNAL MEDICINE

## 2021-07-28 RX ORDER — LEVOTHYROXINE SODIUM 125 UG/1
125 TABLET ORAL
Qty: 90 TABLET | Refills: 3 | Status: SHIPPED | OUTPATIENT
Start: 2021-07-28 | End: 2022-07-29 | Stop reason: SDUPTHER

## 2021-07-28 NOTE — PATIENT INSTRUCTIONS
Medicare Wellness Visit, Female     The best way to live healthy is to have a lifestyle where you eat a well-balanced diet, exercise regularly, limit alcohol use, and quit all forms of tobacco/nicotine, if applicable. Regular preventive services are another way to keep healthy. Preventive services (vaccines, screening tests, monitoring & exams) can help personalize your care plan, which helps you manage your own care. Screening tests can find health problems at the earliest stages, when they are easiest to treat. [facility] follows the current, evidence-based guidelines published by the [facility] ([facility]) when recommending preventive services for our patients. Because we follow these guidelines, sometimes recommendations change over time as research supports it. (For example, mammograms used to be recommended annually. Even though Medicare will still pay for an annual mammogram, the newer guidelines recommend a mammogram every two years for women of average risk). Of course, you and your doctor may decide to screen more often for some diseases, based on your risk and your co-morbidities (chronic disease you are already diagnosed with). Preventive services for you include:  - Medicare offers their members a free annual wellness visit, which is time for you and your primary care provider to discuss and plan for your preventive service needs. Take advantage of this benefit every year!  -All adults over the age of 72 should receive the recommended pneumonia vaccines. Current USPSTF guidelines recommend a series of two vaccines for the best pneumonia protection.   -All adults should have a flu vaccine yearly and a tetanus vaccine every 10 years.   -All adults age 48 and older should receive the shingles vaccines (series of two vaccines).       -All adults age 38-68 who are overweight should have a diabetes screening test once every three years.   -All adults born between 80 and 1965 should be screened once for Hepatitis C.  -Other screening tests and preventive services for persons with diabetes include: an eye exam to screen for diabetic retinopathy, a kidney function test, a foot exam, and stricter control over your cholesterol.   -Cardiovascular screening for adults with routine risk involves an electrocardiogram (ECG) at intervals determined by your doctor.   -Colorectal cancer screenings should be done for adults age 54-65 with no increased risk factors for colorectal cancer. There are a number of acceptable methods of screening for this type of cancer. Each test has its own benefits and drawbacks. Discuss with your doctor what is most appropriate for you during your annual wellness visit. The different tests include: colonoscopy (considered the best screening method), a fecal occult blood test, a fecal DNA test, and sigmoidoscopy.    -A bone mass density test is recommended when a woman turns 65 to screen for osteoporosis. This test is only recommended one time, as a screening. Some providers will use this same test as a disease monitoring tool if you already have osteoporosis. -Breast cancer screenings are recommended every other year for women of normal risk, age 54-69.  -Cervical cancer screenings for women over age 72 are only recommended with certain risk factors. Here is a list of your current Health Maintenance items (your personalized list of preventive services) with a due date:  Health Maintenance Due   Topic Date Due    Shingles Vaccine (1 of 2) Never done    DTaP/Tdap/Td  (1 - Tdap) 04/22/2011    Annual Well Visit  01/24/2020         Medicare Part B Preventive Services Limitations Recommendation Scheduled   Bone Mass Measurement  (age 72 & older, biennial) Requires diagnosis related to osteoporosis or estrogen deficiency.  Biennial benefit unless patient has history of long-term glucocorticoid tx or baseline is needed because initial test was by other method This should be done at age 72 and again if on osteoporosis medication at 2-3 year intervals. Scan ordered   Cardiovascular Screening Blood Tests (every 5 years)  Total cholesterol, HDL, Triglycerides Order as a panel if possible We should check your cholesterol panel at least once every 5 years. Up to date   Colorectal Cancer Screening  -Fecal occult blood test (annual)  -Flexible sigmoidoscopy (5y)  -Screening colonoscopy (10y)  -Barium Enema  Due per your Gastroenterologist's recommendations. Up to date   Counseling to Prevent Tobacco Use (up to 8 sessions per year)  - Counseling greater than 3 and up to 10 minutes  - Counseling greater than 10 minutes Patients must be asymptomatic of tobacco-related conditions to receive as preventive service Continue with smoking cessation Not applicable   Diabetes Screening Tests (at least every 3 years, Medicare covers annually or at 6-month intervals for prediabetic patients)    Fasting blood sugar (FBS) or glucose tolerance test (GTT) Patient must be diagnosed with one of the following:  -Hypertension, Dyslipidemia, obesity, previous impaired FBS or GTT  Or any two of the following: overweight, FH of diabetes, age ? 72, history of gestational diabetes, birth of baby weighing more than 9 pounds Should be done yearly Up to date   Diabetes Self-Management Training (DSMT) (no USPSTF recommendation) Requires referral by treating physician for patient with diabetes or renal disease. 10 hours of initial DSMT session of no less than 30 minutes each in a continuous 12-month period. 2 hours of follow-up DSMT in subsequent years. Not applicable Not applicable   Glaucoma Screening (no USPSTF recommendation) Diabetes mellitus, family history, , age 48 or over,  American, age 72 or over Continue with annual eye exams.  Up to date   Human Immunodeficiency Virus (HIV) Screening (annually for increased risk patients)  HIV-1 and HIV-2 by EIA, KENDRICK, rapid antibody test, or oral mucosa transudate Patient must be at increased risk for HIV infection per USPSTF guidelines or pregnant. Tests covered annually for patients at increased risk. Pregnant patients may receive up to 3 test during pregnancy. Not applicable Not applicable   Medical Nutrition Therapy (MNT) (for diabetes or renal disease not recommended schedule) Requires referral by treating physician for patient with diabetes or renal disease. Can be provided in same year as diabetes self-management training (DSMT), and CMS recommends medical nutrition therapy take place after DSMT. Up to 3 hours for initial year and 2 hours in subsequent years. Not applicable Not applicable   Shingles Vaccination  Vaccination recommended for shingles vaccination. Overdue per our records   Seasonal Influenza Vaccination (annually)  Continue with yearly \"flu\" shot annually Due later this year   Pneumococcal Vaccination (once after 65)  Please receive this vaccination at age 72. Up to date   Hepatitis B Vaccinations (if medium/high risk) Medium/high risk factors:  End-stage renal disease,  Hemophiliacs who received Factor VIII or IX concentrates, Clients of institutions for the mentally retarded, Persons who live in the same house as a HepB virus carrier, Homosexual men, Illicit injectable drug abusers. Not applicable Not applicable   Screening Mammography (biennial age 54-69) Annually (age 36 or over) You need a mammogram yearly to screen for breast cancer. Up to date   Screening Pap Tests and Pelvic Examination (up to age 79 and after 79 if unknown history or abnormal study last 10 years) Every 24 months except high risk You need no Pap smear at this time. Not warranted at this time. Ultrasound Screening for Abdominal Aortic Aneurysm (AAA) (once) Patient must be referred through IPPE and not have had a screening for abdominal aortic aneurysm before under Medicare.   Limited to patients who meet one of the following criteria:  - Men who are 73-68 years old and have smoked more than 100 cigarettes in their lifetime.  -Anyone with a FH of AAA  -Anyone recommended for screening by USPSTF Not applicable Not applicable

## 2021-07-28 NOTE — PROGRESS NOTES
1. Have you been to the ER, urgent care clinic since your last visit? Hospitalized since your last visit? No    2. Have you seen or consulted any other health care providers outside of the [address] [place] [address] since your last visit? Include any pap smears or colon screening. Dr. Elmo Barillas 7/26/21.     Chief Complaint   Patient presents with   [facility] Wellness Visit

## 2021-07-28 NOTE — PROGRESS NOTES
[facility]:  7/28/2021, MRN: [id number]      Diana Ac is a 80 y.o. female and presents to clinic for Annual Wellness Visit    Subjective: The pt is a 79 yo female with h/o hypothyroidism, NAFLD, B12 deficiency, GERD, diverticulitis per EHR, s/p abx from left knee infection after a knee replacement surgery, OA, asthma, and allergic rhinitis. 1. HA and Shoulder Pain: S/p left shoulder pain injection. She always gets HAs after a shoulder jt injection. She takes aleve twice a day for sx relief of her shoulder pain. Followed by Alana Thomas. She is starting PT soon. She was told that she likely has OA. She occasionally gets pain along her right middle finger MCP jt. Her grandmother had RA. She was tested for RA yrs ago and screened negative per her hx. Her BP is falsely high. +H/o white coat HTN. 2. NAFLD: ETOH intake: no excessive intake. She has normal LFTs per recent labs. She walks her dog every day    3. Asthma: Use of albuterol: she has not used this inhaler this yr.     4. Hypothyroidism: On Synthroid 125mcg daily. TFTs are WNL per recent labs. 5. Stress: She lives with her boyfriend for 20 yrs. He was diagnosed with cancer (lung) and is getting treatment. Her brother was diagnosed with MM. +Jainism. She is a retired nurse. 6. Menopausal Sx: She has been unsuccessful with transitioning off of estrace pills. She wants to try to come off of this rx. It is expensive. She has hot flashes when she does not take it. No relief with premarin topical rx.        Patient Active Problem List    Diagnosis Date Noted    DYER (nonalcoholic steatohepatitis) 08/21/2020    Menopausal symptoms 08/21/2020    History of non anemic vitamin B12 deficiency 08/21/2020    Lumbar facet arthropathy 08/21/2020    Vitamin B12 deficiency 08/14/2017    Gastroesophageal reflux disease without esophagitis 02/14/2017    Hypothyroidism due to Hashimoto's thyroiditis 02/14/2017    History of diverticulitis 10/13/2016    Hyperlipidemia LDL goal <130 05/11/2016    Right knee pain     Arthritis of left knee 04/29/2015    Venous insufficiency 09/09/2013    Allergic rhinitis     Asthma        Current Outpatient Medications   Medication Sig Dispense Refill    estradioL (ESTRACE) 1 mg tablet Take 1 Tab by mouth daily. 90 Tab 3    levothyroxine (SYNTHROID) 125 mcg tablet Take 1 Tab by mouth Daily (before breakfast). 90 Tab 3    pantoprazole (PROTONIX) 40 mg tablet Take 1 Tab by mouth daily. (Patient taking differently: Take 40 mg by mouth two (2) times a day.) 90 Tab 3    cyanocobalamin (VITAMIN B-12) 1,000 mcg tablet Take 1,000 mcg by mouth daily.  ciprofloxacin HCl (CIPRO) 500 mg tablet Take 1 Tab by mouth two (2) times a day. (Patient not taking: Reported on 7/28/2021) 20 Tab 0    metroNIDAZOLE (FlagyL) 500 mg tablet Take 1 Tab by mouth three (3) times daily. (Patient not taking: Reported on 7/28/2021) 30 Tab 0       Allergies   Allergen Reactions    Atorvastatin Unknown (comments) and Other (comments)     Myalgia    Myalgia    Septra [Sulfamethoprim Ds] Shortness of Breath and Rash    Sulfa (Sulfonamide Antibiotics) Hives, Shortness of Breath and Other (comments)     respiratory distress    Oxycodone Itching and Other (comments)     Itching and difficulty swallowing \"my tongue felt real thick\".        Past Medical History:   Diagnosis Date    Allergic rhinitis     Asthma     Diverticulosis     DJD (degenerative joint disease)     DJD (degenerative joint disease)     GERD (gastroesophageal reflux disease)     Headache(784.0)     Hyperlipidemia     Hypothyroidism     Overweight(278.02)     with weight gain    Right knee pain        Past Surgical History:   Procedure Laterality Date    ENDOSCOPY, COLON, DIAGNOSTIC      HX APPENDECTOMY      HX CHOLECYSTECTOMY      HX HYSTERECTOMY      HX KNEE ARTHROSCOPY      HX ORTHOPAEDIC      left knee    HX TONSILLECTOMY      HX TUBAL LIGATION      OH BREAST SURGERY PROCEDURE UNLISTED      reduction mammoplasty       Family History   Problem Relation Age of Onset    Arthritis-osteo Mother     Heart Disease Mother     Diabetes Father     Cancer Father     Arthritis-osteo Brother     Diabetes Brother        Social History     Tobacco Use    Smoking status: Never Smoker    Smokeless tobacco: Never Used   Substance Use Topics    Alcohol use: No       ROS   Review of Systems   Constitutional: Negative for chills and fever. HENT: Negative for ear pain and sore throat. Eyes: Negative for blurred vision and pain. Respiratory: Negative for cough and shortness of breath. Cardiovascular: Negative for chest pain. Gastrointestinal: Negative for abdominal pain, blood in stool and melena. Genitourinary: Negative for dysuria and hematuria. Musculoskeletal: Positive for back pain, joint pain and neck pain. Negative for myalgias. Skin: Negative for rash. Neurological: Positive for headaches. Negative for tingling and focal weakness. Endo/Heme/Allergies: Does not bruise/bleed easily. Psychiatric/Behavioral: Negative for depression and substance abuse. Objective     Vitals:    07/28/21 0903   BP: (!) 146/95   Pulse: 78   Resp: 16   Temp: 97.4 °F (36.3 °C)   TempSrc: Temporal   SpO2: 96%   Weight: 172 lb (78 kg)   Height: 4' 11.84\" (1.52 m)   PainSc:   4   PainLoc: Head       Physical Exam  Vitals and nursing note reviewed. HENT:      Head: Normocephalic and atraumatic. Right Ear: External ear normal.      Left Ear: External ear normal.   Eyes:      General: No scleral icterus. Right eye: No discharge. Left eye: No discharge. Conjunctiva/sclera: Conjunctivae normal.   Cardiovascular:      Rate and Rhythm: Normal rate and regular rhythm. Heart sounds: Normal heart sounds. No murmur heard. No friction rub. No gallop. Pulmonary:      Effort: Pulmonary effort is normal. No respiratory distress. Breath sounds: Normal breath sounds. No wheezing or rales. Abdominal:      General: Bowel sounds are normal. There is no distension. Palpations: Abdomen is soft. There is no mass. Tenderness: There is no abdominal tenderness. There is no guarding or rebound. Musculoskeletal:         General: No swelling or tenderness (BUE except along her right middle finger MCP jt). Cervical back: Neck supple. Lymphadenopathy:      Cervical: No cervical adenopathy. Skin:     General: Skin is warm and dry. Findings: No erythema. Neurological:      Mental Status: She is alert and oriented to person, place, and time. Motor: No abnormal muscle tone. Gait: Gait is intact. Gait normal.   Psychiatric:         Mood and Affect: Mood and affect normal.         LABS   Data Review:   Lab Results   Component Value Date/Time    WBC 11.3 07/21/2021 10:15 AM    HGB 13.2 07/21/2021 10:15 AM    HCT 41.2 07/21/2021 10:15 AM    PLATELET 322 10/93/6663 10:15 AM    MCV 95.8 07/21/2021 10:15 AM       Lab Results   Component Value Date/Time    Sodium 141 07/21/2021 10:15 AM    Potassium 4.2 07/21/2021 10:15 AM    Chloride 105 07/21/2021 10:15 AM    CO2 27 07/21/2021 10:15 AM    Anion gap 9 07/21/2021 10:15 AM    Glucose 97 07/21/2021 10:15 AM    BUN 17 07/21/2021 10:15 AM    Creatinine 0.67 07/21/2021 10:15 AM    BUN/Creatinine ratio 25 (H) 07/21/2021 10:15 AM    GFR est AA >60 07/21/2021 10:15 AM    GFR est non-AA >60 07/21/2021 10:15 AM    Calcium 9.2 07/21/2021 10:15 AM       Lab Results   Component Value Date/Time    Cholesterol, total 250 (H) 07/21/2021 10:15 AM    HDL Cholesterol 78 (H) 07/21/2021 10:15 AM    LDL, calculated 138.2 (H) 07/21/2021 10:15 AM    VLDL, calculated 33.8 07/21/2021 10:15 AM    Triglyceride 169 (H) 07/21/2021 10:15 AM    CHOL/HDL Ratio 3.2 07/21/2021 10:15 AM       No results found for: HBA1C, GUB8NMMK, KHN6FVRS, DEE7LFSR    Assessment/Plan:   1. NAFLD: +HLD.   - We will recheck her labs next yr    2. Hypothyroidism:  - C/w rx as prescribed. - Rechecking her labs in a year    3. Arthralgias:   - Checking labs to r/o inflammatory arthritis. - Activity as tolerated  - F/u with Orthopedics    4. HAs:  - Let me know if sx do not resolve over the next wk    5. Asthma Hx: Stable. - Ok to use albuterol prn    6. Stress:  - I encouraged her to notify me if stress worsens. 7. Menopausal Sx:  - I encouraged her to transition off of estrace if possible      Health Maintenance Due   Topic Date Due    Shingrix Vaccine Age 49> (1 of 2) Never done    DTaP/Tdap/Td series (1 - Tdap) 04/22/2011    Medicare Yearly Exam  01/24/2020     Lab review: labs are reviewed in the EHR and ordered as mentioned above    I have discussed the diagnosis with the patient and the intended plan as seen in the above orders. The patient has received an after-visit summary and questions were answered concerning future plans. I have discussed medication side effects and warnings with the patient as well. I have reviewed the plan of care with the patient, accepted their input and they are in agreement with the treatment goals. All questions were answered. The patient understands the plan of care. Handouts provided today with above information. Pt instructed if symptoms worsen to call the office or report to the ED for continued care. Greater than 50% of the visit time was spent in counseling and/or coordination of care. Voice recognition was used to generate this report, which may have resulted in some phonetic based errors in grammar and contents. Even though attempts were made to correct all the mistakes, some may have been missed, and remained in the body of the document.           Shanon Perdue MD

## 2021-07-29 LAB — CRP SERPL-MCNC: 1 MG/DL (ref 0–0.3)

## 2021-08-02 LAB
14.3.3 ETA, RHEUM. ARTHRITIS: <0.2 NG/ML
CCP IGA+IGG SERPL IA-ACNC: <20 UNITS
RHEUMATOID FACT SERPL-ACNC: <14 UNITS/ML

## 2021-08-08 DIAGNOSIS — M25.569 ARTHRALGIA OF KNEE, UNSPECIFIED LATERALITY: ICD-10-CM

## 2021-08-08 DIAGNOSIS — R79.82 ELEVATED C-REACTIVE PROTEIN (CRP): Primary | ICD-10-CM

## 2021-08-08 NOTE — PROGRESS NOTES
Please let her know that her labs do not show any evidence of rheumatoid arthritis but her inflammatory marker is elevated. We need to trend this inflammatory marker. Please have her scheduled in early September for a follow-up CRP inflammatory marker to be checked. If her inflammatory marker continues to be elevated, it may indicate that she has some type of underlying infection or inflammatory arthritis that is present, despite her unremarkable rheumatoid arthritis lab result.      Dr. Mary Macias  [facility] [address], [address] [place] [address] Str.  Phone: [phone]  Fax: [phone]

## 2021-08-08 NOTE — ACP (ADVANCE CARE PLANNING)
Advance Care Planning  Advance Care Planning (ACP) Provider Conversation     Date of ACP Conversation: 7/28/21  Persons included in Conversation:  patient    Authorized Decision Maker (if patient is incapable of making informed decisions): This person is:   Next of Kin by law (only applies in absence of a Healthcare Power of  or Legal Guardian)          For Patients with Decision Making Capacity:   Values/Goals: Exploration of values, goals, and preferences if recovery is not expected, even with continued medical treatment in the event of:  Imminent death  Severe, permanent brain injury    Conversation Outcomes / Follow-Up Plan:   ACP in process - information provided, considering goals and options. I encouraged her to complete her advance directive.      Dr. Arabella Quiles  [facility] [address], [facility]  Phone: [phone]  Fax: [phone]

## 2021-08-08 NOTE — PROGRESS NOTES
INTERNISTS OF [facility]:  08/08/21, [id number]      The Subsequent Medicare Annual Wellness Exam PROGRESS NOTE    This is a Subsequent Medicare Annual Wellness Exam (AWV). I have reviewed the patient's medical history in detail and updated the computerized patient record. Renee Arango is a 80 y.o.  female and presents for an annual wellness exam.    SUBJECTIVE    Past Medical History:   Diagnosis Date    Allergic rhinitis     Asthma     Diverticulosis     DJD (degenerative joint disease)     DJD (degenerative joint disease)     GERD (gastroesophageal reflux disease)     Headache(784.0)     Hyperlipidemia     Hypothyroidism     Overweight(278.02)     with weight gain    Right knee pain       Past Surgical History:   Procedure Laterality Date    ENDOSCOPY, COLON, DIAGNOSTIC      HX APPENDECTOMY      HX CHOLECYSTECTOMY      HX HYSTERECTOMY      HX KNEE ARTHROSCOPY      HX ORTHOPAEDIC      left knee    HX TONSILLECTOMY      HX TUBAL LIGATION      VT BREAST SURGERY PROCEDURE UNLISTED      reduction mammoplasty     Current Outpatient Medications   Medication Sig Dispense Refill    levothyroxine (SYNTHROID) 125 mcg tablet Take 1 Tablet by mouth Daily (before breakfast). 90 Tablet 3    estradioL (ESTRACE) 1 mg tablet Take 1 Tab by mouth daily. 90 Tab 3    cyanocobalamin (VITAMIN B-12) 1,000 mcg tablet Take 1,000 mcg by mouth daily.  metroNIDAZOLE (FlagyL) 500 mg tablet Take 1 Tab by mouth three (3) times daily. (Patient not taking: Reported on 7/28/2021) 30 Tab 0     Allergies   Allergen Reactions    Atorvastatin Unknown (comments) and Other (comments)     Myalgia    Myalgia    Septra [Sulfamethoprim Ds] Shortness of Breath and Rash    Sulfa (Sulfonamide Antibiotics) Hives, Shortness of Breath and Other (comments)     respiratory distress    Oxycodone Itching and Other (comments)     Itching and difficulty swallowing \"my tongue felt real thick\".      Family History Problem Relation Age of Onset    Arthritis-osteo Mother     Heart Disease Mother     Diabetes Father     Cancer Father     Arthritis-osteo Brother     Diabetes Brother      Social History     Tobacco Use    Smoking status: Never Smoker    Smokeless tobacco: Never Used   Substance Use Topics    Alcohol use: No     Patient Active Problem List   Diagnosis Code    Allergic rhinitis J30.9    Asthma J45.909    Venous insufficiency I87.2    Arthritis of left knee M17.12    Right knee pain M25.561    Hyperlipidemia LDL goal <130 E78.5    History of diverticulitis Z87.19    Gastroesophageal reflux disease without esophagitis K21.9    Hypothyroidism due to Hashimoto's thyroiditis E03.8, E06.3    Vitamin B12 deficiency E53.8    DYER (nonalcoholic steatohepatitis) K75.81    Menopausal symptoms N95.1    History of non anemic vitamin B12 deficiency Z86.39    Lumbar facet arthropathy M47.816     The pt is a 79 yo female with h/o hypothyroidism, NAFLD, B12 deficiency, GERD, diverticulitis per EHR, s/p abx from left knee infection after a knee replacement surgery, OA, asthma, and allergic rhinitis. Health Maintenance History  Immunizations reviewed:    Tdap over-due   Pneumovax: up to date   Flu: due later this year  Zoster: over-due    Immunization History   Administered Date(s) Administered    COVID-19, PFIZER, MRNA, LNP-S, PF, 30MCG/0.3ML DOSE 03/11/2021, 04/03/2021    Influenza High Dose Vaccine PF 10/04/2017, 10/07/2018, 09/02/2020    Influenza Vaccine 11/23/2016, 10/04/2018    Influenza Vaccine (Quad) PF (>6 Mo Flulaval, Fluarix, and >3 Yrs Afluria, Fluzone 02013) 11/09/2015, 11/11/2016    Influenza Vaccine (Tri) Adjuvanted (>65 Yrs FLUAD TRI 79816) 10/07/2018, 09/30/2019    Influenza Vaccine PF 10/24/2013, 10/29/2014    Influenza Vaccine Split 10/11/2011, 10/19/2012    Influenza Vaccine Whole 09/24/2010    Pneumococcal Conjugate (PCV-13) 11/11/2016, 09/30/2019    Pneumococcal Polysaccharide (PPSV-23) 11/23/2016, 04/05/2018    TD Vaccine 04/21/2011    Zoster Vaccine, Live 10/24/2013       Colonoscopy: Up to date. No bleeding. Eye exam: Up to date    Mammo: Overdue    Dexascan: Overdue    Pelvic/Pap: No bleeding      Review of Systems   Constitutional: Negative for chills and fever. HENT: Negative for ear pain and sore throat. Eyes: Negative for blurred vision and pain. Respiratory: Negative for cough and shortness of breath. Cardiovascular: Negative for chest pain. Gastrointestinal: Negative for abdominal pain, blood in stool and melena. Genitourinary: Negative for dysuria and hematuria. Musculoskeletal: Positive for back pain, joint pain and neck pain. Negative for myalgias. Skin: Negative for rash. Neurological: Positive for headaches. Endo/Heme/Allergies: Does not bruise/bleed easily. Psychiatric/Behavioral: Negative for substance abuse. Depression Risk Factor Screening:      Patient Health Questionnaire (PHQ-2)   Over the last 2 weeks, how often have you been bothered by any of the following problems? · Little interest or pleasure in doing things? · Not at all. [0]  · Feeling down, depressed, or hopeless? · Not at all. [0]    Total Score: 0/6  PHQ-2 Assessment Scoring:   A score of 2 or more requires further screening with the PHQ-9    Alcohol Risk Factor Screening:   Women: On any occasion during the past 3 months, have you had more than 3 drinks containing alcohol? no    Do you average more than 7 drinks per week? no    Tobacco Use Screening:     Social History     Tobacco Use   Smoking Status Never Smoker   Smokeless Tobacco Never Used       Hearing Loss    She does not need any additional hearing evaluation per her hx today    Activities of Daily Living   Self-care. Requires assistance with: no ADLs  She does not need help with ADLs/IADLs at this time.     Fall Risk   no falls w/i the past year    Abuse Screen   None    Additional Examination Findings:  Vitals:    07/28/21 0903   BP: (!) 146/95   Pulse: 78   Resp: 16   Temp: 97.4 °F (36.3 °C)   TempSrc: Temporal   SpO2: 96%   Weight: 172 lb (78 kg)   Height: 4' 11.84\" (1.52 m)   PainSc:   4   PainLoc: Head      Body mass index is 33.77 kg/m². Evaluation of Cognitive Function:  Mood/affect: Euthymic  Appearance: Well-groomed      General:   Well-nourished, well-groomed, pleasant, alert, in no acute distress. Head:  Normocephalic, atraumatic  Ears:  External ears WNL  Eyes:  Clear sclera  Neuro:   Alert, conversant, appropriate, following commands  Skin:    No rashes noted  Psych: Affect, mood and judgment appropriate      Dementia Screen:  Clock Drawing (ten past eleven) Exercise: Unremarkable.        LABS   Data Review:   Lab Results   Component Value Date/Time    Sodium 141 07/21/2021 10:15 AM    Potassium 4.2 07/21/2021 10:15 AM    Chloride 105 07/21/2021 10:15 AM    CO2 27 07/21/2021 10:15 AM    Anion gap 9 07/21/2021 10:15 AM    Glucose 97 07/21/2021 10:15 AM    BUN 17 07/21/2021 10:15 AM    Creatinine 0.67 07/21/2021 10:15 AM    BUN/Creatinine ratio 25 (H) 07/21/2021 10:15 AM    GFR est AA >60 07/21/2021 10:15 AM    GFR est non-AA >60 07/21/2021 10:15 AM    Calcium 9.2 07/21/2021 10:15 AM       Lab Results   Component Value Date/Time    WBC 11.3 07/21/2021 10:15 AM    HGB 13.2 07/21/2021 10:15 AM    HCT 41.2 07/21/2021 10:15 AM    PLATELET 629 73/02/0767 10:15 AM    MCV 95.8 07/21/2021 10:15 AM       No results found for: HBA1C, TKK7VUDU, OOU5YFDS    Lab Results   Component Value Date/Time    Cholesterol, total 250 (H) 07/21/2021 10:15 AM    HDL Cholesterol 78 (H) 07/21/2021 10:15 AM    LDL, calculated 138.2 (H) 07/21/2021 10:15 AM    VLDL, calculated 33.8 07/21/2021 10:15 AM    Triglyceride 169 (H) 07/21/2021 10:15 AM    CHOL/HDL Ratio 3.2 07/21/2021 10:15 AM       Patient Care Team:  Nirmala Healy MD as PCP - General (Family Medicine)  Nirmala Healy MD as PCP - [address] [place] [address]  Empaneled Provider  Jean Yee MD (Ophthalmology)  Perla Hancock MD (Ophthalmology)  Len Callahan MD (Orthopedic Surgery)    End-of-life planning  Advanced Directive in the case than an injury or illness causes the patient to be unable to make health care decisions was discussed with the patient. Advice/Referrals/Counselling/Plan:   Education and counseling provided:  Are appropriate based on today's review and evaluation  End-of-Life planning (with patient's consent)  Pneumococcal Vaccine  Influenza Vaccine  Screening Mammography  Screening Pap and pelvic (covered once every 2 years)  Colorectal cancer screening tests  Cardiovascular screening blood test  Bone mass measurement (DEXA)  Screening for glaucoma  Diabetes screening test  Include in education list (weight loss, physical activity, smoking cessation, fall prevention, and nutrition)    ICD-10-CM ICD-9-CM    1. Medicare annual wellness visit, subsequent  Z00.00 V70.0    2. Postmenopausal  Z78.0 V49.81 DEXA BONE DENSITY STUDY AXIAL   3. Encounter for screening mammogram for breast cancer  Z12.31 V76.12 [facility] 3D MILEY W MAMMO BI SCREENING INCL CAD   4. Arthralgia, unspecified joint  M25.50 719.40 C REACTIVE PROTEIN, QT      RHEUMASSURE     reviewed diet, exercise and weight control. Brief written plan, checklist    Assessment/Plan:    Health Maintenance:  - I encouraged her to get all recommended vaccinations and screenings. - DEXA scan and mammogram ordered    ORDERS:  - DEXA BONE DENSITY STUDY AXIAL; Future  - PEEWEE 3D MILEY W MAMMO BI SCREENING INCL CAD; Future      Lab review: labs are reviewed in the [address] [place] [address] (ACP) Provider Conversation     Date of ACP Conversation: 7/28/21  Persons included in Conversation:  patient    Authorized Decision Maker (if patient is incapable of making informed decisions):    This person is:   Next of Kin by law (only applies in absence of a Healthcare Power of  or Legal Guardian)          For Patients with Decision Making Capacity:   Values/Goals: Exploration of values, goals, and preferences if recovery is not expected, even with continued medical treatment in the event of:  Imminent death  Severe, permanent brain injury    Conversation Outcomes / Follow-Up Plan:   ACP in process - information provided, considering goals and options. I encouraged her to complete her advance directive. I have discussed the diagnosis with the patient and the intended plan as seen in the above orders. The patient has received an after-visit summary and questions were answered concerning future plans. I have discussed medication side effects and warnings with the patient as well. I have reviewed the plan of care with the patient, accepted their input and they are in agreement with the treatment goals.

## 2021-08-19 DIAGNOSIS — Z12.31 ENCOUNTER FOR SCREENING MAMMOGRAM FOR BREAST CANCER: ICD-10-CM

## 2021-08-31 ENCOUNTER — TELEPHONE (OUTPATIENT)
Dept: INTERNAL MEDICINE CLINIC | Age: 71
End: 2021-08-31

## 2021-08-31 ENCOUNTER — OFFICE VISIT (OUTPATIENT)
Dept: INTERNAL MEDICINE CLINIC | Age: 71
End: 2021-08-31
Payer: MEDICARE

## 2021-08-31 ENCOUNTER — HOSPITAL ENCOUNTER (OUTPATIENT)
Dept: LAB | Age: 71
Discharge: HOME | End: 2021-08-31
Payer: MEDICARE

## 2021-08-31 VITALS
RESPIRATION RATE: 18 BRPM | WEIGHT: 172 LBS | SYSTOLIC BLOOD PRESSURE: 143 MMHG | TEMPERATURE: 98.2 F | OXYGEN SATURATION: 99 % | BODY MASS INDEX: 33.77 KG/M2 | DIASTOLIC BLOOD PRESSURE: 77 MMHG | HEART RATE: 88 BPM | HEIGHT: 60 IN
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
## 2021-08-31 DIAGNOSIS — N39.0 URINARY TRACT INFECTION WITHOUT HEMATURIA, SITE UNSPECIFIED: Primary | ICD-10-CM

## 2021-08-31 DIAGNOSIS — R30.0 DYSURIA: ICD-10-CM

## 2021-08-31 PROBLEM — M85.80 OSTEOPENIA: Status: ACTIVE | Noted: 2021-08-31

## 2021-08-31 LAB
BILIRUB UR QL STRIP: NEGATIVE
GLUCOSE UR-MCNC: NEGATIVE MG/DL
KETONES P FAST UR STRIP-MCNC: NEGATIVE MG/DL
PH UR STRIP: 6 [PH] (ref 4.6–8)
PROT UR QL STRIP: NORMAL
SP GR UR STRIP: 1.02 (ref 1–1.03)
UA UROBILINOGEN AMB POC: NORMAL (ref 0.2–1)
URINALYSIS CLARITY POC: CLEAR
URINALYSIS COLOR POC: YELLOW
URINE BLOOD POC: NORMAL
URINE LEUKOCYTES POC: NORMAL
URINE NITRITES POC: NEGATIVE

## 2021-08-31 PROCEDURE — G8417 CALC BMI ABV UP PARAM F/U: HCPCS | Performed by: INTERNAL MEDICINE

## 2021-08-31 PROCEDURE — 81001 URINALYSIS AUTO W/SCOPE: CPT | Performed by: INTERNAL MEDICINE

## 2021-08-31 PROCEDURE — G8427 DOCREV CUR MEDS BY ELIG CLIN: HCPCS | Performed by: INTERNAL MEDICINE

## 2021-08-31 PROCEDURE — 87186 SC STD MICRODIL/AGAR DIL: CPT

## 2021-08-31 PROCEDURE — G0463 HOSPITAL OUTPT CLINIC VISIT: HCPCS | Performed by: INTERNAL MEDICINE

## 2021-08-31 PROCEDURE — 3017F COLORECTAL CA SCREEN DOC REV: CPT | Performed by: INTERNAL MEDICINE

## 2021-08-31 PROCEDURE — G8536 NO DOC ELDER MAL SCRN: HCPCS | Performed by: INTERNAL MEDICINE

## 2021-08-31 PROCEDURE — G9899 SCRN MAM PERF RSLTS DOC: HCPCS | Performed by: INTERNAL MEDICINE

## 2021-08-31 PROCEDURE — 1101F PT FALLS ASSESS-DOCD LE1/YR: CPT | Performed by: INTERNAL MEDICINE

## 2021-08-31 PROCEDURE — G8432 DEP SCR NOT DOC, RNG: HCPCS | Performed by: INTERNAL MEDICINE

## 2021-08-31 PROCEDURE — 1090F PRES/ABSN URINE INCON ASSESS: CPT | Performed by: INTERNAL MEDICINE

## 2021-08-31 PROCEDURE — 99214 OFFICE O/P EST MOD 30 MIN: CPT | Performed by: INTERNAL MEDICINE

## 2021-08-31 PROCEDURE — 87086 URINE CULTURE/COLONY COUNT: CPT

## 2021-08-31 PROCEDURE — 87077 CULTURE AEROBIC IDENTIFY: CPT

## 2021-08-31 PROCEDURE — G8399 PT W/DXA RESULTS DOCUMENT: HCPCS | Performed by: INTERNAL MEDICINE

## 2021-08-31 RX ORDER — PHENAZOPYRIDINE HYDROCHLORIDE 200 MG/1
200 TABLET, FILM COATED ORAL
Qty: 6 TABLET | Refills: 0 | Status: SHIPPED | OUTPATIENT
Start: 2021-08-31 | End: 2021-09-03

## 2021-08-31 RX ORDER — PANTOPRAZOLE SODIUM 40 MG/1
40 TABLET, DELAYED RELEASE ORAL 2 TIMES DAILY
COMMUNITY

## 2021-08-31 RX ORDER — CEPHALEXIN 500 MG/1
500 CAPSULE ORAL 2 TIMES DAILY
Qty: 14 CAPSULE | Refills: 0 | Status: SHIPPED | OUTPATIENT
Start: 2021-08-31 | End: 2021-10-01 | Stop reason: ALTCHOICE

## 2021-08-31 NOTE — PATIENT INSTRUCTIONS
Painful Urination (Dysuria): Care Instructions  Your Care Instructions  Burning pain with urination (dysuria) is a common symptom of a urinary tract infection or other urinary problems. The bladder may become inflamed. This can cause pain when the bladder fills and empties. You may also feel pain if the tube that carries urine from the bladder to the outside of the body (urethra) gets irritated or infected. Sexually transmitted infections (STIs) also may cause pain when you urinate. Sometimes the pain can be caused by things other than an infection. The urethra can be irritated by soaps, perfumes, or foreign objects in the urethra. Kidney stones can cause pain when they pass through the urethra. The cause may be hard to find. You may need tests. Treatment for painful urination depends on the cause. Follow-up care is a key part of your treatment and safety. Be sure to make and go to all appointments, and call your doctor if you are having problems. It's also a good idea to know your test results and keep a list of the medicines you take. How can you care for yourself at home? · Drink extra water for the next day or two. This will help make the urine less concentrated. (If you have kidney, heart, or liver disease and have to limit fluids, talk with your doctor before you increase the amount of fluids you drink.)  · Avoid drinks that are carbonated or have caffeine. They can irritate the bladder. · Urinate often. Try to empty your bladder each time. For women:  · Urinate right after you have sex. · After going to the bathroom, wipe from front to back. · Avoid douches, bubble baths, and feminine hygiene sprays. And avoid other feminine hygiene products that have deodorants. When should you call for help? Call your doctor now or seek immediate medical care if:    · You have new symptoms, such as fever, nausea, or vomiting.     · You have new or worse symptoms of a urinary problem. For example:  ?  You have blood or pus in your urine. ? You have chills or body aches. ? It hurts worse to urinate. ? You have groin or belly pain. ? You have pain in your back just below your rib cage (the flank area). Watch closely for changes in your health, and be sure to contact your doctor if you have any problems. Where can you learn more? Go to http://www.gray.com/  Enter H814 in the search box to learn more about \"Painful Urination (Dysuria): Care Instructions. \"  Current as of: June 29, 2020               Content Version: 12.8  © 9594-3868 [employer]. Care instructions adapted under license by [employer] (which disclaims liability or warranty for this information). If you have questions about a medical condition or this instruction, always ask your healthcare professional. [address] any warranty or liability for your use of this information.

## 2021-08-31 NOTE — TELEPHONE ENCOUNTER
Pt asking to speak with Dr Rebecca Jackson nurse    Symptoms since midnight last night of frequent painful urination, stated was up all night.  Tinge of pink, and when she did go it was only a drop      Pharmacy is [facility], [facility]

## 2021-08-31 NOTE — PROGRESS NOTES
Tresa Garcia presents today for   Chief Complaint   Patient presents with    Dysuria     x 1 day with hematuria    Urinary Frequency            Coordination of Care:  1. Have you been to the ER, urgent care clinic since your last visit? Hospitalized since your last visit? no    2. Have you seen or consulted any other health care providers outside of the [address] [place] [address] since your last visit? Include any pap smears or colon screening.  yes

## 2021-08-31 NOTE — PROGRESS NOTES
INTERNISTS OF [facility]:  8/31/2021, MRN: [id number]      Thiago Lawson is a 80 y.o. female and presents to clinic for Dysuria (x 1 day with hematuria) and Urinary Frequency      Subjective: The pt is a 81 yo female with h/o hypothyroidism, NAFLD, B12 deficiency, GERD, diverticulitis per EHR, s/p abx from left knee infection after a knee replacement surgery, OA, asthma, and allergic rhinitis. 1. UTI: She reports dysuria and urinary frequency sx since last night at midnight. \"I haven't slept. \" Last time she had sx like this she was diagnosed with a UTI. Pain is pressure like. No fever. +Chills. She has pain just at the end of urination. She is weaning off of estrace. She takes it for menopausal sx. 2. Fall: She had a bad fall at night (8/1/21); she stumbled over a rug in her home. She fell down on her left knee. +H/o left knee replacement surgery. She saw Orthopedics and was told that the knee replacement hardware was ok. S/p xray. She has balance issues. +H/o lumbar facet arthropathy. She is interested in doing balance training PT after she completes her left shoulder PT sessions. Patient Active Problem List    Diagnosis Date Noted    DYER (nonalcoholic steatohepatitis) 08/21/2020    Menopausal symptoms 08/21/2020    History of non anemic vitamin B12 deficiency 08/21/2020    Lumbar facet arthropathy 08/21/2020    Vitamin B12 deficiency 08/14/2017    Gastroesophageal reflux disease without esophagitis 02/14/2017    Hypothyroidism due to Hashimoto's thyroiditis 02/14/2017    History of diverticulitis 10/13/2016    Hyperlipidemia LDL goal <130 05/11/2016    Right knee pain     Arthritis of left knee 04/29/2015    Venous insufficiency 09/09/2013    Allergic rhinitis     Asthma        Current Outpatient Medications   Medication Sig Dispense Refill    pantoprazole (Protonix) 40 mg tablet Take 40 mg by mouth two (2) times a day.       levothyroxine (SYNTHROID) 125 mcg tablet Take 1 Tablet by mouth Daily (before breakfast). 90 Tablet 3    estradioL (ESTRACE) 1 mg tablet Take 1 Tab by mouth daily. 90 Tab 3    cyanocobalamin (VITAMIN B-12) 1,000 mcg tablet Take 1,000 mcg by mouth daily.  metroNIDAZOLE (FlagyL) 500 mg tablet Take 1 Tab by mouth three (3) times daily. (Patient not taking: Reported on 7/28/2021) 30 Tab 0       Allergies   Allergen Reactions    Atorvastatin Unknown (comments) and Other (comments)     Myalgia    Myalgia    Septra [Sulfamethoprim Ds] Shortness of Breath and Rash    Sulfa (Sulfonamide Antibiotics) Hives, Shortness of Breath and Other (comments)     respiratory distress    Oxycodone Itching and Other (comments)     Itching and difficulty swallowing \"my tongue felt real thick\". Past Medical History:   Diagnosis Date    Allergic rhinitis     Asthma     Diverticulosis     DJD (degenerative joint disease)     DJD (degenerative joint disease)     GERD (gastroesophageal reflux disease)     Headache(784.0)     Hyperlipidemia     Hypothyroidism     Overweight(278.02)     with weight gain    Right knee pain        Past Surgical History:   Procedure Laterality Date    ENDOSCOPY, COLON, DIAGNOSTIC      HX APPENDECTOMY      HX CHOLECYSTECTOMY      HX HYSTERECTOMY      HX KNEE ARTHROSCOPY      HX ORTHOPAEDIC      left knee    HX TONSILLECTOMY      HX TUBAL LIGATION      TX BREAST SURGERY PROCEDURE UNLISTED      reduction mammoplasty       Family History   Problem Relation Age of Onset    Arthritis-osteo Mother     Heart Disease Mother     Diabetes Father     Cancer Father     Arthritis-osteo Brother     Diabetes Brother        Social History     Tobacco Use    Smoking status: Never Smoker    Smokeless tobacco: Never Used   Substance Use Topics    Alcohol use: No       ROS   Review of Systems   Constitutional: Negative for chills and fever. HENT: Negative for ear pain and sore throat. Eyes: Negative for blurred vision and pain. Respiratory: Negative for cough and shortness of breath. Cardiovascular: Negative for chest pain. Gastrointestinal: Negative for blood in stool and melena. Genitourinary: Positive for dysuria and frequency. Negative for hematuria and urgency. Musculoskeletal: Positive for back pain and joint pain (left shoulder pain; she has an apt with Orthopedics; she is doing PT). Negative for myalgias. Skin: Negative for rash. Neurological: Negative for headaches. Endo/Heme/Allergies: Does not bruise/bleed easily. Psychiatric/Behavioral: Negative for substance abuse. Objective     Vitals:    08/31/21 1054 08/31/21 1103   BP: (!) 143/77 (!) 143/77   Pulse: 88    Resp: 18    Temp: 98.2 °F (36.8 °C)    TempSrc: Temporal    SpO2: 99%    Weight: 172 lb (78 kg)    Height: 4' 11.84\" (1.52 m)    PainSc:   8        Physical Exam  Vitals and nursing note reviewed. HENT:      Head: Normocephalic and atraumatic. Right Ear: External ear normal.      Left Ear: External ear normal.      Mouth/Throat:      Pharynx: No oropharyngeal exudate. Eyes:      General: No scleral icterus. Right eye: No discharge. Left eye: No discharge. Conjunctiva/sclera: Conjunctivae normal.   Cardiovascular:      Rate and Rhythm: Normal rate and regular rhythm. Heart sounds: Normal heart sounds. No murmur heard. No friction rub. No gallop. Pulmonary:      Effort: Pulmonary effort is normal. No respiratory distress. Breath sounds: Normal breath sounds. No wheezing or rales. Abdominal:      General: Bowel sounds are normal. There is no distension. Palpations: Abdomen is soft. There is no mass. Tenderness: There is abdominal tenderness (mild TTP along her suprapubic area). There is no guarding or rebound. Musculoskeletal:         General: No swelling (BUE) or tenderness (BUE). Cervical back: Neck supple. Lymphadenopathy:      Cervical: No cervical adenopathy.    Skin:     General: Skin is warm and dry. Findings: No erythema. Neurological:      Mental Status: She is alert and oriented to person, place, and time. Motor: No abnormal muscle tone. Gait: Gait is intact. Gait normal.   Psychiatric:         Mood and Affect: Mood and affect normal.         LABS   Data Review:   Lab Results   Component Value Date/Time    WBC 11.3 07/21/2021 10:15 AM    HGB 13.2 07/21/2021 10:15 AM    HCT 41.2 07/21/2021 10:15 AM    PLATELET 981 41/76/1755 10:15 AM    MCV 95.8 07/21/2021 10:15 AM       Lab Results   Component Value Date/Time    Sodium 141 07/21/2021 10:15 AM    Potassium 4.2 07/21/2021 10:15 AM    Chloride 105 07/21/2021 10:15 AM    CO2 27 07/21/2021 10:15 AM    Anion gap 9 07/21/2021 10:15 AM    Glucose 97 07/21/2021 10:15 AM    BUN 17 07/21/2021 10:15 AM    Creatinine 0.67 07/21/2021 10:15 AM    BUN/Creatinine ratio 25 (H) 07/21/2021 10:15 AM    GFR est AA >60 07/21/2021 10:15 AM    GFR est non-AA >60 07/21/2021 10:15 AM    Calcium 9.2 07/21/2021 10:15 AM       Lab Results   Component Value Date/Time    Cholesterol, total 250 (H) 07/21/2021 10:15 AM    HDL Cholesterol 78 (H) 07/21/2021 10:15 AM    LDL, calculated 138.2 (H) 07/21/2021 10:15 AM    VLDL, calculated 33.8 07/21/2021 10:15 AM    Triglyceride 169 (H) 07/21/2021 10:15 AM    CHOL/HDL Ratio 3.2 07/21/2021 10:15 AM       No results found for: HBA1C, RCB6QEZS, OBT5ETVY    Assessment/Plan:   1. Dysuria: Her UA today shows leukocyte esterase. Findings are c/w a UTI  - Urine cx ordered. - Keflex course prescribed. - Pyridium ordered for sx relief. - Pt instructed to notify me if her sx worsen. ORDERS:  - AMB POC URINALYSIS DIP STICK AUTO W/ MICRO  - CULTURE, URINE; Future  - cephALEXin (Keflex) 500 mg capsule; Take 1 Capsule by mouth two (2) times a day. Dispense: 14 Capsule; Refill: 0  - phenazopyridine (Pyridium) 200 mg tablet; Take 1 Tablet by mouth three (3) times daily as needed for Pain for up to 3 days.   Dispense: 6 Tablet; Refill: 0    2. Fall: +Ataxia. - She wants to pursue PT for ataxia once she completes shoulder based PT. She will let me know when she wants the order for PT (dx: ataxia) to be placed. - Fall risk reduction measures were discussed. Health Maintenance Due   Topic Date Due    Shingrix Vaccine Age 49> (1 of 2) Never done         Lab review: labs are reviewed in the EHR and ordered as mentioned above    I have discussed the diagnosis with the patient and the intended plan as seen in the above orders. The patient has received an after-visit summary and questions were answered concerning future plans. I have discussed medication side effects and warnings with the patient as well. I have reviewed the plan of care with the patient, accepted their input and they are in agreement with the treatment goals. All questions were answered. The patient understands the plan of care. Handouts provided today with above information. Pt instructed if symptoms worsen to call the office or report to the ED for continued care. Greater than 50% of the visit time was spent in counseling and/or coordination of care. Voice recognition was used to generate this report, which may have resulted in some phonetic based errors in grammar and contents. Even though attempts were made to correct all the mistakes, some may have been missed, and remained in the body of the document.           Josh Cai MD

## 2021-09-01 ENCOUNTER — TELEPHONE (OUTPATIENT)
Dept: INTERNAL MEDICINE CLINIC | Age: 71
End: 2021-09-01

## 2021-09-01 NOTE — TELEPHONE ENCOUNTER
----- Message from Purnima Guillory MD sent at 8/31/2021  2:03 PM EDT -----  Please let her know that her bone density study shows findings consistent with osteopenia. We should repeat this in 2 to 3 years to see if her bone density has worsened. At this time, no medication is warranted to strengthen her bones.     Dr. Karlee Graf  Internists of [address]  Phone: [phone]  Fax: [phone]

## 2021-09-03 LAB
BACTERIA SPEC CULT: ABNORMAL
CC UR VC: ABNORMAL
SERVICE CMNT-IMP: ABNORMAL

## 2021-09-17 ENCOUNTER — HOSPITAL ENCOUNTER (OUTPATIENT)
Dept: LAB | Age: 71
Discharge: HOME | End: 2021-09-17
Payer: MEDICARE

## 2021-09-17 ENCOUNTER — APPOINTMENT (OUTPATIENT)
Dept: INTERNAL MEDICINE CLINIC | Age: 71
End: 2021-09-17

## 2021-09-17 DIAGNOSIS — E78.5 HYPERLIPIDEMIA LDL GOAL <130: ICD-10-CM

## 2021-09-17 DIAGNOSIS — R73.9 HYPERGLYCEMIA: ICD-10-CM

## 2021-09-17 DIAGNOSIS — E03.8 HYPOTHYROIDISM DUE TO HASHIMOTO'S THYROIDITIS: ICD-10-CM

## 2021-09-17 DIAGNOSIS — E06.3 HYPOTHYROIDISM DUE TO HASHIMOTO'S THYROIDITIS: ICD-10-CM

## 2021-09-17 DIAGNOSIS — K75.81 NASH (NONALCOHOLIC STEATOHEPATITIS): ICD-10-CM

## 2021-09-17 DIAGNOSIS — M25.569 ARTHRALGIA OF KNEE, UNSPECIFIED LATERALITY: ICD-10-CM

## 2021-09-17 DIAGNOSIS — R79.82 ELEVATED C-REACTIVE PROTEIN (CRP): ICD-10-CM

## 2021-09-17 DIAGNOSIS — E53.8 VITAMIN B12 DEFICIENCY: ICD-10-CM

## 2021-09-17 LAB
ALBUMIN SERPL-MCNC: 3.8 G/DL (ref 3.4–5)
ALBUMIN/GLOB SERPL: 1.1 {RATIO} (ref 0.8–1.7)
ALP SERPL-CCNC: 78 U/L (ref 45–117)
ALT SERPL-CCNC: 26 U/L (ref 13–56)
ANION GAP SERPL CALC-SCNC: 6 MMOL/L (ref 3–18)
AST SERPL-CCNC: 19 U/L (ref 10–38)
BASOPHILS # BLD: 0.1 K/UL (ref 0–0.1)
BASOPHILS NFR BLD: 1 % (ref 0–2)
BILIRUB SERPL-MCNC: 0.4 MG/DL (ref 0.2–1)
BUN SERPL-MCNC: 14 MG/DL (ref 7–18)
BUN/CREAT SERPL: 18 (ref 12–20)
CALCIUM SERPL-MCNC: 9.4 MG/DL (ref 8.5–10.1)
CHLORIDE SERPL-SCNC: 107 MMOL/L (ref 100–111)
CHOLEST SERPL-MCNC: 276 MG/DL
CO2 SERPL-SCNC: 28 MMOL/L (ref 21–32)
CREAT SERPL-MCNC: 0.76 MG/DL (ref 0.6–1.3)
CRP SERPL-MCNC: 1 MG/DL (ref 0–0.3)
DIFFERENTIAL METHOD BLD: NORMAL
EOSINOPHIL # BLD: 0.1 K/UL (ref 0–0.4)
EOSINOPHIL NFR BLD: 1 % (ref 0–5)
ERYTHROCYTE [DISTWIDTH] IN BLOOD BY AUTOMATED COUNT: 12.6 % (ref 11.6–14.5)
EST. AVERAGE GLUCOSE BLD GHB EST-MCNC: 120 MG/DL
GLOBULIN SER CALC-MCNC: 3.5 G/DL (ref 2–4)
GLUCOSE SERPL-MCNC: 96 MG/DL (ref 74–99)
HBA1C MFR BLD: 5.8 % (ref 4.2–5.6)
HCT VFR BLD AUTO: 41.1 % (ref 35–45)
HDLC SERPL-MCNC: 73 MG/DL (ref 40–60)
HDLC SERPL: 3.8 {RATIO} (ref 0–5)
HGB BLD-MCNC: 13.6 G/DL (ref 12–16)
LDLC SERPL CALC-MCNC: 171.2 MG/DL (ref 0–100)
LIPID PROFILE,FLP: ABNORMAL
LYMPHOCYTES # BLD: 2.7 K/UL (ref 0.9–3.6)
LYMPHOCYTES NFR BLD: 26 % (ref 21–52)
MCH RBC QN AUTO: 30.7 PG (ref 24–34)
MCHC RBC AUTO-ENTMCNC: 33.1 G/DL (ref 31–37)
MCV RBC AUTO: 92.8 FL (ref 78–100)
MONOCYTES # BLD: 0.8 K/UL (ref 0.05–1.2)
MONOCYTES NFR BLD: 8 % (ref 3–10)
NEUTS SEG # BLD: 6.7 K/UL (ref 1.8–8)
NEUTS SEG NFR BLD: 64 % (ref 40–73)
PLATELET # BLD AUTO: 376 K/UL (ref 135–420)
PMV BLD AUTO: 10 FL (ref 9.2–11.8)
POTASSIUM SERPL-SCNC: 4.4 MMOL/L (ref 3.5–5.5)
PROT SERPL-MCNC: 7.3 G/DL (ref 6.4–8.2)
RBC # BLD AUTO: 4.43 M/UL (ref 4.2–5.3)
SODIUM SERPL-SCNC: 141 MMOL/L (ref 136–145)
T4 FREE SERPL-MCNC: 1.4 NG/DL (ref 0.7–1.5)
TRIGL SERPL-MCNC: 159 MG/DL (ref ?–150)
TSH SERPL DL<=0.05 MIU/L-ACNC: 0.37 UIU/ML (ref 0.36–3.74)
VIT B12 SERPL-MCNC: 920 PG/ML (ref 211–911)
VLDLC SERPL CALC-MCNC: 31.8 MG/DL
WBC # BLD AUTO: 10.5 K/UL (ref 4.6–13.2)

## 2021-09-17 PROCEDURE — 86140 C-REACTIVE PROTEIN: CPT

## 2021-09-17 PROCEDURE — 36415 COLL VENOUS BLD VENIPUNCTURE: CPT

## 2021-09-17 PROCEDURE — 84439 ASSAY OF FREE THYROXINE: CPT

## 2021-09-17 PROCEDURE — 80053 COMPREHEN METABOLIC PANEL: CPT

## 2021-09-17 PROCEDURE — 82607 VITAMIN B-12: CPT

## 2021-09-17 PROCEDURE — 80061 LIPID PANEL: CPT

## 2021-09-17 PROCEDURE — 83036 HEMOGLOBIN GLYCOSYLATED A1C: CPT

## 2021-09-17 PROCEDURE — 85025 COMPLETE CBC W/AUTO DIFF WBC: CPT

## 2021-09-26 NOTE — PROGRESS NOTES
Please schedule her for an apt to discuss her recent lab results. Her A1C is 5.8. Her total cholesterol is 276. Triglycerides are 159. HDL is 73. LDL is 171, up from 138. Her CMP and TFTs are WNL. Her CRP is unchanged at 1. B12 is mildly elevated at 920. Her CBC is WNL.     Dr. Ozzy Hoffman  [facility], [address] [place] [address], [address] [place] [address] Str.  Phone: [phone]  Fax: [phone]

## 2021-09-27 ENCOUNTER — TELEPHONE (OUTPATIENT)
Dept: INTERNAL MEDICINE CLINIC | Age: 71
End: 2021-09-27

## 2021-09-27 NOTE — TELEPHONE ENCOUNTER
----- Message from Joel Byers MD sent at 9/26/2021 10:25 AM EDT -----  Please schedule her for an apt to discuss her recent lab results. Her A1C is 5.8. Her total cholesterol is 276. Triglycerides are 159. HDL is 73. LDL is 171, up from 138. Her CMP and TFTs are WNL. Her CRP is unchanged at 1. B12 is mildly elevated at 920. Her CBC is WNL.     Dr. Loren Arteaga  [facility], [address] [place] [address], [facility] [address]  Phone: [phone]  Fax: [phone]

## 2021-09-27 NOTE — TELEPHONE ENCOUNTER
Your schedule is full for the next month. Is there a time you want to offer or is next available ok?

## 2021-09-30 NOTE — PROGRESS NOTES
Arin Calles presents today for   Chief Complaint   Patient presents with   Rhonda See Follow-up    Labs     9-17-21              Coordination of Care:  1. Have you been to the ER, urgent care clinic since your last visit? Hospitalized since your last visit? NO    2. Have you seen or consulted any other health care providers outside of the [address] [place] [address] since your last visit? Include any pap smears or colon screening.  YES

## 2021-10-01 ENCOUNTER — OFFICE VISIT (OUTPATIENT)
Dept: INTERNAL MEDICINE CLINIC | Age: 71
End: 2021-10-01
Payer: MEDICARE

## 2021-10-01 ENCOUNTER — TELEPHONE (OUTPATIENT)
Dept: INTERNAL MEDICINE CLINIC | Age: 71
End: 2021-10-01

## 2021-10-01 VITALS
WEIGHT: 170 LBS | RESPIRATION RATE: 16 BRPM | OXYGEN SATURATION: 96 % | DIASTOLIC BLOOD PRESSURE: 80 MMHG | TEMPERATURE: 97.8 F | SYSTOLIC BLOOD PRESSURE: 134 MMHG | HEIGHT: 59 IN | HEART RATE: 90 BPM | BODY MASS INDEX: 34.27 KG/M2

## 2021-10-01 DIAGNOSIS — E06.3 HYPOTHYROIDISM DUE TO HASHIMOTO'S THYROIDITIS: Primary | ICD-10-CM

## 2021-10-01 DIAGNOSIS — E78.5 HYPERLIPIDEMIA LDL GOAL <130: ICD-10-CM

## 2021-10-01 DIAGNOSIS — R29.6 RECURRENT FALLS: ICD-10-CM

## 2021-10-01 DIAGNOSIS — E03.8 HYPOTHYROIDISM DUE TO HASHIMOTO'S THYROIDITIS: Primary | ICD-10-CM

## 2021-10-01 DIAGNOSIS — R73.9 HYPERGLYCEMIA: ICD-10-CM

## 2021-10-01 DIAGNOSIS — R26.89 BALANCE PROBLEM: ICD-10-CM

## 2021-10-01 DIAGNOSIS — R79.82 ELEVATED C-REACTIVE PROTEIN (CRP): ICD-10-CM

## 2021-10-01 DIAGNOSIS — M25.50 ARTHRALGIA, UNSPECIFIED JOINT: ICD-10-CM

## 2021-10-01 DIAGNOSIS — E53.8 VITAMIN B12 DEFICIENCY: ICD-10-CM

## 2021-10-01 PROCEDURE — G8432 DEP SCR NOT DOC, RNG: HCPCS | Performed by: INTERNAL MEDICINE

## 2021-10-01 PROCEDURE — 1090F PRES/ABSN URINE INCON ASSESS: CPT | Performed by: INTERNAL MEDICINE

## 2021-10-01 PROCEDURE — G8417 CALC BMI ABV UP PARAM F/U: HCPCS | Performed by: INTERNAL MEDICINE

## 2021-10-01 PROCEDURE — 1101F PT FALLS ASSESS-DOCD LE1/YR: CPT | Performed by: INTERNAL MEDICINE

## 2021-10-01 PROCEDURE — G8536 NO DOC ELDER MAL SCRN: HCPCS | Performed by: INTERNAL MEDICINE

## 2021-10-01 PROCEDURE — G0463 HOSPITAL OUTPT CLINIC VISIT: HCPCS | Performed by: INTERNAL MEDICINE

## 2021-10-01 PROCEDURE — 3017F COLORECTAL CA SCREEN DOC REV: CPT | Performed by: INTERNAL MEDICINE

## 2021-10-01 PROCEDURE — G8399 PT W/DXA RESULTS DOCUMENT: HCPCS | Performed by: INTERNAL MEDICINE

## 2021-10-01 PROCEDURE — G8427 DOCREV CUR MEDS BY ELIG CLIN: HCPCS | Performed by: INTERNAL MEDICINE

## 2021-10-01 PROCEDURE — G9899 SCRN MAM PERF RSLTS DOC: HCPCS | Performed by: INTERNAL MEDICINE

## 2021-10-01 PROCEDURE — 99214 OFFICE O/P EST MOD 30 MIN: CPT | Performed by: INTERNAL MEDICINE

## 2021-10-01 NOTE — PATIENT INSTRUCTIONS

## 2021-10-01 NOTE — PROGRESS NOTES
[facility]:  10/1/2021, MRN: [id number]      Leeroy Finch is a 80 y.o. female and presents to clinic for Follow-up and Labs (9-17-21)      Subjective: The pt is a 81 yo female with h/o hypothyroidism, NAFLD, B12 deficiency, GERD, diverticulitis per EHR, s/p abx from left knee infection after a knee replacement surgery, OA, asthma, and allergic rhinitis. 1. Lower Back Pain: Along her right lower back x 2 days. Just prior to sx onset, she did a lot of house chores and yardwork. She is using aspercream OTC. Pain is 5/10. She has a h/o recurrent falls. +Arthralgias. 2. Stress: The pandemic has been very stressful. \"The world is different and I don't like it. \" She misses her friends. One moved away and another is \"terrified to go out. \" She lives with her boyfriend of 21 yrs, Izabella Murray. 3. B12 Deficiency: Her recent level is elevated. She takes B12 but has cut back on taking this supplement. 4. HLD: Not on cholesterol lowering rx. Her LDL per her recent labs was in the 170s range. She walks her dog (Corgie) regularly. She stopped her estadiol rx for the past 2 wks. She is not on any cholesterol lowering rx. 5. Hypothyroidism: Taking Synthroid. Her TFTs per her recent labs were WNL. 6. Prediabetes: Her A1C is 5.8.        Patient Active Problem List    Diagnosis Date Noted    Osteopenia 08/31/2021    DYER (nonalcoholic steatohepatitis) 08/21/2020    Menopausal symptoms 08/21/2020    History of non anemic vitamin B12 deficiency 08/21/2020    Lumbar facet arthropathy 08/21/2020    Vitamin B12 deficiency 08/14/2017    Gastroesophageal reflux disease without esophagitis 02/14/2017    Hypothyroidism due to Hashimoto's thyroiditis 02/14/2017    History of diverticulitis 10/13/2016    Hyperlipidemia LDL goal <130 05/11/2016    Right knee pain     Arthritis of left knee 04/29/2015    Venous insufficiency 09/09/2013    Allergic rhinitis     Asthma        Current Outpatient Medications   Medication Sig Dispense Refill    pantoprazole (Protonix) 40 mg tablet Take 40 mg by mouth two (2) times a day.  levothyroxine (SYNTHROID) 125 mcg tablet Take 1 Tablet by mouth Daily (before breakfast). 90 Tablet 3    estradioL (ESTRACE) 1 mg tablet Take 1 Tab by mouth daily. (Patient not taking: Reported on 10/1/2021) 90 Tab 3    cyanocobalamin (VITAMIN B-12) 1,000 mcg tablet Take 1,000 mcg by mouth daily. (Patient not taking: Reported on 10/1/2021)         Allergies   Allergen Reactions    Atorvastatin Unknown (comments) and Other (comments)     Myalgia    Myalgia    Septra [Sulfamethoprim Ds] Shortness of Breath and Rash    Sulfa (Sulfonamide Antibiotics) Hives, Shortness of Breath and Other (comments)     respiratory distress    Oxycodone Itching and Other (comments)     Itching and difficulty swallowing \"my tongue felt real thick\".        Past Medical History:   Diagnosis Date    Allergic rhinitis     Asthma     Diverticulosis     DJD (degenerative joint disease)     DJD (degenerative joint disease)     GERD (gastroesophageal reflux disease)     Headache(784.0)     Hyperlipidemia     Hypothyroidism     Overweight(278.02)     with weight gain    Right knee pain        Past Surgical History:   Procedure Laterality Date    ENDOSCOPY, COLON, DIAGNOSTIC      HX APPENDECTOMY      HX CHOLECYSTECTOMY      HX HYSTERECTOMY      HX KNEE ARTHROSCOPY      HX ORTHOPAEDIC      left knee    HX TONSILLECTOMY      HX TUBAL LIGATION      AR BREAST SURGERY PROCEDURE UNLISTED      reduction mammoplasty       Family History   Problem Relation Age of Onset    Arthritis-osteo Mother     Heart Disease Mother     Diabetes Father     Cancer Father     Arthritis-osteo Brother     Diabetes Brother        Social History     Tobacco Use    Smoking status: Never Smoker    Smokeless tobacco: Never Used   Substance Use Topics    Alcohol use: No       ROS   Review of Systems   Constitutional: Negative for chills and fever. HENT: Negative for ear pain and sore throat. Eyes: Negative for blurred vision and pain. Respiratory: Negative for cough and shortness of breath. Cardiovascular: Negative for chest pain. Gastrointestinal: Negative for abdominal pain, blood in stool and melena. Genitourinary: Negative for dysuria and hematuria. Musculoskeletal: Positive for back pain, joint pain and myalgias. Skin: Negative for rash. Neurological: Negative for headaches. Endo/Heme/Allergies: Does not bruise/bleed easily. Psychiatric/Behavioral: Negative for substance abuse. Objective     Vitals:    10/01/21 1452   BP: 134/80   Pulse: 90   Resp: 16   Temp: 97.8 °F (36.6 °C)   TempSrc: Temporal   SpO2: 96%   Weight: 170 lb (77.1 kg)   Height: 4' 11\" (1.499 m)   PainSc:   5   PainLoc: Back       Physical Exam  Vitals and nursing note reviewed. HENT:      Head: Normocephalic and atraumatic. Right Ear: External ear normal.      Left Ear: External ear normal.   Eyes:      General: No scleral icterus. Right eye: No discharge. Left eye: No discharge. Conjunctiva/sclera: Conjunctivae normal.   Cardiovascular:      Rate and Rhythm: Normal rate and regular rhythm. Heart sounds: Normal heart sounds. No murmur heard. No friction rub. No gallop. Pulmonary:      Effort: Pulmonary effort is normal. No respiratory distress. Breath sounds: Normal breath sounds. No wheezing or rales. Abdominal:      General: Bowel sounds are normal. There is no distension. Palpations: Abdomen is soft. There is no mass. Tenderness: There is no abdominal tenderness. There is no guarding or rebound. Musculoskeletal:         General: No swelling (BUE) or tenderness (BUE). Cervical back: Neck supple. Comments: Her lumbar spinous processes are mildly TTP   Lymphadenopathy:      Cervical: No cervical adenopathy. Skin:     General: Skin is warm and dry. Findings: No erythema or rash. Neurological:      Mental Status: She is alert. Motor: No abnormal muscle tone. Gait: Gait normal.   Psychiatric:         Mood and Affect: Mood normal.         LABS   Data Review:   Lab Results   Component Value Date/Time    WBC 10.5 09/17/2021 11:37 AM    HGB 13.6 09/17/2021 11:37 AM    HCT 41.1 09/17/2021 11:37 AM    PLATELET 773 65/58/0138 11:37 AM    MCV 92.8 09/17/2021 11:37 AM       Lab Results   Component Value Date/Time    Sodium 141 09/17/2021 11:37 AM    Potassium 4.4 09/17/2021 11:37 AM    Chloride 107 09/17/2021 11:37 AM    CO2 28 09/17/2021 11:37 AM    Anion gap 6 09/17/2021 11:37 AM    Glucose 96 09/17/2021 11:37 AM    BUN 14 09/17/2021 11:37 AM    Creatinine 0.76 09/17/2021 11:37 AM    BUN/Creatinine ratio 18 09/17/2021 11:37 AM    GFR est AA >60 09/17/2021 11:37 AM    GFR est non-AA >60 09/17/2021 11:37 AM    Calcium 9.4 09/17/2021 11:37 AM       Lab Results   Component Value Date/Time    Cholesterol, total 276 (H) 09/17/2021 11:37 AM    HDL Cholesterol 73 (H) 09/17/2021 11:37 AM    LDL, calculated 171.2 (H) 09/17/2021 11:37 AM    VLDL, calculated 31.8 09/17/2021 11:37 AM    Triglyceride 159 (H) 09/17/2021 11:37 AM    CHOL/HDL Ratio 3.8 09/17/2021 11:37 AM       Lab Results   Component Value Date/Time    Hemoglobin A1c 5.8 (H) 09/17/2021 11:37 AM       Assessment/Plan:   1. Hypothyroidism due to Hashimoto's thyroiditis:   - Checking labs in 6 months  - C/w rx as prescribed. ORDERS:  - C REACTIVE PROTEIN, QT; Future  - METABOLIC PANEL, COMPREHENSIVE; Future  - TSH AND FREE T4; Future  - CBC WITH AUTOMATED DIFF; Future    2. Hyperlipidemia LDL goal <130  - Aggressive lifestyle changes were discussed. - Checking labs in 6 months. ORDERS:  - LIPID PANEL; Future  - METABOLIC PANEL, COMPREHENSIVE; Future    3.  Vitamin B12 deficiency:   - Checking labs in 6 months.  - Ok to cut back on B12 use given her recent elevated B12 level    ORDERS:  - VITAMIN B12; Future  - CBC WITH AUTOMATED DIFF; Future    4. Hyperglycemia/Prediabetes:  - Lifestyle modification measures were discussed. I encouraged her to maintain a low carb diet  - Checking labs in 6 months. ORDERS:  - HEMOGLOBIN A1C WITH EAG; Future  - METABOLIC PANEL, COMPREHENSIVE; Future    5. Arthralgias: +Elevated C-reactive protein (CRP) hx. +Falls  - Checking labs. - Activity as tolerated. - Referral to PT    ORDERS:  - RHEUMASSURE; Future  - C REACTIVE PROTEIN, QT; Future  - JUAN C COMPREHENSIVE PANEL; Future  - REFERRAL TO PHYSICAL THERAPY        Health Maintenance Due   Topic Date Due    Shingrix Vaccine Age 49> (1 of 2) Never done         Lab review: labs are reviewed in the EHR and ordered as mentioned above    I have discussed the diagnosis with the patient and the intended plan as seen in the above orders. The patient has received an after-visit summary and questions were answered concerning future plans. I have discussed medication side effects and warnings with the patient as well. I have reviewed the plan of care with the patient, accepted their input and they are in agreement with the treatment goals. All questions were answered. The patient understands the plan of care. Handouts provided today with above information. Pt instructed if symptoms worsen to call the office or report to the ED for continued care. Greater than 50% of the visit time was spent in counseling and/or coordination of care. Voice recognition was used to generate this report, which may have resulted in some phonetic based errors in grammar and contents. Even though attempts were made to correct all the mistakes, some may have been missed, and remained in the body of the document.           Frank Gupta MD

## 2021-10-01 NOTE — TELEPHONE ENCOUNTER
At check out today 10/1/21, pt stated for physical therapy referral, she wants to go to:    [facility]  Phone #: [phone]

## 2021-11-01 RX ORDER — ESTRADIOL 1 MG/1
1 TABLET ORAL DAILY
Qty: 90 TABLET | Refills: 3 | Status: SHIPPED | OUTPATIENT
Start: 2021-11-01

## 2022-03-18 PROBLEM — E03.8 HYPOTHYROIDISM DUE TO HASHIMOTO'S THYROIDITIS: Status: ACTIVE | Noted: 2017-02-14

## 2022-03-18 PROBLEM — Z86.39 HISTORY OF NON ANEMIC VITAMIN B12 DEFICIENCY: Status: ACTIVE | Noted: 2020-08-21

## 2022-03-18 PROBLEM — E06.3 HYPOTHYROIDISM DUE TO HASHIMOTO'S THYROIDITIS: Status: ACTIVE | Noted: 2017-02-14

## 2022-03-18 PROBLEM — N95.1 MENOPAUSAL SYMPTOMS: Status: ACTIVE | Noted: 2020-08-21

## 2022-03-19 PROBLEM — K21.9 GASTROESOPHAGEAL REFLUX DISEASE WITHOUT ESOPHAGITIS: Status: ACTIVE | Noted: 2017-02-14

## 2022-03-19 PROBLEM — M85.80 OSTEOPENIA: Status: ACTIVE | Noted: 2021-08-31

## 2022-03-19 PROBLEM — K75.81 NASH (NONALCOHOLIC STEATOHEPATITIS): Status: ACTIVE | Noted: 2020-08-21

## 2022-03-19 PROBLEM — M47.816 LUMBAR FACET ARTHROPATHY: Status: ACTIVE | Noted: 2020-08-21

## 2022-03-20 PROBLEM — E53.8 VITAMIN B12 DEFICIENCY: Status: ACTIVE | Noted: 2017-08-14

## 2022-05-10 NOTE — PATIENT INSTRUCTIONS
Patient was given a copy of the [employer] Directive Form, and understands to bring it in once completed. Health Maintenance Due Topic Date Due  Shingrix Vaccine Age 50> (1 of 2) 02/02/2001  DTaP/Tdap/Td series (1 - Tdap) 04/22/2011
[place]

## 2022-07-22 ENCOUNTER — APPOINTMENT (OUTPATIENT)
Dept: INTERNAL MEDICINE CLINIC | Age: 72
End: 2022-07-22

## 2022-07-22 ENCOUNTER — HOSPITAL ENCOUNTER (OUTPATIENT)
Dept: LAB | Age: 72
Discharge: HOME | End: 2022-07-22
Payer: MEDICARE

## 2022-07-22 DIAGNOSIS — R73.9 HYPERGLYCEMIA: ICD-10-CM

## 2022-07-22 DIAGNOSIS — R79.82 ELEVATED C-REACTIVE PROTEIN (CRP): ICD-10-CM

## 2022-07-22 DIAGNOSIS — E78.5 HYPERLIPIDEMIA LDL GOAL <130: ICD-10-CM

## 2022-07-22 DIAGNOSIS — E06.3 HYPOTHYROIDISM DUE TO HASHIMOTO'S THYROIDITIS: ICD-10-CM

## 2022-07-22 DIAGNOSIS — E03.8 HYPOTHYROIDISM DUE TO HASHIMOTO'S THYROIDITIS: ICD-10-CM

## 2022-07-22 DIAGNOSIS — M25.50 ARTHRALGIA, UNSPECIFIED JOINT: ICD-10-CM

## 2022-07-22 DIAGNOSIS — E53.8 VITAMIN B12 DEFICIENCY: ICD-10-CM

## 2022-07-22 LAB
ALBUMIN SERPL-MCNC: 3.7 G/DL (ref 3.4–5)
ALBUMIN/GLOB SERPL: 1.1 {RATIO} (ref 0.8–1.7)
ALP SERPL-CCNC: 70 U/L (ref 45–117)
ALT SERPL-CCNC: 20 U/L (ref 13–56)
ANION GAP SERPL CALC-SCNC: 7 MMOL/L (ref 3–18)
AST SERPL-CCNC: 19 U/L (ref 10–38)
BASOPHILS # BLD: 0.1 K/UL (ref 0–0.1)
BASOPHILS NFR BLD: 1 % (ref 0–2)
BILIRUB SERPL-MCNC: 0.3 MG/DL (ref 0.2–1)
BUN SERPL-MCNC: 12 MG/DL (ref 7–18)
BUN/CREAT SERPL: 16 (ref 12–20)
CALCIUM SERPL-MCNC: 9.4 MG/DL (ref 8.5–10.1)
CHLORIDE SERPL-SCNC: 105 MMOL/L (ref 100–111)
CHOLEST SERPL-MCNC: 256 MG/DL
CO2 SERPL-SCNC: 28 MMOL/L (ref 21–32)
CREAT SERPL-MCNC: 0.73 MG/DL (ref 0.6–1.3)
CRP SERPL-MCNC: 1.5 MG/DL (ref 0–0.3)
DIFFERENTIAL METHOD BLD: ABNORMAL
EOSINOPHIL # BLD: 0.2 K/UL (ref 0–0.4)
EOSINOPHIL NFR BLD: 2 % (ref 0–5)
ERYTHROCYTE [DISTWIDTH] IN BLOOD BY AUTOMATED COUNT: 12.9 % (ref 11.6–14.5)
EST. AVERAGE GLUCOSE BLD GHB EST-MCNC: 120 MG/DL
GLOBULIN SER CALC-MCNC: 3.5 G/DL (ref 2–4)
GLUCOSE SERPL-MCNC: 102 MG/DL (ref 74–99)
HBA1C MFR BLD: 5.8 % (ref 4.2–5.6)
HCT VFR BLD AUTO: 40.3 % (ref 35–45)
HDLC SERPL-MCNC: 70 MG/DL (ref 40–60)
HDLC SERPL: 3.7 {RATIO} (ref 0–5)
HGB BLD-MCNC: 13.4 G/DL (ref 12–16)
IMM GRANULOCYTES # BLD AUTO: 0.1 K/UL (ref 0–0.04)
IMM GRANULOCYTES NFR BLD AUTO: 1 % (ref 0–0.5)
LDLC SERPL CALC-MCNC: 145.2 MG/DL (ref 0–100)
LIPID PROFILE,FLP: ABNORMAL
LYMPHOCYTES # BLD: 2.5 K/UL (ref 0.9–3.6)
LYMPHOCYTES NFR BLD: 28 % (ref 21–52)
MCH RBC QN AUTO: 31.7 PG (ref 24–34)
MCHC RBC AUTO-ENTMCNC: 33.3 G/DL (ref 31–37)
MCV RBC AUTO: 95.3 FL (ref 78–100)
MONOCYTES # BLD: 0.7 K/UL (ref 0.05–1.2)
MONOCYTES NFR BLD: 9 % (ref 3–10)
NEUTS SEG # BLD: 5.2 K/UL (ref 1.8–8)
NEUTS SEG NFR BLD: 60 % (ref 40–73)
NRBC # BLD: 0 K/UL (ref 0–0.01)
NRBC BLD-RTO: 0 PER 100 WBC
PLATELET # BLD AUTO: 396 K/UL (ref 135–420)
PMV BLD AUTO: 10 FL (ref 9.2–11.8)
POTASSIUM SERPL-SCNC: 3.9 MMOL/L (ref 3.5–5.5)
PROT SERPL-MCNC: 7.2 G/DL (ref 6.4–8.2)
RBC # BLD AUTO: 4.23 M/UL (ref 4.2–5.3)
SODIUM SERPL-SCNC: 140 MMOL/L (ref 136–145)
T4 FREE SERPL-MCNC: 1.5 NG/DL (ref 0.7–1.5)
TRIGL SERPL-MCNC: 204 MG/DL (ref ?–150)
TSH SERPL DL<=0.05 MIU/L-ACNC: 0.43 UIU/ML (ref 0.36–3.74)
VIT B12 SERPL-MCNC: 523 PG/ML (ref 211–911)
VLDLC SERPL CALC-MCNC: 40.8 MG/DL
WBC # BLD AUTO: 8.7 K/UL (ref 4.6–13.2)

## 2022-07-22 PROCEDURE — 86225 DNA ANTIBODY NATIVE: CPT

## 2022-07-22 PROCEDURE — 83520 IMMUNOASSAY QUANT NOS NONAB: CPT

## 2022-07-22 PROCEDURE — 82607 VITAMIN B-12: CPT

## 2022-07-22 PROCEDURE — 84439 ASSAY OF FREE THYROXINE: CPT

## 2022-07-22 PROCEDURE — 80053 COMPREHEN METABOLIC PANEL: CPT

## 2022-07-22 PROCEDURE — 86140 C-REACTIVE PROTEIN: CPT

## 2022-07-22 PROCEDURE — 83036 HEMOGLOBIN GLYCOSYLATED A1C: CPT

## 2022-07-22 PROCEDURE — 80061 LIPID PANEL: CPT

## 2022-07-22 PROCEDURE — 36415 COLL VENOUS BLD VENIPUNCTURE: CPT

## 2022-07-22 PROCEDURE — 85025 COMPLETE CBC W/AUTO DIFF WBC: CPT

## 2022-07-25 LAB
CENTROMERE B AB SER-ACNC: <0.2 AI (ref 0–0.9)
CHROMATIN AB SERPL-ACNC: 0.3 AI (ref 0–0.9)
DSDNA AB SER-ACNC: 31 IU/ML (ref 0–9)
ENA JO1 AB SER-ACNC: <0.2 AI (ref 0–0.9)
ENA RNP AB SER-ACNC: 0.4 AI (ref 0–0.9)
ENA SCL70 AB SER-ACNC: <0.2 AI (ref 0–0.9)
ENA SM AB SER-ACNC: <0.2 AI (ref 0–0.9)
ENA SS-A AB SER-ACNC: <0.2 AI (ref 0–0.9)
ENA SS-B AB SER-ACNC: <0.2 AI (ref 0–0.9)
SEE BELOW, 164869: ABNORMAL

## 2022-07-25 NOTE — PROGRESS NOTES
I will discuss results with her at her upcoming appointment. Her B12 level is normal.  Her CRP is elevated at 1.5. Her total cholesterol is down to 256 from 276. Her triglycerides are 204, up from 159. HDL is 70. Her LDL is down to 145 from 171. Her CMP and TFTs are normal.  Her A1c is 5.8 and unchanged. Her CBC is unremarkable.     Dr. Stacie Lira  [facility], [address] [place] [address], [address] [place] [address] Str.  Phone: [phone]  Fax: [phone]

## 2022-07-29 ENCOUNTER — OFFICE VISIT (OUTPATIENT)
Dept: INTERNAL MEDICINE CLINIC | Age: 72
End: 2022-07-29
Payer: MEDICARE

## 2022-07-29 VITALS
DIASTOLIC BLOOD PRESSURE: 82 MMHG | RESPIRATION RATE: 14 BRPM | HEART RATE: 81 BPM | TEMPERATURE: 98 F | BODY MASS INDEX: 35.28 KG/M2 | SYSTOLIC BLOOD PRESSURE: 131 MMHG | WEIGHT: 175 LBS | OXYGEN SATURATION: 96 % | HEIGHT: 59 IN

## 2022-07-29 DIAGNOSIS — E03.8 HYPOTHYROIDISM DUE TO HASHIMOTO'S THYROIDITIS: Primary | ICD-10-CM

## 2022-07-29 DIAGNOSIS — K75.81 NASH (NONALCOHOLIC STEATOHEPATITIS): ICD-10-CM

## 2022-07-29 DIAGNOSIS — E06.3 HYPOTHYROIDISM DUE TO HASHIMOTO'S THYROIDITIS: Primary | ICD-10-CM

## 2022-07-29 DIAGNOSIS — G89.29 CHRONIC SHOULDER PAIN, UNSPECIFIED LATERALITY: ICD-10-CM

## 2022-07-29 DIAGNOSIS — Z71.89 ADVANCE CARE PLANNING: ICD-10-CM

## 2022-07-29 DIAGNOSIS — M25.519 CHRONIC SHOULDER PAIN, UNSPECIFIED LATERALITY: ICD-10-CM

## 2022-07-29 DIAGNOSIS — Z12.31 ENCOUNTER FOR SCREENING MAMMOGRAM FOR BREAST CANCER: ICD-10-CM

## 2022-07-29 DIAGNOSIS — R73.9 HYPERGLYCEMIA: ICD-10-CM

## 2022-07-29 DIAGNOSIS — Z00.00 MEDICARE ANNUAL WELLNESS VISIT, SUBSEQUENT: ICD-10-CM

## 2022-07-29 DIAGNOSIS — E78.5 HYPERLIPIDEMIA LDL GOAL <130: ICD-10-CM

## 2022-07-29 DIAGNOSIS — R21 RASH: ICD-10-CM

## 2022-07-29 PROCEDURE — 3017F COLORECTAL CA SCREEN DOC REV: CPT | Performed by: INTERNAL MEDICINE

## 2022-07-29 PROCEDURE — G8427 DOCREV CUR MEDS BY ELIG CLIN: HCPCS | Performed by: INTERNAL MEDICINE

## 2022-07-29 PROCEDURE — G8399 PT W/DXA RESULTS DOCUMENT: HCPCS | Performed by: INTERNAL MEDICINE

## 2022-07-29 PROCEDURE — G8536 NO DOC ELDER MAL SCRN: HCPCS | Performed by: INTERNAL MEDICINE

## 2022-07-29 PROCEDURE — 1090F PRES/ABSN URINE INCON ASSESS: CPT | Performed by: INTERNAL MEDICINE

## 2022-07-29 PROCEDURE — 1101F PT FALLS ASSESS-DOCD LE1/YR: CPT | Performed by: INTERNAL MEDICINE

## 2022-07-29 PROCEDURE — G0439 PPPS, SUBSEQ VISIT: HCPCS | Performed by: INTERNAL MEDICINE

## 2022-07-29 PROCEDURE — G9899 SCRN MAM PERF RSLTS DOC: HCPCS | Performed by: INTERNAL MEDICINE

## 2022-07-29 PROCEDURE — G8417 CALC BMI ABV UP PARAM F/U: HCPCS | Performed by: INTERNAL MEDICINE

## 2022-07-29 PROCEDURE — 1123F ACP DISCUSS/DSCN MKR DOCD: CPT | Performed by: INTERNAL MEDICINE

## 2022-07-29 PROCEDURE — 99214 OFFICE O/P EST MOD 30 MIN: CPT | Performed by: INTERNAL MEDICINE

## 2022-07-29 PROCEDURE — G0463 HOSPITAL OUTPT CLINIC VISIT: HCPCS | Performed by: INTERNAL MEDICINE

## 2022-07-29 PROCEDURE — G8510 SCR DEP NEG, NO PLAN REQD: HCPCS | Performed by: INTERNAL MEDICINE

## 2022-07-29 RX ORDER — METHYLPREDNISOLONE 4 MG/1
TABLET ORAL
Qty: 1 DOSE PACK | Refills: 0 | Status: SHIPPED | OUTPATIENT
Start: 2022-07-29

## 2022-07-29 RX ORDER — LEVOTHYROXINE SODIUM 125 UG/1
125 TABLET ORAL
Qty: 90 TABLET | Refills: 3 | Status: SHIPPED | OUTPATIENT
Start: 2022-07-29

## 2022-07-29 NOTE — PROGRESS NOTES
Kari Gonzalez presents today for   Chief Complaint   Patient presents with    Follow-up    Labs     7-22-22    Annual Wellness Visit       1. \"Have you been to the ER, urgent care clinic since your last visit? Hospitalized since your last visit? \" no    2. \"Have you seen or consulted any other health care providers outside of the [address] [place] [address] since your last visit? \" yes     3. For patients aged 39-70: Has the patient had a colonoscopy / FIT/ Cologuard? Yes - no Care Gap present      If the patient is female:    4. For patients aged 41-77: Has the patient had a mammogram within the past 2 years? Yes - no Care Gap present  See top three    5. For patients aged 21-65: Has the patient had a pap smear?  Yes - no Care Gap present

## 2022-07-29 NOTE — PROGRESS NOTES
INTERNISTS OF [facility]:  07/29/22, [id number]      The Subsequent Medicare Annual Wellness Exam PROGRESS NOTE    This is a Subsequent Medicare Annual Wellness Exam (AWV). I have reviewed the patient's medical history in detail and updated the computerized patient record. Sushila Brooks is a 71 y.o.  female and presents for an annual wellness exam.    SUBJECTIVE    Past Medical History:   Diagnosis Date    Allergic rhinitis     Asthma     Diverticulosis     DJD (degenerative joint disease)     DJD (degenerative joint disease)     GERD (gastroesophageal reflux disease)     Headache(784.0)     Hyperlipidemia     Hypothyroidism     Overweight(278.02)     with weight gain    Right knee pain       Past Surgical History:   Procedure Laterality Date    ENDOSCOPY, COLON, DIAGNOSTIC      HX APPENDECTOMY      HX CHOLECYSTECTOMY      HX HYSTERECTOMY      HX KNEE ARTHROSCOPY      HX ORTHOPAEDIC      left knee    HX TONSILLECTOMY      HX TUBAL LIGATION      KY BREAST SURGERY PROCEDURE UNLISTED      reduction mammoplasty     Current Outpatient Medications   Medication Sig Dispense Refill    levothyroxine (SYNTHROID) 125 mcg tablet Take 1 Tablet by mouth Daily (before breakfast). 90 Tablet 3    estradioL (ESTRACE) 1 mg tablet Take 1 Tablet by mouth daily. 90 Tablet 3    pantoprazole (PROTONIX) 40 mg tablet Take 40 mg by mouth two (2) times a day. Allergies   Allergen Reactions    Atorvastatin Unknown (comments) and Other (comments)     Myalgia    Myalgia    Septra [Sulfamethoprim Ds] Shortness of Breath and Rash    Sulfa (Sulfonamide Antibiotics) Hives, Shortness of Breath and Other (comments)     respiratory distress    Oxycodone Itching and Other (comments)     Itching and difficulty swallowing \"my tongue felt real thick\".      Family History   Problem Relation Age of Onset    OSTEOARTHRITIS Mother     Heart Disease Mother     Diabetes Father     Cancer Father     OSTEOARTHRITIS Brother     Diabetes Brother      Social History     Tobacco Use    Smoking status: Never    Smokeless tobacco: Never   Substance Use Topics    Alcohol use: No     Patient Active Problem List   Diagnosis Code    Allergic rhinitis J30.9    Asthma J45.909    Venous insufficiency I87.2    Arthritis of left knee M17.12    Right knee pain M25.561    Hyperlipidemia LDL goal <130 E78.5    History of diverticulitis Z87.19    Gastroesophageal reflux disease without esophagitis K21.9    Hypothyroidism due to Hashimoto's thyroiditis E03.8, E06.3    Vitamin B12 deficiency E53.8    DYER (nonalcoholic steatohepatitis) K75.81    Menopausal symptoms N95.1    History of non anemic vitamin B12 deficiency Z86.39    Lumbar facet arthropathy M47.816    Osteopenia M85.80     The pt is a 70 yo female with h/o hypothyroidism, NAFLD, B12 deficiency, GERD, prediabetes, diverticulitis per EHR, s/p abx from left knee infection after a knee replacement surgery, OA, asthma, and allergic rhinitis. 1. Depression: She had a death every month from November of last yr to March of this yr. The hardest loss was from her niece. She walks her dog daily. \"That was my salvation. \" \"I\"m not angry at God anymore. \" \"I'm stress eating. \" No tobacco/ETOH use. 2. Hypothyroidism: Taking Synthroid 125mcg daily. Her recent TFTs are WNL. 3. HLD: Her recent labs show: Her total cholesterol is down to 256 from 276. Her triglycerides are 204, up from 159. HDL is 70. Her LDL is down to 145 from 171. She is not on a cholesterol lowering rx. +Statin intolerance. \"I don't want to be on any medication. \"     4. Prediabetes/NAFLD: No ETOH. Her CMP is WNL per recent labs. Her A1C is 5.8 and unchanged. 5. Health Maintenance:  - She does not have an advance directive. - She has isolated erythematous papules on her anterior chest. She was told it was from sun poisoning. 6. Left Shoulder Pain: She sees Orthopedics. She declines surgery for a rotator cuff tendinopathy.  She has full ROM along her left shoulder. Her CRP is 1.5 and elevated. Health Maintenance History  Immunizations reviewed: Tdap over-due   Pneumovax: up to date   Flu:  flu season is over  Zoster: over-due    Immunization History   Administered Date(s) Administered    COVID-19, PFIZER PURPLE top, DILUTE for use, (age 15 y+), IM, 30mcg/0.3mL 03/11/2021, 04/03/2021, 10/04/2021    Influenza High Dose Vaccine PF 10/04/2017, 10/07/2018, 09/02/2020    Influenza Vaccine 11/23/2016, 10/04/2018    Influenza Vaccine (Quad) PF (>6 Mo Flulaval, Fluarix, and >3 Yrs Afluria, Fluzone [id number]) 11/09/2015, 11/11/2016    Influenza Vaccine (Tri) Adjuvanted (>65 Yrs FLUAD TRI 09263) 10/07/2018, 09/30/2019, 09/17/2021    Influenza Vaccine PF 10/24/2013, 10/29/2014    Influenza Vaccine Split 10/11/2011, 10/19/2012    Influenza Vaccine Whole 09/24/2010    Pneumococcal Conjugate (PCV-13) 11/11/2016, 09/30/2019    Pneumococcal Polysaccharide (PPSV-23) 11/23/2016, 04/05/2018    TD Vaccine 04/21/2011    Zoster Vaccine, Live 10/24/2013       Colonoscopy: Up to date. Eye exam: Up to date. Mammo: Up to date. Dexascan: Up to date. Pelvic/Pap: No bleeding. Review of Systems   Constitutional:  Negative for chills and fever. HENT:  Negative for ear pain and sore throat. Eyes:  Negative for blurred vision and pain. Respiratory:  Negative for cough and shortness of breath. Cardiovascular:  Negative for chest pain. Gastrointestinal:  Negative for abdominal pain, blood in stool and melena. Genitourinary:  Negative for dysuria and hematuria. Musculoskeletal:  Positive for joint pain. Negative for myalgias. Skin:  Positive for rash. Neurological:  Negative for headaches. Endo/Heme/Allergies:  Does not bruise/bleed easily. Psychiatric/Behavioral:  Negative for substance abuse.       Depression Risk Factor Screening:      Patient Health Questionnaire (PHQ-2)   Over the last 2 weeks, how often have you been bothered by any of the following problems? Little interest or pleasure in doing things? Not at all. [0]  Feeling down, depressed, or hopeless? Not at all. [0]    Total Score: 0/6  PHQ-2 Assessment Scoring:   A score of 2 or more requires further screening with the PHQ-9    Alcohol Risk Factor Screening:   Women: On any occasion during the past 3 months, have you had more than 3 drinks containing alcohol? no    Do you average more than 7 drinks per week? no    Tobacco Use Screening:     Social History     Tobacco Use   Smoking Status Never   Smokeless Tobacco Never       Hearing Loss   There have been no changes to the pt's hearing. No additional studies/evaluation is warranted at this time    Activities of Daily Living   Self-care. Requires assistance with: no ADLs  She does not need help with ADLs/IADLs    Fall Risk   No falls w/I the past year. Abuse Screen   None    Additional Examination Findings:  Vitals:    07/29/22 1000 07/29/22 1006   BP: (!) 147/88 131/82   Pulse: 81    Resp: 14    Temp: 98 °F (36.7 °C)    TempSrc: Temporal    SpO2: 96%    Weight: 175 lb (79.4 kg)    Height: 4' 11\" (1.499 m)    PainSc:   0 - No pain       Body mass index is 35.35 kg/m². Evaluation of Cognitive Function:  Mood/affect: Euthymic  Appearance: Well-groomed  Family member/caregiver input: She is not with a family member      General:   Well-nourished, well-groomed, pleasant, alert, in no acute distress.      Head:  Normocephalic, atraumatic  Ears:  External ears WNL  Eyes:  Clear sclera  Neuro:   Alert, conversant, appropriate, following commands  Skin:    No rashes noted  Psych: Affect, mood and judgment appropriate      Dementia Screen:  Clock Drawing (ten past eleven) Exercise: Unremarkable      LABS   Data Review:   Lab Results   Component Value Date/Time    Sodium 140 07/22/2022 09:15 AM    Potassium 3.9 07/22/2022 09:15 AM    Chloride 105 07/22/2022 09:15 AM    CO2 28 07/22/2022 09:15 AM    Anion gap 7 07/22/2022 09:15 AM    Glucose 102 (H) 07/22/2022 09:15 AM    BUN 12 07/22/2022 09:15 AM    Creatinine 0.73 07/22/2022 09:15 AM    BUN/Creatinine ratio 16 07/22/2022 09:15 AM    GFR est AA >60 07/22/2022 09:15 AM    GFR est non-AA >60 07/22/2022 09:15 AM    Calcium 9.4 07/22/2022 09:15 AM       Lab Results   Component Value Date/Time    WBC 8.7 07/22/2022 09:15 AM    HGB 13.4 07/22/2022 09:15 AM    HCT 40.3 07/22/2022 09:15 AM    PLATELET 397 11/14/2605 09:15 AM    MCV 95.3 07/22/2022 09:15 AM       Lab Results   Component Value Date/Time    Hemoglobin A1c 5.8 (H) 07/22/2022 09:15 AM       Lab Results   Component Value Date/Time    Cholesterol, total 256 (H) 07/22/2022 09:15 AM    HDL Cholesterol 70 (H) 07/22/2022 09:15 AM    LDL, calculated 145.2 (H) 07/22/2022 09:15 AM    VLDL, calculated 40.8 07/22/2022 09:15 AM    Triglyceride 204 (H) 07/22/2022 09:15 AM    CHOL/HDL Ratio 3.7 07/22/2022 09:15 AM         Patient Care Team:  aCitlin Nath MD as PCP - General (Family Medicine)  Caitlin Nath MD as PCP - [address] [place] [address] Provider  Moris Goldsmith MD (Ophthalmology)  Rosa Dorsey MD (Ophthalmology)  Boris Doran MD (Orthopedic Surgery)    End-of-life planning  Advanced Directive in the case than an injury or illness causes the patient to be unable to make health care decisions was discussed with the patient. Advice/Referrals/Counselling/Plan:   Education and counseling provided:  Are appropriate based on today's review and evaluation  End-of-Life planning (with patient's consent)  Pneumococcal Vaccine  Influenza Vaccine  Screening Mammography  Screening Pap and pelvic (covered once every 2 years)  Colorectal cancer screening tests  Cardiovascular screening blood test  Bone mass measurement (DEXA)  Screening for glaucoma  Diabetes screening test  Include in education list (weight loss, physical activity, smoking cessation, fall prevention, and nutrition)    ICD-10-CM ICD-9-CM    1. Hypothyroidism due to Hashimoto's thyroiditis  E03.8 244.8 levothyroxine (SYNTHROID) 125 mcg tablet    E06.3 245.2       2. Encounter for screening mammogram for breast cancer  Z12.31 V76.12 PEEWEE 3D MILEY W MAMMO BI SCREENING INCL CAD      3. DYER (nonalcoholic steatohepatitis)  K75.81 571.8 LIPID PANEL      DYER FIBROSURE      4. Hyperglycemia  R73.9 790.29 HEMOGLOBIN A1C WITH EAG      5. Hyperlipidemia LDL goal <130  E78.5 272.4 LIPID PANEL      6. Rash  R21 782.1 methylPREDNISolone (MEDROL DOSEPACK) 4 mg tablet        reviewed diet, exercise and weight control. Brief written plan, checklist    Assessment/Plan:    Health Maintenance:  - I encouraged her to get all recommended vaccinations/screenings        Lab review: labs are reviewed in the [address] [place] [address] (ACP) Provider Conversation     Date of ACP Conversation: 07/29/22  Persons included in Conversation:  patient    Authorized Decision Maker (if patient is incapable of making informed decisions): This person is:   Named in Advance Directive or Healthcare Power of           For Patients with Decision Making Capacity:   Values/Goals: Exploration of values, goals, and preferences if recovery is not expected, even with continued medical treatment in the event of:  Imminent death  Severe, permanent brain injury    Conversation Outcomes / Follow-Up Plan:   ACP complete - no further action today. She has no changes to make to her preexisting advance directive. I have discussed the diagnosis with the patient and the intended plan as seen in the above orders. The patient has received an after-visit summary and questions were answered concerning future plans. I have discussed medication side effects and warnings with the patient as well. I have reviewed the plan of care with the patient, accepted their input and they are in agreement with the treatment goals.

## 2022-08-03 ENCOUNTER — TELEPHONE (OUTPATIENT)
Dept: INTERNAL MEDICINE CLINIC | Age: 72
End: 2022-08-03

## 2022-08-03 NOTE — TELEPHONE ENCOUNTER
[facility] calling, says the Mammo was supposed to go to [facility]. [employer] is calling pt and she is upset that it wasn't routed to correct faciltiy. Please send. She did not have fax number.

## 2022-08-03 NOTE — TELEPHONE ENCOUNTER
Returned call to patient, patient reminded that we I faxed the order to [facility] on 7-29-22 same day as the order was put in. I also received a confirmation of the fax. Patient given the number to schedule with [facility]. Apologized to the patient for receiving the call from our department, but reassured her that I did fax it on 7-29-22.

## 2022-08-08 NOTE — PROGRESS NOTES
INTERNISTS OF [facility]:  8/7/2022, MRN: [id number]      Hilario Rea is a 71 y.o. female and presents to clinic for Follow-up, Labs (7-22-22), and Annual Wellness Visit      Subjective: The pt is a 70 yo female with h/o hypothyroidism, NAFLD, B12 deficiency, GERD, prediabetes, diverticulitis per EHR, s/p abx from left knee infection after a knee replacement surgery, OA, asthma, and allergic rhinitis. 1. Depression: She had a death every month from November of last yr to March of this yr. The hardest loss was from her niece. She walks her dog daily. \"That was my salvation. \" \"I\"m not angry at God anymore. \" \"I'm stress eating. \" No tobacco/ETOH use. 2. Hypothyroidism: Taking Synthroid 125mcg daily. Her recent TFTs are WNL. 3. HLD: Her recent labs show: Her total cholesterol is down to 256 from 276. Her triglycerides are 204, up from 159. HDL is 70. Her LDL is down to 145 from 171. She is not on a cholesterol lowering rx. +Statin intolerance. \"I don't want to be on any medication. \"     4. Prediabetes-Hyperglycemia/NAFLD: No ETOH. Her CMP is WNL per recent labs. Her A1C is 5.8 and unchanged. 5. Health Maintenance:  - She does not have an advance directive. 6. Rash:  She has isolated rash on her anterior chest. She was told it was from sun poisoning. 7. Left Shoulder Pain: She sees Orthopedics. She declines surgery for a rotator cuff tendinopathy. She has full ROM along her left shoulder. Her CRP is 1.5 and elevated.          Patient Active Problem List    Diagnosis Date Noted    Osteopenia 08/31/2021    DYER (nonalcoholic steatohepatitis) 08/21/2020    Menopausal symptoms 08/21/2020    History of non anemic vitamin B12 deficiency 08/21/2020    Lumbar facet arthropathy 08/21/2020    Vitamin B12 deficiency 08/14/2017    Gastroesophageal reflux disease without esophagitis 02/14/2017    Hypothyroidism due to Hashimoto's thyroiditis 02/14/2017    History of diverticulitis 10/13/2016 Hyperlipidemia LDL goal <130 05/11/2016    Right knee pain     Arthritis of left knee 04/29/2015    Venous insufficiency 09/09/2013    Allergic rhinitis     Asthma        Current Outpatient Medications   Medication Sig Dispense Refill    levothyroxine (SYNTHROID) 125 mcg tablet Take 1 Tablet by mouth Daily (before breakfast). 90 Tablet 3    methylPREDNISolone (MEDROL DOSEPACK) 4 mg tablet Take per package instructions if a rash develops on the chest 1 Dose Pack 0    estradioL (ESTRACE) 1 mg tablet Take 1 Tablet by mouth daily. 90 Tablet 3    pantoprazole (PROTONIX) 40 mg tablet Take 40 mg by mouth two (2) times a day. Allergies   Allergen Reactions    Atorvastatin Unknown (comments) and Other (comments)     Myalgia    Myalgia    Septra [Sulfamethoprim Ds] Shortness of Breath and Rash    Sulfa (Sulfonamide Antibiotics) Hives, Shortness of Breath and Other (comments)     respiratory distress    Oxycodone Itching and Other (comments)     Itching and difficulty swallowing \"my tongue felt real thick\".        Past Medical History:   Diagnosis Date    Allergic rhinitis     Asthma     Diverticulosis     DJD (degenerative joint disease)     DJD (degenerative joint disease)     GERD (gastroesophageal reflux disease)     Headache(784.0)     Hyperlipidemia     Hypothyroidism     Overweight(278.02)     with weight gain    Right knee pain        Past Surgical History:   Procedure Laterality Date    ENDOSCOPY, COLON, DIAGNOSTIC      HX APPENDECTOMY      HX CHOLECYSTECTOMY      HX HYSTERECTOMY      HX KNEE ARTHROSCOPY      HX ORTHOPAEDIC      left knee    HX TONSILLECTOMY      HX TUBAL LIGATION      VA BREAST SURGERY PROCEDURE UNLISTED      reduction mammoplasty       Family History   Problem Relation Age of Onset    OSTEOARTHRITIS Mother     Heart Disease Mother     Diabetes Father     Cancer Father     OSTEOARTHRITIS Brother     Diabetes Brother        Social History     Tobacco Use    Smoking status: Never    Smokeless tobacco: Never   Substance Use Topics    Alcohol use: No       ROS   Review of Systems   Constitutional:  Negative for chills and fever. HENT:  Negative for ear pain and sore throat. Eyes:  Negative for blurred vision and pain. Respiratory:  Negative for cough and shortness of breath. Cardiovascular:  Negative for chest pain. Gastrointestinal:  Negative for abdominal pain, blood in stool and melena. Genitourinary:  Negative for dysuria and hematuria. Musculoskeletal:  Positive for joint pain. Negative for myalgias. Skin:  Positive for rash. Neurological:  Negative for headaches. Endo/Heme/Allergies:  Does not bruise/bleed easily. Psychiatric/Behavioral:  Negative for substance abuse. Nervous/anxious: stress off/on. Objective     Vitals:    07/29/22 1000 07/29/22 1006   BP: (!) 147/88 131/82   Pulse: 81    Resp: 14    Temp: 98 °F (36.7 °C)    TempSrc: Temporal    SpO2: 96%    Weight: 175 lb (79.4 kg)    Height: 4' 11\" (1.499 m)    PainSc:   0 - No pain        Physical Exam  Vitals and nursing note reviewed. HENT:      Head: Normocephalic and atraumatic. Right Ear: External ear normal.      Left Ear: External ear normal.   Eyes:      General: No scleral icterus. Right eye: No discharge. Left eye: No discharge. Conjunctiva/sclera: Conjunctivae normal.   Cardiovascular:      Rate and Rhythm: Normal rate and regular rhythm. Heart sounds: Normal heart sounds. No murmur heard. No friction rub. No gallop. Pulmonary:      Effort: Pulmonary effort is normal. No respiratory distress. Breath sounds: Normal breath sounds. No wheezing or rales. Abdominal:      General: Bowel sounds are normal. There is no distension. Palpations: Abdomen is soft. There is no mass. Tenderness: There is no abdominal tenderness. There is no guarding or rebound. Musculoskeletal:         General: No swelling (BUE) or tenderness (BUE). Cervical back: Neck supple. Lymphadenopathy:      Cervical: No cervical adenopathy. Skin:     General: Skin is warm and dry. Findings: Rash (erythematous maculopapular rash is present along her anterior chest) present. No erythema. Neurological:      Mental Status: She is alert. Motor: No abnormal muscle tone. Gait: Gait normal.   Psychiatric:         Mood and Affect: Mood normal.       LABS   Data Review:   Lab Results   Component Value Date/Time    WBC 8.7 07/22/2022 09:15 AM    HGB 13.4 07/22/2022 09:15 AM    HCT 40.3 07/22/2022 09:15 AM    PLATELET 221 11/62/0658 09:15 AM    MCV 95.3 07/22/2022 09:15 AM       Lab Results   Component Value Date/Time    Sodium 140 07/22/2022 09:15 AM    Potassium 3.9 07/22/2022 09:15 AM    Chloride 105 07/22/2022 09:15 AM    CO2 28 07/22/2022 09:15 AM    Anion gap 7 07/22/2022 09:15 AM    Glucose 102 (H) 07/22/2022 09:15 AM    BUN 12 07/22/2022 09:15 AM    Creatinine 0.73 07/22/2022 09:15 AM    BUN/Creatinine ratio 16 07/22/2022 09:15 AM    GFR est AA >60 07/22/2022 09:15 AM    GFR est non-AA >60 07/22/2022 09:15 AM    Calcium 9.4 07/22/2022 09:15 AM       Lab Results   Component Value Date/Time    Cholesterol, total 256 (H) 07/22/2022 09:15 AM    HDL Cholesterol 70 (H) 07/22/2022 09:15 AM    LDL, calculated 145.2 (H) 07/22/2022 09:15 AM    VLDL, calculated 40.8 07/22/2022 09:15 AM    Triglyceride 204 (H) 07/22/2022 09:15 AM    CHOL/HDL Ratio 3.7 07/22/2022 09:15 AM       Lab Results   Component Value Date/Time    Hemoglobin A1c 5.8 (H) 07/22/2022 09:15 AM       Assessment/Plan:   1. Hypothyroidism due to Hashimoto's thyroiditis: Her TFTs are WNL.   - C/w Synthyroid as prescribed. Refilling her rx. ORDERS:  - levothyroxine (SYNTHROID) 125 mcg tablet; Take 1 Tablet by mouth Daily (before breakfast). Dispense: 90 Tablet; Refill: 3    2. DYER (nonalcoholic steatohepatitis) and Prediabetes/Hyperglycemia: Her A1C is 5.8 - unchanged.   +HLD   - I encouraged her to reduce her weight by limiting her carb intake. - Checking labs    ORDERS:  - LIPID PANEL; Future  - DYER FIBROSURE; Future  - HEMOGLOBIN A1C WITH EAG; Future    3. Rash: Along her anterior chest. From a contact dermatitis? - Medrol dose pack ordered    ORDERS:  - methylPREDNISolone (MEDROL DOSEPACK) 4 mg tablet; Take per package instructions if a rash develops on the chest  Dispense: 1 Dose Pack; Refill: 0    4. Left Shoulder Pain: Unchanged. - Activity as tolerated for now. - Ok to try OTC rx as needed. Health Maintenance Due   Topic Date Due    Shingrix Vaccine Age 49> (1 of 2) Never done    COVID-19 Vaccine (4 - Booster for Pfizer series) 02/04/2022    Medicare Yearly Exam  07/29/2022         Lab review: labs are reviewed in the EHR and ordered as mentioned above    I have discussed the diagnosis with the patient and the intended plan as seen in the above orders. The patient has received an after-visit summary and questions were answered concerning future plans. I have discussed medication side effects and warnings with the patient as well. I have reviewed the plan of care with the patient, accepted their input and they are in agreement with the treatment goals. All questions were answered. The patient understands the plan of care. Handouts provided today with above information. Pt instructed if symptoms worsen to call the office or report to the ED for continued care. Greater than 50% of the visit time was spent in counseling and/or coordination of care. Voice recognition was used to generate this report, which may have resulted in some phonetic based errors in grammar and contents. Even though attempts were made to correct all the mistakes, some may have been missed, and remained in the body of the document. Follow-up and Dispositions    Return in about 5 months (around 12/14/2022).          Emmy Page MD

## 2022-08-08 NOTE — PATIENT INSTRUCTIONS

## 2022-08-08 NOTE — ACP (ADVANCE CARE PLANNING)
Advance Care Planning  Advance Care Planning (ACP) Provider Conversation     Date of ACP Conversation: 07/29/22  Persons included in Conversation:  patient    Authorized Decision Maker (if patient is incapable of making informed decisions): This person is:   Named in Advance Directive or Healthcare Power of           For Patients with Decision Making Capacity:   Values/Goals: Exploration of values, goals, and preferences if recovery is not expected, even with continued medical treatment in the event of:  Imminent death  Severe, permanent brain injury    Conversation Outcomes / Follow-Up Plan:   ACP complete - no further action today. She has no changes to make to her preexisting advance directive.     Dr. Jay Levine  [facility], [address] [place] [address], [address] [place] [address] Str.  Phone: [phone]  Fax: [phone]

## 2022-08-11 DIAGNOSIS — M79.641 BILATERAL HAND PAIN: ICD-10-CM

## 2022-08-11 DIAGNOSIS — E03.8 HYPOTHYROIDISM DUE TO HASHIMOTO'S THYROIDITIS: ICD-10-CM

## 2022-08-11 DIAGNOSIS — R76.8 POSITIVE ANA (ANTINUCLEAR ANTIBODY): Primary | ICD-10-CM

## 2022-08-11 DIAGNOSIS — M79.642 BILATERAL HAND PAIN: ICD-10-CM

## 2022-08-11 DIAGNOSIS — R79.82 ELEVATED C-REACTIVE PROTEIN (CRP): ICD-10-CM

## 2022-08-11 DIAGNOSIS — E06.3 HYPOTHYROIDISM DUE TO HASHIMOTO'S THYROIDITIS: ICD-10-CM

## 2022-08-11 NOTE — PROGRESS NOTES
I spoke to her about her results. Placing a referral to Rheumatology.     Dr. Sarah Tyson  [facility], [facility], [address] [place] [address] Str.  Phone: [phone]  Fax: [phone]

## 2022-08-15 ENCOUNTER — PATIENT MESSAGE (OUTPATIENT)
Dept: INTERNAL MEDICINE CLINIC | Age: 72
End: 2022-08-15

## 2022-08-15 NOTE — TELEPHONE ENCOUNTER
----- Message from [facility] sent at 8/15/2022  9:09 AM EDT -----  Regarding: rt wrist and hand pain  Friday knuckles on both hands sore and fingers stiff.   Saturday evening the rt  wrist very painful  On a scale of 1-10 for pain it was a 10+  Last night the [address] was very swollen;;en  I have taken Aleve, ASA, and Ibuprofen  I have used Aspercreme, voltarin,biofreesw,icy hot,and benga with no relief,  I have use cold packs and heat packs  You are suppose to be setting me up with Rheumatology  Could you get me an appt ASAP  My phone # is [phone]

## 2022-08-16 RX ORDER — MELOXICAM 7.5 MG/1
7.5 TABLET ORAL
Qty: 30 TABLET | Refills: 5 | Status: SHIPPED | OUTPATIENT
Start: 2022-08-16

## 2022-08-22 DIAGNOSIS — Z12.31 ENCOUNTER FOR SCREENING MAMMOGRAM FOR BREAST CANCER: ICD-10-CM

## 2022-11-12 LAB — CREATININE, EXTERNAL: 0.72

## 2022-12-19 NOTE — TELEPHONE ENCOUNTER
Has she weaned off of this rx (estrace)? Please confirm. I was under the impression that she was planning to wean off of this rx.      Dr. Gonzalez Apo  [facility] [address], [facility]  Phone: [phone]  Fax: [phone]

## 2022-12-21 RX ORDER — ESTRADIOL 1 MG/1
1 TABLET ORAL DAILY
Qty: 90 TABLET | Refills: 3 | Status: SHIPPED | OUTPATIENT
Start: 2022-12-21

## 2022-12-21 NOTE — TELEPHONE ENCOUNTER
Pt called checking on the status of her med refill request , I gave her message below , patient said she tried stopping Rx estrace for 9 months , but was miserable , she started taking it again . And is now down to her last pill today .  She is asking for it to please be refilled today

## 2022-12-28 ENCOUNTER — APPOINTMENT (OUTPATIENT)
Dept: INTERNAL MEDICINE CLINIC | Age: 72
End: 2022-12-28

## 2022-12-28 ENCOUNTER — HOSPITAL ENCOUNTER (OUTPATIENT)
Dept: LAB | Age: 72
Discharge: HOME | End: 2022-12-28
Payer: MEDICARE

## 2022-12-28 DIAGNOSIS — E78.5 HYPERLIPIDEMIA LDL GOAL <130: ICD-10-CM

## 2022-12-28 DIAGNOSIS — R73.9 HYPERGLYCEMIA: ICD-10-CM

## 2022-12-28 DIAGNOSIS — K75.81 NASH (NONALCOHOLIC STEATOHEPATITIS): ICD-10-CM

## 2022-12-28 LAB
CHOLEST SERPL-MCNC: 263 MG/DL
EST. AVERAGE GLUCOSE BLD GHB EST-MCNC: 123 MG/DL
HBA1C MFR BLD: 5.9 % (ref 4.2–5.6)
HDLC SERPL-MCNC: 76 MG/DL (ref 40–60)
HDLC SERPL: 3.5 {RATIO} (ref 0–5)
LDLC SERPL CALC-MCNC: 156.2 MG/DL (ref 0–100)
LIPID PROFILE,FLP: ABNORMAL
TRIGL SERPL-MCNC: 154 MG/DL (ref ?–150)
VLDLC SERPL CALC-MCNC: 30.8 MG/DL

## 2022-12-28 PROCEDURE — 83036 HEMOGLOBIN GLYCOSYLATED A1C: CPT

## 2022-12-28 PROCEDURE — 84450 TRANSFERASE (AST) (SGOT): CPT

## 2022-12-28 PROCEDURE — 80061 LIPID PANEL: CPT

## 2022-12-28 PROCEDURE — 36415 COLL VENOUS BLD VENIPUNCTURE: CPT

## 2022-12-30 NOTE — PROGRESS NOTES
I will let her know at her upcoming apt that her A1C is 5.9, up from 5.8. Her total cholesterol is 263, up from 256. Triglycerides are 154. HDL is 76. LDL is 156.      Dr. Aguilar Guard  [facility], [address] [place] [address], [facility] [address]  Phone: [phone]  Fax: [phone]

## 2022-12-31 LAB
A2 MACROGLOB SERPL-MCNC: 152 MG/DL (ref 110–276)
ALT (SGPT) P5P, 001547: 13 IU/L (ref 0–40)
APO A-I SERPL-MCNC: 213 MG/DL (ref 116–209)
AST SERPL W P-5'-P-CCNC: 23 IU/L (ref 0–40)
BILIRUB SERPL-MCNC: 0.3 MG/DL (ref 0–1.2)
CHOLEST SERPL-MCNC: 261 MG/DL (ref 100–199)
COMMENT:: ABNORMAL
FIBROSIS SCORING:, 550107: ABNORMAL
FIBROSIS STAGE SERPL QL: ABNORMAL
GGT SERPL-CCNC: 19 IU/L (ref 0–60)
GLUCOSE SERPL-MCNC: 99 MG/DL (ref 70–99)
HAPTOGLOB SERPL-MCNC: 177 MG/DL (ref 42–346)
HEIGHT (NASH), 13912: 59
INTERPRETATIONS:, 550143: ABNORMAL
LIVER FIBR SCORE SERPL CALC.FIBROSURE: 0.05 (ref 0–0.21)
NASH SCORING, 550144: ABNORMAL
NECROINFLAMMATORY ACT GRADE SERPL QL: ABNORMAL
NECROINFLAMMATORY ACT SCORE SERPL: 0.5
SERVICE CMNT-IMP: ABNORMAL
STEATOSIS GRADE, 550153: ABNORMAL
STEATOSIS GRADING, 550189: ABNORMAL
STEATOSIS SCORE, 550149: 0.43 (ref 0–0.3)
TRIGL SERPL-MCNC: 154 MG/DL (ref 0–149)
WEIGHT (NASH): 175

## 2023-01-02 NOTE — PROGRESS NOTES
I will discuss her results with her at her upcoming appointment. Her steatosis score is 0.43 for her FibroSure test.  Her Rafia Frankie score is 0.5. Her A1C is 5.9, up from 5.8. Her total cholesterol is 263, up from 256. Triglycerides are 154. HDL is 76.  LDL is 156.      Dr. Caren Pack  [facility], [address] [place] [address], [address] [place] [address] Str[address]  Phone: [phone]  Fax: [phone]

## 2023-01-23 NOTE — PROGRESS NOTES
Shira Kaba presents today for   Chief Complaint   Patient presents with    Follow-up    Labs     12-28-22       1. \"Have you been to the ER, urgent care clinic since your last visit? Hospitalized since your last visit? \" no    2. \"Have you seen or consulted any other health care providers outside of the [address] [place] [address] since your last visit? \" yes     3. For patients aged 39-70: Has the patient had a colonoscopy / FIT/ Cologuard? Yes - Care Gap present. Most recent result on file      If the patient is female:    4. For patients aged 41-77: Has the patient had a mammogram within the past 2 years? Yes - no Care Gap present  See top three    5. For patients aged 21-65: Has the patient had a pap smear?  NA - based on age or sex

## 2023-01-24 ENCOUNTER — OFFICE VISIT (OUTPATIENT)
Dept: INTERNAL MEDICINE CLINIC | Age: 73
End: 2023-01-24
Payer: MEDICARE

## 2023-01-24 VITALS
SYSTOLIC BLOOD PRESSURE: 133 MMHG | DIASTOLIC BLOOD PRESSURE: 78 MMHG | OXYGEN SATURATION: 97 % | HEART RATE: 86 BPM | HEIGHT: 59 IN | WEIGHT: 178 LBS | RESPIRATION RATE: 14 BRPM | BODY MASS INDEX: 35.88 KG/M2 | TEMPERATURE: 98.5 F

## 2023-01-24 DIAGNOSIS — E78.5 HYPERLIPIDEMIA LDL GOAL <130: ICD-10-CM

## 2023-01-24 DIAGNOSIS — F43.9 STRESS: ICD-10-CM

## 2023-01-24 DIAGNOSIS — E03.8 HYPOTHYROIDISM DUE TO HASHIMOTO'S THYROIDITIS: Primary | ICD-10-CM

## 2023-01-24 DIAGNOSIS — E06.3 HYPOTHYROIDISM DUE TO HASHIMOTO'S THYROIDITIS: Primary | ICD-10-CM

## 2023-01-24 DIAGNOSIS — R73.9 HYPERGLYCEMIA: ICD-10-CM

## 2023-01-24 DIAGNOSIS — K75.81 NASH (NONALCOHOLIC STEATOHEPATITIS): ICD-10-CM

## 2023-01-24 DIAGNOSIS — E66.01 SEVERE OBESITY (BMI 35.0-39.9) WITH COMORBIDITY (HCC): ICD-10-CM

## 2023-01-24 DIAGNOSIS — M79.643 PAIN OF HAND, UNSPECIFIED LATERALITY: ICD-10-CM

## 2023-01-24 DIAGNOSIS — R13.10 DYSPHAGIA, UNSPECIFIED TYPE: ICD-10-CM

## 2023-01-24 PROCEDURE — G9899 SCRN MAM PERF RSLTS DOC: HCPCS | Performed by: INTERNAL MEDICINE

## 2023-01-24 PROCEDURE — G0463 HOSPITAL OUTPT CLINIC VISIT: HCPCS | Performed by: INTERNAL MEDICINE

## 2023-01-24 PROCEDURE — 99214 OFFICE O/P EST MOD 30 MIN: CPT | Performed by: INTERNAL MEDICINE

## 2023-01-24 PROCEDURE — 1123F ACP DISCUSS/DSCN MKR DOCD: CPT | Performed by: INTERNAL MEDICINE

## 2023-01-24 PROCEDURE — 1101F PT FALLS ASSESS-DOCD LE1/YR: CPT | Performed by: INTERNAL MEDICINE

## 2023-01-24 PROCEDURE — G8432 DEP SCR NOT DOC, RNG: HCPCS | Performed by: INTERNAL MEDICINE

## 2023-01-24 PROCEDURE — 3017F COLORECTAL CA SCREEN DOC REV: CPT | Performed by: INTERNAL MEDICINE

## 2023-01-24 PROCEDURE — G8399 PT W/DXA RESULTS DOCUMENT: HCPCS | Performed by: INTERNAL MEDICINE

## 2023-01-24 PROCEDURE — G8536 NO DOC ELDER MAL SCRN: HCPCS | Performed by: INTERNAL MEDICINE

## 2023-01-24 PROCEDURE — G8417 CALC BMI ABV UP PARAM F/U: HCPCS | Performed by: INTERNAL MEDICINE

## 2023-01-24 PROCEDURE — G8427 DOCREV CUR MEDS BY ELIG CLIN: HCPCS | Performed by: INTERNAL MEDICINE

## 2023-01-24 PROCEDURE — 1090F PRES/ABSN URINE INCON ASSESS: CPT | Performed by: INTERNAL MEDICINE

## 2023-01-24 RX ORDER — EZETIMIBE 10 MG/1
10 TABLET ORAL DAILY
Qty: 30 TABLET | Refills: 3 | Status: SHIPPED | OUTPATIENT
Start: 2023-01-24

## 2023-01-24 NOTE — PROGRESS NOTES
INTERNISTS OF [facility]:  2023, MRN: [id number]      Chela Miner is a 71 y.o. female and presents to clinic for Follow-up and Labs (22)      Subjective: The pt is a 70 yo female with h/o hypothyroidism, NAFLD, B12 deficiency, GERD, prediabetes, diverticulitis per EHR, s/p abx from left knee infection after a knee replacement surgery, OA, pseudogout, asthma, and allergic rhinitis. 1. Depression: \"My family will not be the way it was before my mother . \" She has accepted this. Her great niece who was 10yo was shot in the head after she was brought to an illegal gun deal by her father. She feels like she is nevertheless in a \"good place. \"    2. Hypothyroidism: She will need labs in 6 months. On Synthroid 125mcg daily. Her TSH was WNL but her fT4 was mildly elevated when checked by  in November. She wants to wait to check TFTs until this summer    3. HLD and NAFLD: Her recent labs show:  Her steatosis score is 0.43 for her FibroSure test.  Her Rolm Page score is 0.5. Her total cholesterol is 263, up from 256. Triglycerides are 154. HDL is 76. LDL is 156.     4. Prediabetes and Obesity: Her A1C is up to 5.9 from 5.8. She is stress eating less. 5. Hand Pain: She met with  with Rheumatology. She was told she does not have lupus but likely has pseudogout. She was prescribed colchicine. It was expensive, did not help to relieve her pain, and caused severe diarrhea. She was wearing disposable briefs before stopping the rx. \"I'll deal with the pain. \" She is scheduled with him next month. 6. Dysphagia: S/p dilation in the past. Scheduled to see her GI team next month. She has had issues with eating bread and meat.        ***1. Depression: She had a death every month from November of last yr to March of this yr. The hardest loss was from her niece. She walks her dog daily. \"That was my salvation. \" \"I\"m not angry at God anymore. \" \"I'm stress eating. \" No tobacco/ETOH use.       2. Hypothyroidism: Taking Synthroid 125mcg daily. Her recent TFTs are WNL. 3. HLD: Her recent labs show: Her total cholesterol is down to 256 from 276. Her triglycerides are 204, up from 159. HDL is 70. Her LDL is down to 145 from 171. She is not on a cholesterol lowering rx. +Statin intolerance. \"I don't want to be on any medication. \"      4. Prediabetes-Hyperglycemia/NAFLD: No ETOH. Her CMP is WNL per recent labs. Her A1C is 5.8 and unchanged. 5. Health Maintenance:  - She does not have an advance directive. 6. Rash:  She has isolated rash on her anterior chest. She was told it was from sun poisoning. 7. Left Shoulder Pain: She sees Orthopedics. She declines surgery for a rotator cuff tendinopathy. She has full ROM along her left shoulder. Her CRP is 1.5 and elevated. Patient Active Problem List    Diagnosis Date Noted    Osteopenia 08/31/2021    DYER (nonalcoholic steatohepatitis) 08/21/2020    Menopausal symptoms 08/21/2020    History of non anemic vitamin B12 deficiency 08/21/2020    Lumbar facet arthropathy 08/21/2020    Vitamin B12 deficiency 08/14/2017    Gastroesophageal reflux disease without esophagitis 02/14/2017    Hypothyroidism due to Hashimoto's thyroiditis 02/14/2017    History of diverticulitis 10/13/2016    Hyperlipidemia LDL goal <130 05/11/2016    Right knee pain     Arthritis of left knee 04/29/2015    Venous insufficiency 09/09/2013    Allergic rhinitis     Asthma        Current Outpatient Medications   Medication Sig Dispense Refill    estradioL (ESTRACE) 1 mg tablet Take 1 Tablet by mouth daily. 90 Tablet 3    levothyroxine (SYNTHROID) 125 mcg tablet Take 1 Tablet by mouth Daily (before breakfast). 90 Tablet 3    pantoprazole (PROTONIX) 40 mg tablet Take 40 mg by mouth two (2) times a day. meloxicam (MOBIC) 7.5 mg tablet Take 1 Tablet by mouth daily as needed for Pain (hand pain).  30 Tablet 5       Allergies   Allergen Reactions    Atorvastatin Unknown (comments) and Other (comments)     Myalgia    Myalgia    Septra [Sulfamethoprim Ds] Shortness of Breath and Rash    Sulfa (Sulfonamide Antibiotics) Hives, Shortness of Breath and Other (comments)     respiratory distress    Oxycodone Itching and Other (comments)     Itching and difficulty swallowing \"my tongue felt real thick\".        Past Medical History:   Diagnosis Date    Allergic rhinitis     Asthma     Diverticulosis     DJD (degenerative joint disease)     DJD (degenerative joint disease)     GERD (gastroesophageal reflux disease)     Headache(784.0)     Hyperlipidemia     Hypothyroidism     Overweight(278.02)     with weight gain    Right knee pain        Past Surgical History:   Procedure Laterality Date    ENDOSCOPY, COLON, DIAGNOSTIC      HX APPENDECTOMY      HX CHOLECYSTECTOMY      HX HYSTERECTOMY      HX KNEE ARTHROSCOPY      HX ORTHOPAEDIC      left knee    HX TONSILLECTOMY      HX TUBAL LIGATION      WI UNLISTED PROCEDURE BREAST      reduction mammoplasty       Family History   Problem Relation Age of Onset    OSTEOARTHRITIS Mother     Heart Disease Mother     Diabetes Father     Cancer Father         MULTIPLE MYELOMA    OSTEOARTHRITIS Brother     Diabetes Brother     OSTEOARTHRITIS Brother     Cancer Brother         m\MULTIPLE MYELPMA    Gall Bladder Disease Brother     Heart Disease Brother     Diabetes Brother     Headache Brother     Heart Disease Brother     Hypertension Brother     Stroke Brother        Social History     Tobacco Use    Smoking status: Never    Smokeless tobacco: Never   Substance Use Topics    Alcohol use: Never       ROS   ROS    Objective     Vitals:    01/24/23 1107 01/24/23 1115   BP: (!) 145/88 133/78   Pulse: 86    Resp: 14    Temp: 98.5 °F (36.9 °C)    TempSrc: Temporal    SpO2: 97%    Weight: 178 lb (80.7 kg)    Height: 4' 11\" (1.499 m)    PainSc:   3    PainLoc: Hand        Physical Exam    LABS   Data Review:   Lab Results   Component Value Date/Time    WBC 8.7 07/22/2022 09:15 AM    HGB 13.4 07/22/2022 09:15 AM    HCT 40.3 07/22/2022 09:15 AM    PLATELET 075 92/99/2377 09:15 AM    MCV 95.3 07/22/2022 09:15 AM       Lab Results   Component Value Date/Time    Sodium 140 07/22/2022 09:15 AM    Potassium 3.9 07/22/2022 09:15 AM    Chloride 105 07/22/2022 09:15 AM    CO2 28 07/22/2022 09:15 AM    Anion gap 7 07/22/2022 09:15 AM    Glucose 99 12/28/2022 09:59 AM    BUN 12 07/22/2022 09:15 AM    Creatinine 0.73 07/22/2022 09:15 AM    BUN/Creatinine ratio 16 07/22/2022 09:15 AM    GFR est AA >60 07/22/2022 09:15 AM    GFR est non-AA >60 07/22/2022 09:15 AM    Calcium 9.4 07/22/2022 09:15 AM       Lab Results   Component Value Date/Time    Cholesterol, total 263 (H) 12/28/2022 09:59 AM    Cholesterol, total 261 (H) 12/28/2022 09:59 AM    HDL Cholesterol 76 (H) 12/28/2022 09:59 AM    LDL, calculated 156.2 (H) 12/28/2022 09:59 AM    VLDL, calculated 30.8 12/28/2022 09:59 AM    Triglyceride 154 (H) 12/28/2022 09:59 AM    Triglyceride 154 (H) 12/28/2022 09:59 AM    CHOL/HDL Ratio 3.5 12/28/2022 09:59 AM       Lab Results   Component Value Date/Time    Hemoglobin A1c 5.9 (H) 12/28/2022 09:59 AM       Assessment/Plan:   There are no diagnoses linked to this encounter. Health Maintenance Due   Topic Date Due    Shingles Vaccine (1 of 2) Never done    DTaP/Tdap/Td series (1 - Tdap) 04/22/2011    Colorectal Cancer Screening Combo  07/27/2022         Lab review: {lab reviewed:[id number]}    I have discussed the diagnosis with the patient and the intended plan as seen in the above orders. The patient has received an after-visit summary and questions were answered concerning future plans. I have discussed medication side effects and warnings with the patient as well. I have reviewed the plan of care with the patient, accepted their input and they are in agreement with the treatment goals. All questions were answered. The patient understands the plan of care.  Handouts provided today with above information. ***Pt instructed if symptoms worsen to call the office or report to the ED for continued care. ***Greater than 50% of the visit time was spent in counseling and/or coordination of care. ***The patient was counseled on the dangers of tobacco use, and was {MU SMOKING CESSATION COUNSELIN::\"advised to quit\"}. Reviewed strategies to maximize success, including {techniques:}. 3-10 minutes were spent on smoking/tobacco cessation    Voice recognition was used to generate this report, which may have resulted in some phonetic based errors in grammar and contents. Even though attempts were made to correct all the mistakes, some may have been missed, and remained in the body of the document.           Wes Hammond MD office or report to the ED for continued care. Greater than 50% of the visit time was spent in counseling and/or coordination of care. Voice recognition was used to generate this report, which may have resulted in some phonetic based errors in grammar and contents. Even though attempts were made to correct all the mistakes, some may have been missed, and remained in the body of the document.           Jairon Echevarria MD

## 2023-02-23 DIAGNOSIS — K75.81 NASH (NONALCOHOLIC STEATOHEPATITIS): Primary | ICD-10-CM

## 2023-02-23 DIAGNOSIS — E78.5 HYPERLIPIDEMIA LDL GOAL <130: ICD-10-CM

## 2023-05-16 RX ORDER — EZETIMIBE 10 MG/1
TABLET ORAL
Qty: 30 TABLET | Refills: 5 | Status: SHIPPED | OUTPATIENT
Start: 2023-05-16

## 2023-07-24 RX ORDER — LEVOTHYROXINE SODIUM 0.12 MG/1
TABLET ORAL
Qty: 90 TABLET | Refills: 1 | Status: SHIPPED | OUTPATIENT
Start: 2023-07-24

## 2023-07-25 ENCOUNTER — HOSPITAL ENCOUNTER (OUTPATIENT)
Facility: HOSPITAL | Age: 73
Setting detail: SPECIMEN
Discharge: HOME | End: 2023-07-28
Payer: MEDICARE

## 2023-07-25 DIAGNOSIS — E78.5 HYPERLIPIDEMIA LDL GOAL <130: ICD-10-CM

## 2023-07-25 DIAGNOSIS — K75.81 NASH (NONALCOHOLIC STEATOHEPATITIS): ICD-10-CM

## 2023-07-25 LAB
ALBUMIN SERPL-MCNC: 3.9 G/DL (ref 3.4–5)
ALBUMIN/GLOB SERPL: 1.3 (ref 0.8–1.7)
ALP SERPL-CCNC: 72 U/L (ref 45–117)
ALT SERPL-CCNC: 22 U/L (ref 13–56)
ANION GAP SERPL CALC-SCNC: 7 MMOL/L (ref 3–18)
AST SERPL-CCNC: 21 U/L (ref 10–38)
BILIRUB SERPL-MCNC: 0.4 MG/DL (ref 0.2–1)
BUN SERPL-MCNC: 11 MG/DL (ref 7–18)
BUN/CREAT SERPL: 17 (ref 12–20)
CALCIUM SERPL-MCNC: 9.4 MG/DL (ref 8.5–10.1)
CHLORIDE SERPL-SCNC: 105 MMOL/L (ref 100–111)
CHOLEST SERPL-MCNC: 242 MG/DL
CO2 SERPL-SCNC: 26 MMOL/L (ref 21–32)
CREAT SERPL-MCNC: 0.66 MG/DL (ref 0.6–1.3)
GLOBULIN SER CALC-MCNC: 3.1 G/DL (ref 2–4)
GLUCOSE SERPL-MCNC: 105 MG/DL (ref 74–99)
HDLC SERPL-MCNC: 82 MG/DL (ref 40–60)
HDLC SERPL: 3 (ref 0–5)
LDLC SERPL CALC-MCNC: 134 MG/DL (ref 0–100)
LIPID PANEL: ABNORMAL
POTASSIUM SERPL-SCNC: 4.2 MMOL/L (ref 3.5–5.5)
PROT SERPL-MCNC: 7 G/DL (ref 6.4–8.2)
SODIUM SERPL-SCNC: 138 MMOL/L (ref 136–145)
TRIGL SERPL-MCNC: 130 MG/DL
VLDLC SERPL CALC-MCNC: 26 MG/DL

## 2023-07-25 PROCEDURE — 36415 COLL VENOUS BLD VENIPUNCTURE: CPT

## 2023-07-25 PROCEDURE — 80061 LIPID PANEL: CPT

## 2023-07-25 PROCEDURE — 80053 COMPREHEN METABOLIC PANEL: CPT

## 2023-07-31 SDOH — ECONOMIC STABILITY: INCOME INSECURITY: HOW HARD IS IT FOR YOU TO PAY FOR THE VERY BASICS LIKE FOOD, HOUSING, MEDICAL CARE, AND HEATING?: NOT VERY HARD

## 2023-07-31 SDOH — ECONOMIC STABILITY: TRANSPORTATION INSECURITY
IN THE PAST 12 MONTHS, HAS LACK OF TRANSPORTATION KEPT YOU FROM MEETINGS, WORK, OR FROM GETTING THINGS NEEDED FOR DAILY LIVING?: NO

## 2023-07-31 SDOH — ECONOMIC STABILITY: HOUSING INSECURITY
IN THE LAST 12 MONTHS, WAS THERE A TIME WHEN YOU DID NOT HAVE A STEADY PLACE TO SLEEP OR SLEPT IN A SHELTER (INCLUDING NOW)?: NO

## 2023-07-31 SDOH — ECONOMIC STABILITY: FOOD INSECURITY: WITHIN THE PAST 12 MONTHS, THE FOOD YOU BOUGHT JUST DIDN'T LAST AND YOU DIDN'T HAVE MONEY TO GET MORE.: NEVER TRUE

## 2023-07-31 SDOH — ECONOMIC STABILITY: FOOD INSECURITY: WITHIN THE PAST 12 MONTHS, YOU WORRIED THAT YOUR FOOD WOULD RUN OUT BEFORE YOU GOT MONEY TO BUY MORE.: NEVER TRUE

## 2023-08-01 ENCOUNTER — OFFICE VISIT (OUTPATIENT)
Age: 73
End: 2023-08-01
Payer: MEDICARE

## 2023-08-01 ENCOUNTER — HOSPITAL ENCOUNTER (OUTPATIENT)
Facility: HOSPITAL | Age: 73
Discharge: HOME | End: 2023-08-04
Payer: MEDICARE

## 2023-08-01 VITALS
SYSTOLIC BLOOD PRESSURE: 139 MMHG | TEMPERATURE: 97 F | WEIGHT: 170.6 LBS | RESPIRATION RATE: 16 BRPM | OXYGEN SATURATION: 95 % | DIASTOLIC BLOOD PRESSURE: 90 MMHG | BODY MASS INDEX: 34.39 KG/M2 | HEIGHT: 59 IN | HEART RATE: 80 BPM

## 2023-08-01 DIAGNOSIS — M54.30 SCIATICA, UNSPECIFIED LATERALITY: Primary | ICD-10-CM

## 2023-08-01 DIAGNOSIS — Z00.00 MEDICARE ANNUAL WELLNESS VISIT, SUBSEQUENT: ICD-10-CM

## 2023-08-01 DIAGNOSIS — Z12.31 ENCOUNTER FOR SCREENING MAMMOGRAM FOR BREAST CANCER: ICD-10-CM

## 2023-08-01 DIAGNOSIS — R73.9 HYPERGLYCEMIA, UNSPECIFIED: ICD-10-CM

## 2023-08-01 DIAGNOSIS — E03.8 OTHER SPECIFIED HYPOTHYROIDISM: ICD-10-CM

## 2023-08-01 DIAGNOSIS — Z71.89 ADVANCE CARE PLANNING: ICD-10-CM

## 2023-08-01 DIAGNOSIS — E78.5 HYPERLIPIDEMIA, UNSPECIFIED HYPERLIPIDEMIA TYPE: ICD-10-CM

## 2023-08-01 LAB
EST. AVERAGE GLUCOSE BLD GHB EST-MCNC: 114 MG/DL
HBA1C MFR BLD: 5.6 % (ref 4.2–5.6)
T4 FREE SERPL-MCNC: 1.5 NG/DL (ref 0.7–1.5)
TSH SERPL DL<=0.05 MIU/L-ACNC: 0.48 UIU/ML (ref 0.36–3.74)

## 2023-08-01 PROCEDURE — 1123F ACP DISCUSS/DSCN MKR DOCD: CPT | Performed by: INTERNAL MEDICINE

## 2023-08-01 PROCEDURE — 84443 ASSAY THYROID STIM HORMONE: CPT

## 2023-08-01 PROCEDURE — G8427 DOCREV CUR MEDS BY ELIG CLIN: HCPCS | Performed by: INTERNAL MEDICINE

## 2023-08-01 PROCEDURE — 3017F COLORECTAL CA SCREEN DOC REV: CPT | Performed by: INTERNAL MEDICINE

## 2023-08-01 PROCEDURE — 83036 HEMOGLOBIN GLYCOSYLATED A1C: CPT

## 2023-08-01 PROCEDURE — 1090F PRES/ABSN URINE INCON ASSESS: CPT | Performed by: INTERNAL MEDICINE

## 2023-08-01 PROCEDURE — 99214 OFFICE O/P EST MOD 30 MIN: CPT | Performed by: INTERNAL MEDICINE

## 2023-08-01 PROCEDURE — 84439 ASSAY OF FREE THYROXINE: CPT

## 2023-08-01 PROCEDURE — G8417 CALC BMI ABV UP PARAM F/U: HCPCS | Performed by: INTERNAL MEDICINE

## 2023-08-01 PROCEDURE — G8399 PT W/DXA RESULTS DOCUMENT: HCPCS | Performed by: INTERNAL MEDICINE

## 2023-08-01 PROCEDURE — G0439 PPPS, SUBSEQ VISIT: HCPCS | Performed by: INTERNAL MEDICINE

## 2023-08-01 PROCEDURE — 36415 COLL VENOUS BLD VENIPUNCTURE: CPT

## 2023-08-01 PROCEDURE — 4004F PT TOBACCO SCREEN RCVD TLK: CPT | Performed by: INTERNAL MEDICINE

## 2023-08-01 PROCEDURE — 99211 OFF/OP EST MAY X REQ PHY/QHP: CPT | Performed by: INTERNAL MEDICINE

## 2023-08-01 RX ORDER — TIZANIDINE 2 MG/1
2 TABLET ORAL EVERY 8 HOURS PRN
Qty: 30 TABLET | Refills: 5 | Status: SHIPPED | OUTPATIENT
Start: 2023-08-01

## 2023-08-01 RX ORDER — EZETIMIBE 10 MG/1
10 TABLET ORAL DAILY
Qty: 90 TABLET | Refills: 3 | Status: SHIPPED | OUTPATIENT
Start: 2023-08-01

## 2023-08-01 RX ORDER — METHYLPREDNISOLONE 4 MG/1
TABLET ORAL
Qty: 1 KIT | Refills: 0 | Status: SHIPPED | OUTPATIENT
Start: 2023-08-01 | End: 2023-08-07

## 2023-08-01 ASSESSMENT — PATIENT HEALTH QUESTIONNAIRE - PHQ9
2. FEELING DOWN, DEPRESSED OR HOPELESS: 0
SUM OF ALL RESPONSES TO PHQ9 QUESTIONS 1 & 2: 0
SUM OF ALL RESPONSES TO PHQ QUESTIONS 1-9: 0
1. LITTLE INTEREST OR PLEASURE IN DOING THINGS: 0
SUM OF ALL RESPONSES TO PHQ QUESTIONS 1-9: 0

## 2023-08-01 NOTE — PROGRESS NOTES
Ralphne Juliette presents today for   Chief Complaint   Patient presents with    6 Month Follow-Up                 1. \"Have you been to the ER, urgent care clinic since your last visit? Hospitalized since your last visit? \" no    2. \"Have you seen or consulted any other health care providers outside of the [address] [place] [address] since your last visit? \" no     3. For patients aged 43-73: Has the patient had a colonoscopy / FIT/ Cologuard? Yes - Care Gap present. Rooming MA/LPN to request most recent results      If the patient is female:    4. For patients aged 43-66: Has the patient had a mammogram within the past 2 years? Yes - no Care Gap present      5. For patients aged 21-65: Has the patient had a pap smear?  NA - based on age or sex

## 2023-08-01 NOTE — PROGRESS NOTES
INTERNISTS OF [facility]:  8/7/2023, MRN: [id number]      García Cerda is a 68 y.o. female and presents to clinic for 6 Month Follow-Up      Subjective: The pt is a 69 yo female with h/o hypothyroidism, NAFLD, B12 deficiency, GERD, prediabetes, left rotator cuff tendinopathy, diverticulitis per EHR, s/p abx from left knee infection after a knee replacement surgery, OA, pseudogout, asthma, and allergic rhinitis. 1. Obesity: BMI is 34. She is doing intermittent fasting. Her weight is down to 170lbs! 2. Lower Back Pain: +H/o sciatica. S/p an injection earlier this yr. She had improvement in her pain after the shot. She has pain along her lower back that radiates down her legs. She moved some furniture 3 wks ago. Sx are improving. 3.  HLD and Prediabetes/Hyperglycemia: Her July labs show that her total cholesterol was 242, down from 263 in December. Her HDL is 82. It was 68 previously. Her LDL is down to 134 from 156. Her triglycerides are 130. LFTs are unremarkable. Her creatinine is normal.  Her electrolytes unremarkable. Her A1c is 5.9 per her December labs. She is overdue for an A1c to be checked. She is on Zetia 10 mg daily. She has a history of statin intolerance. 4.  Hypothyroidism: She is overdue to have TFTs checked. She is taking Synthroid 125 mcg daily.         Patient Active Problem List    Diagnosis Date Noted    Osteopenia 08/31/2021    History of non anemic vitamin B12 deficiency 08/21/2020    Menopausal symptoms 08/21/2020    KAUFFMAN (nonalcoholic steatohepatitis) 08/21/2020    Lumbar facet arthropathy 08/21/2020    Vitamin B12 deficiency 08/14/2017    Hypothyroidism due to Hashimoto's thyroiditis 02/14/2017    Gastroesophageal reflux disease without esophagitis 02/14/2017    History of diverticulitis 10/13/2016    Hyperlipidemia LDL goal <130 05/11/2016    Right knee pain     Arthritis of left knee 04/29/2015    Venous insufficiency 09/09/2013    Asthma     Allergic rhinitis

## 2023-08-07 ASSESSMENT — ENCOUNTER SYMPTOMS
SHORTNESS OF BREATH: 0
EYE PAIN: 0
ABDOMINAL PAIN: 0
BACK PAIN: 1
COUGH: 0
ANAL BLEEDING: 0
BLOOD IN STOOL: 0
SORE THROAT: 0

## 2023-08-08 NOTE — ACP (ADVANCE CARE PLANNING)
Advance Care Planning     General Advance Care Planning (ACP) Conversation    Date of Conversation: 8/1/23    Conducted with: Patient with Decision Making Capacity    Healthcare Decision Maker: Today we documented Decision Maker(s) consistent with ACP documents on file. Content/Action Overview: Has ACP document(s) on file - reflects the patient's care preferences    She has no changes to make to her previously advance directive.     Length of Voluntary ACP Conversation in minutes:  <16 minutes (Non-Billable)    Ronald He MD

## 2023-08-11 ENCOUNTER — TELEPHONE (OUTPATIENT)
Age: 73
End: 2023-08-11

## 2023-08-11 NOTE — TELEPHONE ENCOUNTER
Patient called and states that Dr Faiza De Paz prescribed her methylPREDNISolone (MEDROL DOSEPACK) 4 MG tablet for back pain. She wanted to let Dr Faiza De Paz know that she finished the prescription and her back pain is about 85% better.

## 2023-09-08 ENCOUNTER — TELEPHONE (OUTPATIENT)
Age: 73
End: 2023-09-08

## 2023-09-08 NOTE — TELEPHONE ENCOUNTER
Spoke with pt, informed pt of the following:  Please let her know that her A1c and TFTs are normal.  She should continue taking her medications as prescribed. Pt expressed understanding and had no further concerns.

## 2023-09-14 ENCOUNTER — TELEPHONE (OUTPATIENT)
Age: 73
End: 2023-09-14

## 2023-09-14 DIAGNOSIS — R92.8 ABNORMALITY OF RIGHT BREAST ON SCREENING MAMMOGRAM: Primary | ICD-10-CM

## 2023-09-14 NOTE — TELEPHONE ENCOUNTER
----- Message from Junie Gillespie sent at 9/13/2023  4:26 PM EDT -----  Regarding: Add'l mammogram views  Contact: [phone]  Rec'ed call about my mammogram.. Was told needed add'l views and possible u/s  I need for you to submit order so I can get this scheduled asap  Thank you

## 2023-12-13 RX ORDER — ESTRADIOL 1 MG/1
1 TABLET ORAL DAILY
Qty: 90 TABLET | Refills: 1 | Status: SHIPPED | OUTPATIENT
Start: 2023-12-13

## 2024-01-05 RX ORDER — LEVOTHYROXINE SODIUM 0.12 MG/1
TABLET ORAL
Qty: 90 TABLET | Refills: 1 | Status: SHIPPED | OUTPATIENT
Start: 2024-01-05

## 2024-01-12 ENCOUNTER — HOSPITAL ENCOUNTER (OUTPATIENT)
Facility: HOSPITAL | Age: 74
End: 2024-01-12
Payer: MEDICARE

## 2024-01-12 LAB
CHOLEST SERPL-MCNC: 237 MG/DL
EST. AVERAGE GLUCOSE BLD GHB EST-MCNC: 117 MG/DL
HBA1C MFR BLD: 5.7 % (ref 4.2–5.6)
HDLC SERPL-MCNC: 85 MG/DL (ref 40–60)
HDLC SERPL: 2.8 (ref 0–5)
LDLC SERPL CALC-MCNC: 131.6 MG/DL (ref 0–100)
LIPID PANEL: ABNORMAL
TRIGL SERPL-MCNC: 102 MG/DL
VLDLC SERPL CALC-MCNC: 20.4 MG/DL

## 2024-01-12 PROCEDURE — 83036 HEMOGLOBIN GLYCOSYLATED A1C: CPT

## 2024-01-12 PROCEDURE — 36415 COLL VENOUS BLD VENIPUNCTURE: CPT

## 2024-01-12 PROCEDURE — 80061 LIPID PANEL: CPT

## 2024-02-01 ENCOUNTER — OFFICE VISIT (OUTPATIENT)
Age: 74
End: 2024-02-01
Payer: MEDICARE

## 2024-02-01 VITALS
SYSTOLIC BLOOD PRESSURE: 145 MMHG | BODY MASS INDEX: 33.47 KG/M2 | TEMPERATURE: 98.1 F | DIASTOLIC BLOOD PRESSURE: 86 MMHG | HEART RATE: 80 BPM | HEIGHT: 59 IN | OXYGEN SATURATION: 98 % | WEIGHT: 166 LBS

## 2024-02-01 DIAGNOSIS — M54.30 SCIATICA, UNSPECIFIED LATERALITY: ICD-10-CM

## 2024-02-01 DIAGNOSIS — E78.5 HYPERLIPIDEMIA, UNSPECIFIED HYPERLIPIDEMIA TYPE: ICD-10-CM

## 2024-02-01 DIAGNOSIS — E03.8 HYPOTHYROIDISM DUE TO HASHIMOTO'S THYROIDITIS: ICD-10-CM

## 2024-02-01 DIAGNOSIS — R73.9 HYPERGLYCEMIA, UNSPECIFIED: Primary | ICD-10-CM

## 2024-02-01 DIAGNOSIS — E06.3 HYPOTHYROIDISM DUE TO HASHIMOTO'S THYROIDITIS: ICD-10-CM

## 2024-02-01 PROCEDURE — G8417 CALC BMI ABV UP PARAM F/U: HCPCS | Performed by: INTERNAL MEDICINE

## 2024-02-01 PROCEDURE — 1123F ACP DISCUSS/DSCN MKR DOCD: CPT | Performed by: INTERNAL MEDICINE

## 2024-02-01 PROCEDURE — 3017F COLORECTAL CA SCREEN DOC REV: CPT | Performed by: INTERNAL MEDICINE

## 2024-02-01 PROCEDURE — G8399 PT W/DXA RESULTS DOCUMENT: HCPCS | Performed by: INTERNAL MEDICINE

## 2024-02-01 PROCEDURE — 4004F PT TOBACCO SCREEN RCVD TLK: CPT | Performed by: INTERNAL MEDICINE

## 2024-02-01 PROCEDURE — 99214 OFFICE O/P EST MOD 30 MIN: CPT | Performed by: INTERNAL MEDICINE

## 2024-02-01 PROCEDURE — G8484 FLU IMMUNIZE NO ADMIN: HCPCS | Performed by: INTERNAL MEDICINE

## 2024-02-01 PROCEDURE — G8427 DOCREV CUR MEDS BY ELIG CLIN: HCPCS | Performed by: INTERNAL MEDICINE

## 2024-02-01 PROCEDURE — 1090F PRES/ABSN URINE INCON ASSESS: CPT | Performed by: INTERNAL MEDICINE

## 2024-02-01 ASSESSMENT — PATIENT HEALTH QUESTIONNAIRE - PHQ9
2. FEELING DOWN, DEPRESSED OR HOPELESS: 0
1. LITTLE INTEREST OR PLEASURE IN DOING THINGS: 0
SUM OF ALL RESPONSES TO PHQ QUESTIONS 1-9: 0
SUM OF ALL RESPONSES TO PHQ QUESTIONS 1-9: 0
SUM OF ALL RESPONSES TO PHQ9 QUESTIONS 1 & 2: 0
SUM OF ALL RESPONSES TO PHQ QUESTIONS 1-9: 0
SUM OF ALL RESPONSES TO PHQ QUESTIONS 1-9: 0

## 2024-02-01 ASSESSMENT — ENCOUNTER SYMPTOMS
BLOOD IN STOOL: 0
EYE PAIN: 0
COUGH: 0
ABDOMINAL PAIN: 0
SHORTNESS OF BREATH: 0
ANAL BLEEDING: 0
SORE THROAT: 0

## 2024-02-01 NOTE — PROGRESS NOTES
Suyapa F Gonsales presents today for   Chief Complaint   Patient presents with    Follow-up                 1. \"Have you been to the ER, urgent care clinic since your last visit?  Hospitalized since your last visit?\" no    2. \"Have you seen or consulted any other health care providers outside of the [facility] since your last visit?\" no     3. For patients aged 45-75: Has the patient had a colonoscopy / FIT/ Cologuard? Yes - no Care Gap present      If the patient is female:    4. For patients aged 40-74: Has the patient had a mammogram within the past 2 years? Yes - no Care Gap present      5. For patients aged 21-65: Has the patient had a pap smear? No

## 2024-02-01 NOTE — PROGRESS NOTES
INTERNISTS OF [facility]:  2/1/2024, MRN: [id number]      Suyapa Gonsales is a 73 y.o. female and presents to clinic for Follow-up      Subjective:   The pt is a 74 yo female with h/o hypothyroidism, NAFLD, B12 deficiency, GERD, prediabetes, left rotator cuff tendinopathy, diverticulitis per EHR, s/p abx from left knee infection after a knee replacement surgery, OA, pseudogout, asthma, and allergic rhinitis.     1.  Hypothyroidism: Treated with 125 mcg of Synthroid. Her last TFTs were from last yr and WNL.    2.  Prediabetes/Hyperglycemia: BMI today is 33.  It was 34 at her last visit.  Weight is down to 166 pounds.  January labs show that her A1c is 5.7. She is walking daily.     3.  Sciatica: Flaring up at the time of her last visit.  It was complicated by muscle strain.  At that time, a Medrol Dosepak was ordered in addition to tizanidine 2 mg 3 times daily as needed for muscle strain.  Today she reports: \"It still bothers me. I'm almost at the point where I'm going to get another injection.\" She responded well in the past to an injection.Her pain is radiating down her RLE. +H/o lumbar spinal stenosis per pt hx. Pain is a burning sensation.     4.  Hyperlipidemia: Her LDL was not at goal at the time of her last visit.  She was to continue taking Zetia 10 mg daily given her history of statin intolerance.January labs show that her total cholesterol was 237.  HDL was 85.  Her LDL is 131.Her cholesterol is about the same as it was last summer.        Patient Active Problem List    Diagnosis Date Noted    Osteopenia 08/31/2021    History of non anemic vitamin B12 deficiency 08/21/2020    Menopausal symptoms 08/21/2020    KAUFFMAN (nonalcoholic steatohepatitis) 08/21/2020    Lumbar facet arthropathy 08/21/2020    Vitamin B12 deficiency 08/14/2017    Hypothyroidism due to Hashimoto's thyroiditis 02/14/2017    Gastroesophageal reflux disease without esophagitis 02/14/2017    History of diverticulitis 10/13/2016

## 2024-04-24 NOTE — PROGRESS NOTES
Care Transition Team Assessment    Information Source  Orientation Level: Oriented X4  Information Given By: Patient  Informant's Name: South  Who is responsible for making decisions for patient? : Patient    Readmission Evaluation  Is this a readmission?: No    Elopement Risk  Legal Hold: No  Ambulatory or Self Mobile in Wheelchair: Yes  Disoriented: No  Psychiatric Symptoms: None  History of Wandering: No  Elopement this Admit: No  Vocalizing Wanting to Leave: No  Displays Behaviors, Body Language Wanting to Leave: No-Not at Risk for Elopement  Elopement Risk: Not at Risk for Elopement    Interdisciplinary Discharge Planning  Lives with - Patient's Self Care Capacity: Significant Other, Child Less than 18 Years of Age  Patient or legal guardian wants to designate a caregiver: No  Support Systems: Family Member(s)  Housing / Facility: [address] [facility]  Durable Medical Equipment: Not Applicable  PCP: Narciso Daly with [facility]              Finances  Financial Barriers to Discharge: No  Prescription Coverage: Yes    Vision / Hearing Impairment  Vision Impairment : No  Hearing Impairment : No              Domestic Abuse  Have you ever been the victim of abuse or violence?: No  Physical Abuse or Sexual Abuse: No  Verbal Abuse or Emotional Abuse: No  Possible Abuse/Neglect Reported to:: Not Applicable    Psychological Assessment  History of Substance Abuse: None  History of Psychiatric Problems: No    Discharge Risks or Barriers  Discharge risks or barriers?: No    Anticipated Discharge Information  Discharge Disposition: Discharged to home/self care (01)  Discharge Address: See Face Sheet  Discharge Contact Phone Number: See Face Sheet         Patient contacted, patient identified with two identifiers (Name & ). Patient aware of results per DR. Vo and verbalizes understanding. Patient scheduled for a lab visit.

## 2024-07-19 RX ORDER — LEVOTHYROXINE SODIUM 0.12 MG/1
125 TABLET ORAL
Qty: 90 TABLET | Refills: 1 | Status: CANCELLED | OUTPATIENT
Start: 2024-07-19

## 2024-07-19 RX ORDER — LEVOTHYROXINE SODIUM 0.12 MG/1
TABLET ORAL
Qty: 90 TABLET | Refills: 1 | Status: SHIPPED | OUTPATIENT
Start: 2024-07-19

## 2024-07-25 ENCOUNTER — HOSPITAL ENCOUNTER (OUTPATIENT)
Facility: HOSPITAL | Age: 74
Setting detail: SPECIMEN
Discharge: HOME | End: 2024-07-25
Payer: MEDICARE

## 2024-07-25 DIAGNOSIS — E06.3 HYPOTHYROIDISM DUE TO HASHIMOTO'S THYROIDITIS: ICD-10-CM

## 2024-07-25 DIAGNOSIS — E03.8 HYPOTHYROIDISM DUE TO HASHIMOTO'S THYROIDITIS: ICD-10-CM

## 2024-07-25 DIAGNOSIS — E78.5 HYPERLIPIDEMIA, UNSPECIFIED HYPERLIPIDEMIA TYPE: ICD-10-CM

## 2024-07-25 DIAGNOSIS — R73.9 HYPERGLYCEMIA, UNSPECIFIED: ICD-10-CM

## 2024-07-25 LAB
ALBUMIN SERPL-MCNC: 3.9 G/DL (ref 3.4–5)
ALBUMIN/GLOB SERPL: 1.3 (ref 0.8–1.7)
ALP SERPL-CCNC: 67 U/L (ref 45–117)
ALT SERPL-CCNC: 21 U/L (ref 13–56)
ANION GAP SERPL CALC-SCNC: 4 MMOL/L (ref 3–18)
AST SERPL-CCNC: 19 U/L (ref 10–38)
BASOPHILS # BLD: 0.1 K/UL (ref 0–0.1)
BASOPHILS NFR BLD: 1 % (ref 0–2)
BILIRUB SERPL-MCNC: 0.6 MG/DL (ref 0.2–1)
BUN SERPL-MCNC: 15 MG/DL (ref 7–18)
BUN/CREAT SERPL: 20 (ref 12–20)
CALCIUM SERPL-MCNC: 9.7 MG/DL (ref 8.5–10.1)
CHLORIDE SERPL-SCNC: 107 MMOL/L (ref 100–111)
CO2 SERPL-SCNC: 28 MMOL/L (ref 21–32)
CREAT SERPL-MCNC: 0.74 MG/DL (ref 0.6–1.3)
DIFFERENTIAL METHOD BLD: NORMAL
EOSINOPHIL # BLD: 0.2 K/UL (ref 0–0.4)
EOSINOPHIL NFR BLD: 2 % (ref 0–5)
ERYTHROCYTE [DISTWIDTH] IN BLOOD BY AUTOMATED COUNT: 13 % (ref 11.6–14.5)
EST. AVERAGE GLUCOSE BLD GHB EST-MCNC: 114 MG/DL
GLOBULIN SER CALC-MCNC: 3 G/DL (ref 2–4)
GLUCOSE SERPL-MCNC: 102 MG/DL (ref 74–99)
HBA1C MFR BLD: 5.6 % (ref 4.2–5.6)
HCT VFR BLD AUTO: 40.3 % (ref 35–45)
HGB BLD-MCNC: 13.3 G/DL (ref 12–16)
IMM GRANULOCYTES # BLD AUTO: 0 K/UL (ref 0–0.04)
IMM GRANULOCYTES NFR BLD AUTO: 0 % (ref 0–0.5)
LYMPHOCYTES # BLD: 2.3 K/UL (ref 0.9–3.6)
LYMPHOCYTES NFR BLD: 29 % (ref 21–52)
MCH RBC QN AUTO: 31.3 PG (ref 24–34)
MCHC RBC AUTO-ENTMCNC: 33 G/DL (ref 31–37)
MCV RBC AUTO: 94.8 FL (ref 78–100)
MONOCYTES # BLD: 0.7 K/UL (ref 0.05–1.2)
MONOCYTES NFR BLD: 9 % (ref 3–10)
NEUTS SEG # BLD: 4.7 K/UL (ref 1.8–8)
NEUTS SEG NFR BLD: 59 % (ref 40–73)
NRBC # BLD: 0 K/UL (ref 0–0.01)
NRBC BLD-RTO: 0 PER 100 WBC
PLATELET # BLD AUTO: 323 K/UL (ref 135–420)
PMV BLD AUTO: 11.1 FL (ref 9.2–11.8)
POTASSIUM SERPL-SCNC: 4.7 MMOL/L (ref 3.5–5.5)
PROT SERPL-MCNC: 6.9 G/DL (ref 6.4–8.2)
RBC # BLD AUTO: 4.25 M/UL (ref 4.2–5.3)
SODIUM SERPL-SCNC: 139 MMOL/L (ref 136–145)
T4 FREE SERPL-MCNC: 1.4 NG/DL (ref 0.7–1.5)
TSH SERPL DL<=0.05 MIU/L-ACNC: 0.61 UIU/ML (ref 0.36–3.74)
WBC # BLD AUTO: 8 K/UL (ref 4.6–13.2)

## 2024-07-25 PROCEDURE — 84443 ASSAY THYROID STIM HORMONE: CPT

## 2024-07-25 PROCEDURE — 83036 HEMOGLOBIN GLYCOSYLATED A1C: CPT

## 2024-07-25 PROCEDURE — 84439 ASSAY OF FREE THYROXINE: CPT

## 2024-07-25 PROCEDURE — 80053 COMPREHEN METABOLIC PANEL: CPT

## 2024-07-25 PROCEDURE — 85025 COMPLETE CBC W/AUTO DIFF WBC: CPT

## 2024-07-25 PROCEDURE — 36415 COLL VENOUS BLD VENIPUNCTURE: CPT

## 2024-08-01 ENCOUNTER — OFFICE VISIT (OUTPATIENT)
Facility: CLINIC | Age: 74
End: 2024-08-01

## 2024-08-01 VITALS
SYSTOLIC BLOOD PRESSURE: 134 MMHG | HEIGHT: 59 IN | BODY MASS INDEX: 33.63 KG/M2 | DIASTOLIC BLOOD PRESSURE: 81 MMHG | HEART RATE: 77 BPM | RESPIRATION RATE: 17 BRPM | OXYGEN SATURATION: 90 % | TEMPERATURE: 97.2 F | WEIGHT: 166.8 LBS

## 2024-08-01 DIAGNOSIS — R73.9 HYPERGLYCEMIA, UNSPECIFIED: ICD-10-CM

## 2024-08-01 DIAGNOSIS — E03.8 HYPOTHYROIDISM DUE TO HASHIMOTO'S THYROIDITIS: ICD-10-CM

## 2024-08-01 DIAGNOSIS — M54.30 SCIATICA, UNSPECIFIED LATERALITY: Primary | ICD-10-CM

## 2024-08-01 DIAGNOSIS — E06.3 HYPOTHYROIDISM DUE TO HASHIMOTO'S THYROIDITIS: ICD-10-CM

## 2024-08-01 DIAGNOSIS — Z12.31 ENCOUNTER FOR SCREENING MAMMOGRAM FOR BREAST CANCER: ICD-10-CM

## 2024-08-01 DIAGNOSIS — E78.5 HYPERLIPIDEMIA, UNSPECIFIED HYPERLIPIDEMIA TYPE: ICD-10-CM

## 2024-08-01 DIAGNOSIS — Z00.00 MEDICARE ANNUAL WELLNESS VISIT, SUBSEQUENT: ICD-10-CM

## 2024-08-01 DIAGNOSIS — Z71.89 ADVANCE CARE PLANNING: ICD-10-CM

## 2024-08-01 RX ORDER — AMOXICILLIN 500 MG/1
1000 CAPSULE ORAL ONCE
COMMUNITY

## 2024-08-01 RX ORDER — LANOLIN ALCOHOL/MO/W.PET/CERES
1000 CREAM (GRAM) TOPICAL EVERY OTHER DAY
COMMUNITY

## 2024-08-01 SDOH — ECONOMIC STABILITY: FOOD INSECURITY: WITHIN THE PAST 12 MONTHS, THE FOOD YOU BOUGHT JUST DIDN'T LAST AND YOU DIDN'T HAVE MONEY TO GET MORE.: NEVER TRUE

## 2024-08-01 SDOH — ECONOMIC STABILITY: FOOD INSECURITY: WITHIN THE PAST 12 MONTHS, YOU WORRIED THAT YOUR FOOD WOULD RUN OUT BEFORE YOU GOT MONEY TO BUY MORE.: NEVER TRUE

## 2024-08-01 SDOH — ECONOMIC STABILITY: INCOME INSECURITY: HOW HARD IS IT FOR YOU TO PAY FOR THE VERY BASICS LIKE FOOD, HOUSING, MEDICAL CARE, AND HEATING?: NOT HARD AT ALL

## 2024-08-01 ASSESSMENT — PATIENT HEALTH QUESTIONNAIRE - PHQ9
SUM OF ALL RESPONSES TO PHQ QUESTIONS 1-9: 0
1. LITTLE INTEREST OR PLEASURE IN DOING THINGS: NOT AT ALL
SUM OF ALL RESPONSES TO PHQ QUESTIONS 1-9: 0
2. FEELING DOWN, DEPRESSED OR HOPELESS: NOT AT ALL
SUM OF ALL RESPONSES TO PHQ9 QUESTIONS 1 & 2: 0
SUM OF ALL RESPONSES TO PHQ QUESTIONS 1-9: 0
SUM OF ALL RESPONSES TO PHQ QUESTIONS 1-9: 0

## 2024-08-01 ASSESSMENT — LIFESTYLE VARIABLES
HOW OFTEN DO YOU HAVE A DRINK CONTAINING ALCOHOL: NEVER
HOW MANY STANDARD DRINKS CONTAINING ALCOHOL DO YOU HAVE ON A TYPICAL DAY: PATIENT DOES NOT DRINK

## 2024-08-01 NOTE — PROGRESS NOTES
[facility] [place][note 2]:  8/1/2024, MRN: [id number]      Suyapa Gonsales is a 74 y.o. female and presents to clinic for Medicare AWV      Subjective:   The pt is a 73 yo female with h/o hypothyroidism, NAFLD, B12 deficiency, GERD, prediabetes, left rotator cuff tendinopathy, diverticulitis per EHR, s/p abx from left knee infection after a knee replacement surgery, OA, pseudogout, lumbar spinal stenosis, asthma, and allergic rhinitis.     1. Sciatica: Worsening. Her last injection was in September but since her February apt with me, her pain has worsened. She is having persistent radiation of pain along her RLE. It is starting to spread across to her left lower back. She walks her dog twice a day and gets on her treadmill daily as a HEP. +Ataxia. She rarely takes zanaflex. A 4 mg dose made her feel uneasy.  She used to take mobic. She declines a neuropathic pain rx.  She has a history of lumbar spinal stenosis.    2. Prediabetes/Hyperglycemia and HLD: She is doing intermittent fasting. +Stress eating. Her brother has MM. \"He's really bad off right now.\" Her other brother has lung cancer and is undergoing treatment. Her brothers are local.  She is on Zetia 10 mg daily.  Her last lipid panel was from earlier this year in January.  BMI 33.     Latest Reference Range & Units 01/12/24 14:06 07/25/24 10:20   Hemoglobin A1C 4.2 - 5.6 % 5.7 (H) 5.6   (H): Data is abnormally high    3. Hypothyroidism: Her recent Tfts are WNL. On synthroid 125mcg daily.  Her CMP her recent lab work is unremarkable.        Patient Active Problem List    Diagnosis Date Noted    Osteopenia 08/31/2021    History of non anemic vitamin B12 deficiency 08/21/2020    Menopausal symptoms 08/21/2020    KAUFFMAN (nonalcoholic steatohepatitis) 08/21/2020    Lumbar facet arthropathy 08/21/2020    Vitamin B12 deficiency 08/14/2017    Hypothyroidism due to Hashimoto's thyroiditis 02/14/2017    Gastroesophageal reflux disease without esophagitis 02/14/2017

## 2024-08-01 NOTE — PROGRESS NOTES
Suyapa Gonsales presents today for   Chief Complaint   Patient presents with    Medicare AWV           \"Have you been to the ER, urgent care clinic since your last visit?  Hospitalized since your last visit?\"    NO    “Have you seen or consulted any other health care providers outside of [facility] since your last visit?”    Yes; 4 months; Dermatology-f/u

## 2024-08-01 NOTE — ACP (ADVANCE CARE PLANNING)
Advance Care Planning     General Advance Care Planning (ACP) Conversation    Date of Conversation: 8/1/24    Conducted with: Patient with Decision Making Capacity    Healthcare Decision Maker:  Today we documented Decision Maker(s) consistent with ACP documents on file.    Content/Action Overview:  Has ACP document(s) on file - reflects the patient's care preferences    We reviewed her pre-existing advance directive and she has no changes to make to this document at this time.    Length of Voluntary ACP Conversation in minutes:  <16 minutes (Non-Billable)    Brenda Washington MD

## 2024-08-01 NOTE — PATIENT INSTRUCTIONS
secondhand smoke too.     Stay at a weight that's healthy for you. Talk to your doctor if you need help losing weight.     Try to get 7 to 9 hours of sleep each night.     Limit alcohol to 2 drinks a day for men and 1 drink a day for women. Too much alcohol can cause health problems.     Manage other health problems such as diabetes, high blood pressure, and high cholesterol. If you think you may have a problem with alcohol or drug use, talk to your doctor.   Medicines    Take your medicines exactly as prescribed. Call your doctor if you think you are having a problem with your medicine.     If your doctor recommends aspirin, take the amount directed each day. Make sure you take aspirin and not another kind of pain reliever, such as acetaminophen (Tylenol).   When should you call for help?   Call 911 if you have symptoms of a heart attack. These may include:    Chest pain or pressure, or a strange feeling in the chest.     Sweating.     Shortness of breath.     Pain, pressure, or a strange feeling in the back, neck, jaw, or upper belly or in one or both shoulders or arms.     Lightheadedness or sudden weakness.     A fast or irregular heartbeat.   After you call 911, the  may tell you to chew 1 adult-strength or 2 to 4 low-dose aspirin. Wait for an ambulance. Do not try to drive yourself.  Watch closely for changes in your health, and be sure to contact your doctor if you have any problems.  Where can you learn more?  Go to https://www.[employer].net/patientEd and enter F075 to learn more about \"A Healthy Heart: Care Instructions.\"  Current as of: June 24, 2023  Content Version: 14.1  © 2006-2024 [employer].   Care instructions adapted under license by [employer]. If you have questions about a medical condition or this instruction, always ask your healthcare professional. [employer] disclaims any warranty or liability for your use of this information.      Personalized

## 2024-08-11 DIAGNOSIS — E78.5 HYPERLIPIDEMIA, UNSPECIFIED HYPERLIPIDEMIA TYPE: ICD-10-CM

## 2024-08-12 RX ORDER — EZETIMIBE 10 MG/1
10 TABLET ORAL DAILY
Qty: 90 TABLET | Refills: 1 | Status: SHIPPED | OUTPATIENT
Start: 2024-08-12

## 2024-08-12 NOTE — TELEPHONE ENCOUNTER
PCP: Brenda Washington MD    LAST REFILL PER CHART:  Medication:ezetimibe (ZETIA) 10 MG tablet   Ordered On:08/01/2023  Instructions:Take 1 tablet by mouth daily   Dispense:90 tablets  Refills:3      Future Appointments   Date Time Provider Department Center   1/27/2025 10:00 AM [facility] LAB VISIT [facility] DEP   2/3/2025 10:40 AM Brenda Washington MD [facility] DEP

## 2024-09-12 RX ORDER — ESTRADIOL 1 MG/1
1 TABLET ORAL DAILY
Qty: 90 TABLET | Refills: 1 | Status: SHIPPED | OUTPATIENT
Start: 2024-09-12

## 2024-10-02 ENCOUNTER — PATIENT MESSAGE (OUTPATIENT)
Facility: CLINIC | Age: 74
End: 2024-10-02

## 2024-10-19 RX ORDER — LEVOTHYROXINE SODIUM 125 UG/1
TABLET ORAL
Qty: 90 TABLET | Refills: 1 | Status: SHIPPED | OUTPATIENT
Start: 2024-10-19

## 2024-10-22 ENCOUNTER — PATIENT MESSAGE (OUTPATIENT)
Facility: CLINIC | Age: 74
End: 2024-10-22

## 2024-11-14 DIAGNOSIS — E78.5 HYPERLIPIDEMIA, UNSPECIFIED HYPERLIPIDEMIA TYPE: ICD-10-CM

## 2024-11-14 RX ORDER — EZETIMIBE 10 MG/1
10 TABLET ORAL DAILY
Qty: 90 TABLET | Refills: 1 | Status: SHIPPED | OUTPATIENT
Start: 2024-11-14

## 2024-12-09 RX ORDER — ESTRADIOL 1 MG/1
1 TABLET ORAL DAILY
Qty: 90 TABLET | Refills: 1 | Status: SHIPPED | OUTPATIENT
Start: 2024-12-09

## 2024-12-09 NOTE — TELEPHONE ENCOUNTER
PCP: Brenda Washington MD    LAST OFFICE VISIT: 08/01/2024    LAST REFILL PER CHART:  Medication:estradiol (ESTRACE) 1 MG tablet   Ordered On:09/12/2024  Instructions:TAKE 1 TABLET BY MOUTH DAILY   Dispense:90 tablets  Refills:1      Future Appointments   Date Time Provider Department Center   1/27/2025 10:00 AM C LAB VISIT [facility] DEP   2/3/2025 10:40 AM Brenda Washington MD [facility]   2/18/2025 10:15 AM [facility] MAMMO 2 [facility]

## 2025-01-17 RX ORDER — LEVOTHYROXINE SODIUM 125 UG/1
125 TABLET ORAL
Qty: 90 TABLET | Refills: 1 | Status: SHIPPED | OUTPATIENT
Start: 2025-01-17

## 2025-01-27 ENCOUNTER — HOSPITAL ENCOUNTER (OUTPATIENT)
Facility: HOSPITAL | Age: 75
Setting detail: SPECIMEN
Discharge: HOME | End: 2025-01-30
Payer: MEDICARE

## 2025-01-27 DIAGNOSIS — E78.5 HYPERLIPIDEMIA, UNSPECIFIED HYPERLIPIDEMIA TYPE: ICD-10-CM

## 2025-01-27 DIAGNOSIS — R73.9 HYPERGLYCEMIA, UNSPECIFIED: ICD-10-CM

## 2025-01-27 LAB
EST. AVERAGE GLUCOSE BLD GHB EST-MCNC: 120 MG/DL
HBA1C MFR BLD: 5.8 % (ref 4.2–5.6)

## 2025-01-27 PROCEDURE — 80061 LIPID PANEL: CPT

## 2025-01-27 PROCEDURE — 83036 HEMOGLOBIN GLYCOSYLATED A1C: CPT

## 2025-01-27 PROCEDURE — 36415 COLL VENOUS BLD VENIPUNCTURE: CPT

## 2025-01-28 LAB
CHOLEST SERPL-MCNC: 234 MG/DL
HDLC SERPL-MCNC: 78 MG/DL (ref 40–60)
HDLC SERPL: 3 (ref 0–5)
LDLC SERPL CALC-MCNC: 122.4 MG/DL (ref 0–100)
LIPID PANEL: ABNORMAL
TRIGL SERPL-MCNC: 168 MG/DL
VLDLC SERPL CALC-MCNC: 33.6 MG/DL

## 2025-02-03 ENCOUNTER — OFFICE VISIT (OUTPATIENT)
Facility: CLINIC | Age: 75
End: 2025-02-03

## 2025-02-03 VITALS
DIASTOLIC BLOOD PRESSURE: 85 MMHG | OXYGEN SATURATION: 93 % | HEART RATE: 80 BPM | BODY MASS INDEX: 33.26 KG/M2 | HEIGHT: 59 IN | RESPIRATION RATE: 18 BRPM | SYSTOLIC BLOOD PRESSURE: 135 MMHG | TEMPERATURE: 98.5 F | WEIGHT: 165 LBS

## 2025-02-03 DIAGNOSIS — R73.9 HYPERGLYCEMIA, UNSPECIFIED: ICD-10-CM

## 2025-02-03 DIAGNOSIS — E06.3 HYPOTHYROIDISM DUE TO HASHIMOTO'S THYROIDITIS: ICD-10-CM

## 2025-02-03 DIAGNOSIS — M54.30 SCIATICA, UNSPECIFIED LATERALITY: ICD-10-CM

## 2025-02-03 DIAGNOSIS — E78.5 HYPERLIPIDEMIA, UNSPECIFIED HYPERLIPIDEMIA TYPE: Primary | ICD-10-CM

## 2025-02-03 RX ORDER — DICYCLOMINE HYDROCHLORIDE 10 MG/1
CAPSULE ORAL
COMMUNITY
Start: 2024-12-06

## 2025-02-03 SDOH — ECONOMIC STABILITY: FOOD INSECURITY: WITHIN THE PAST 12 MONTHS, YOU WORRIED THAT YOUR FOOD WOULD RUN OUT BEFORE YOU GOT MONEY TO BUY MORE.: NEVER TRUE

## 2025-02-03 SDOH — ECONOMIC STABILITY: FOOD INSECURITY: WITHIN THE PAST 12 MONTHS, THE FOOD YOU BOUGHT JUST DIDN'T LAST AND YOU DIDN'T HAVE MONEY TO GET MORE.: NEVER TRUE

## 2025-02-03 ASSESSMENT — PATIENT HEALTH QUESTIONNAIRE - PHQ9
SUM OF ALL RESPONSES TO PHQ QUESTIONS 1-9: 0
2. FEELING DOWN, DEPRESSED OR HOPELESS: NOT AT ALL
1. LITTLE INTEREST OR PLEASURE IN DOING THINGS: NOT AT ALL
SUM OF ALL RESPONSES TO PHQ9 QUESTIONS 1 & 2: 0

## 2025-02-03 NOTE — PROGRESS NOTES
INTERNISTS OF [facility]:  2/9/2025, MRN: [id number]      Suyapa Gonsales is a 75 y.o. female and presents to clinic for Follow-up      Subjective:   The pt is a 74 yo female with h/o hypothyroidism, NAFLD, B12 deficiency, GERD, prediabetes, left rotator cuff tendinopathy, diverticulitis per EHR, s/p abx from left knee infection after a knee replacement surgery, OA, pseudogout, lumbar spinal stenosis, asthma, and allergic rhinitis.     1. Prediabetes/Hyperglycemia: Recent labs show an A1C of 5.8. Her A1C worsened.    2. Sciatica: Worsening at the time of her last apt. Today she reports: she does not take zanaflex. She can only tolerate 2mg dosing and rarely takes this. Her lower back pain responds well to injections along her back.  She walks her dog every day. +Lumbar spinal stenosis. +Followed by Orthopedics. She takes aleve prn. Her hand hurts this morning. She used to see  ([facility]) for pseudogout. She uses voltaren topical rx prn.     3. HLD: Improved. On zetia 10mg daily. +H/o statin intolerance. She ate poorly during the holidays. She is back in to her \"better eating habits.\"     Latest Reference Range & Units 01/12/24 14:06 01/27/25 09:46   Cholesterol, Total <200 MG/ (H) 234 (H)   HDL Cholesterol 40 - 60 MG/DL 85 (H) 78 (H)   LDL Cholesterol 0 - 100 MG/.6 (H) 122.4 (H)   Triglycerides <150 MG/ 168 (H)   (H): Data is abnormally high    4. Hypothyroidism: On synthoid 125mcg daily.         Patient Active Problem List    Diagnosis Date Noted    Osteopenia 08/31/2021    History of non anemic vitamin B12 deficiency 08/21/2020    Menopausal symptoms 08/21/2020    KAUFFMAN (nonalcoholic steatohepatitis) 08/21/2020    Lumbar facet arthropathy 08/21/2020    Vitamin B12 deficiency 08/14/2017    Hypothyroidism due to Hashimoto's thyroiditis 02/14/2017    Gastroesophageal reflux disease without esophagitis 02/14/2017    History of diverticulitis 10/13/2016    Hyperlipidemia LDL

## 2025-02-03 NOTE — PROGRESS NOTES
Suyapa Gonsales presents today for   Chief Complaint   Patient presents with    Follow-up     BP recheck 135/85      \"Have you been to the ER, urgent care clinic since your last visit?  Hospitalized since your last visit?\"    NO    “Have you seen or consulted any other health care providers outside of [facility] since your last visit?”    Yes Dr.allen olson GI

## 2025-02-19 DIAGNOSIS — E78.5 HYPERLIPIDEMIA, UNSPECIFIED HYPERLIPIDEMIA TYPE: ICD-10-CM

## 2025-02-19 RX ORDER — EZETIMIBE 10 MG/1
10 TABLET ORAL DAILY
Qty: 90 TABLET | Refills: 1 | Status: SHIPPED | OUTPATIENT
Start: 2025-02-19

## 2025-02-19 NOTE — TELEPHONE ENCOUNTER
PCP: Brenda Washington MD    Last appt: 2/3/2025       Future Appointments   Date Time Provider Department Center   7/28/2025 11:00 AM [facility] LAB VISIT [facility] DEP   8/4/2025 11:20 AM Brenda Washington MD [facility] DEP   2/23/2026 11:00 AM [facility] MAMMO 2 [facility]       Requested Prescriptions     Pending Prescriptions Disp Refills    ezetimibe (ZETIA) 10 MG tablet 90 tablet 1     Sig: Take 1 tablet by mouth daily

## 2025-06-17 RX ORDER — ESTRADIOL 1 MG/1
1 TABLET ORAL DAILY
Qty: 90 TABLET | Refills: 1 | OUTPATIENT
Start: 2025-06-17

## 2025-06-17 RX ORDER — ESTRADIOL 1 MG/1
1 TABLET ORAL DAILY
Qty: 90 TABLET | Refills: 1 | Status: SHIPPED | OUTPATIENT
Start: 2025-06-17

## 2025-07-14 RX ORDER — LEVOTHYROXINE SODIUM 125 UG/1
125 TABLET ORAL
Qty: 90 TABLET | Refills: 1 | Status: SHIPPED | OUTPATIENT
Start: 2025-07-14

## 2025-07-28 ENCOUNTER — HOSPITAL ENCOUNTER (OUTPATIENT)
Facility: HOSPITAL | Age: 75
Setting detail: SPECIMEN
Discharge: HOME | End: 2025-07-31
Payer: MEDICARE

## 2025-07-28 DIAGNOSIS — R73.9 HYPERGLYCEMIA, UNSPECIFIED: ICD-10-CM

## 2025-07-28 DIAGNOSIS — E78.5 HYPERLIPIDEMIA, UNSPECIFIED HYPERLIPIDEMIA TYPE: ICD-10-CM

## 2025-07-28 DIAGNOSIS — E06.3 HYPOTHYROIDISM DUE TO HASHIMOTO'S THYROIDITIS: ICD-10-CM

## 2025-07-28 LAB
ALBUMIN SERPL-MCNC: 3.7 G/DL (ref 3.4–5)
ALBUMIN/GLOB SERPL: 1.3 (ref 0.8–1.7)
ALP SERPL-CCNC: 65 U/L (ref 45–117)
ALT SERPL-CCNC: 15 U/L (ref 10–35)
ANION GAP SERPL CALC-SCNC: 12 MMOL/L (ref 3–18)
AST SERPL-CCNC: 21 U/L (ref 10–38)
BASOPHILS # BLD: 0.06 K/UL (ref 0–0.1)
BASOPHILS NFR BLD: 0.7 % (ref 0–2)
BILIRUB SERPL-MCNC: 0.3 MG/DL (ref 0.2–1)
BUN SERPL-MCNC: 14 MG/DL (ref 6–23)
BUN/CREAT SERPL: 18 (ref 12–20)
CALCIUM SERPL-MCNC: 9.6 MG/DL (ref 8.5–10.1)
CHLORIDE SERPL-SCNC: 104 MMOL/L (ref 98–107)
CHOLEST SERPL-MCNC: 214 MG/DL
CO2 SERPL-SCNC: 24 MMOL/L (ref 21–32)
CREAT SERPL-MCNC: 0.75 MG/DL (ref 0.6–1.3)
DIFFERENTIAL METHOD BLD: ABNORMAL
EOSINOPHIL # BLD: 0.15 K/UL (ref 0–0.4)
EOSINOPHIL NFR BLD: 1.8 % (ref 0–5)
ERYTHROCYTE [DISTWIDTH] IN BLOOD BY AUTOMATED COUNT: 12.7 % (ref 11.6–14.5)
EST. AVERAGE GLUCOSE BLD GHB EST-MCNC: 120 MG/DL
GLOBULIN SER CALC-MCNC: 2.8 G/DL (ref 2–4)
GLUCOSE SERPL-MCNC: 95 MG/DL (ref 74–108)
HBA1C MFR BLD: 5.8 % (ref 4.2–5.6)
HCT VFR BLD AUTO: 38.5 % (ref 35–45)
HDLC SERPL-MCNC: 62 MG/DL (ref 40–60)
HDLC SERPL: 3.4 (ref 0–5)
HGB BLD-MCNC: 12.8 G/DL (ref 12–16)
IMM GRANULOCYTES # BLD AUTO: 0.04 K/UL (ref 0–0.04)
IMM GRANULOCYTES NFR BLD AUTO: 0.5 % (ref 0–0.5)
LDLC SERPL CALC-MCNC: 116 MG/DL (ref 0–100)
LYMPHOCYTES # BLD: 2.72 K/UL (ref 0.9–3.6)
LYMPHOCYTES NFR BLD: 32.2 % (ref 21–52)
MCH RBC QN AUTO: 31.4 PG (ref 24–34)
MCHC RBC AUTO-ENTMCNC: 33.2 G/DL (ref 31–37)
MCV RBC AUTO: 94.6 FL (ref 78–100)
MONOCYTES # BLD: 0.71 K/UL (ref 0.05–1.2)
MONOCYTES NFR BLD: 8.4 % (ref 3–10)
NEUTS SEG # BLD: 4.76 K/UL (ref 1.8–8)
NEUTS SEG NFR BLD: 56.4 % (ref 40–73)
NRBC # BLD: 0 K/UL (ref 0–0.01)
NRBC BLD-RTO: 0 PER 100 WBC
PLATELET # BLD AUTO: 318 K/UL (ref 135–420)
PMV BLD AUTO: 11.1 FL (ref 9.2–11.8)
POTASSIUM SERPL-SCNC: 4.6 MMOL/L (ref 3.5–5.5)
PROT SERPL-MCNC: 6.6 G/DL (ref 6.4–8.2)
RBC # BLD AUTO: 4.07 M/UL (ref 4.2–5.3)
SODIUM SERPL-SCNC: 141 MMOL/L (ref 136–145)
T4 FREE SERPL-MCNC: 1.4 NG/DL (ref 0.9–1.7)
TRIGL SERPL-MCNC: 180 MG/DL (ref 0–150)
TSH, 3RD GENERATION: 0.65 UIU/ML (ref 0.27–4.2)
VLDLC SERPL CALC-MCNC: 36 MG/DL
WBC # BLD AUTO: 8.4 K/UL (ref 4.6–13.2)

## 2025-07-28 PROCEDURE — 80061 LIPID PANEL: CPT

## 2025-07-28 PROCEDURE — 84443 ASSAY THYROID STIM HORMONE: CPT

## 2025-07-28 PROCEDURE — 84439 ASSAY OF FREE THYROXINE: CPT

## 2025-07-28 PROCEDURE — 36415 COLL VENOUS BLD VENIPUNCTURE: CPT

## 2025-07-28 PROCEDURE — 83036 HEMOGLOBIN GLYCOSYLATED A1C: CPT

## 2025-07-28 PROCEDURE — 85025 COMPLETE CBC W/AUTO DIFF WBC: CPT

## 2025-07-28 PROCEDURE — 80053 COMPREHEN METABOLIC PANEL: CPT

## 2025-08-04 ENCOUNTER — OFFICE VISIT (OUTPATIENT)
Facility: CLINIC | Age: 75
End: 2025-08-04

## 2025-08-04 VITALS
DIASTOLIC BLOOD PRESSURE: 77 MMHG | HEIGHT: 59 IN | RESPIRATION RATE: 18 BRPM | BODY MASS INDEX: 33.95 KG/M2 | HEART RATE: 74 BPM | SYSTOLIC BLOOD PRESSURE: 138 MMHG | OXYGEN SATURATION: 97 % | WEIGHT: 168.4 LBS | TEMPERATURE: 98.6 F

## 2025-08-04 DIAGNOSIS — Z00.00 MEDICARE ANNUAL WELLNESS VISIT, SUBSEQUENT: ICD-10-CM

## 2025-08-04 DIAGNOSIS — R73.9 HYPERGLYCEMIA, UNSPECIFIED: Primary | ICD-10-CM

## 2025-08-04 DIAGNOSIS — E06.3 HYPOTHYROIDISM DUE TO HASHIMOTO'S THYROIDITIS: ICD-10-CM

## 2025-08-04 DIAGNOSIS — M54.30 SCIATICA, UNSPECIFIED LATERALITY: ICD-10-CM

## 2025-08-04 DIAGNOSIS — E78.5 HYPERLIPIDEMIA, UNSPECIFIED HYPERLIPIDEMIA TYPE: ICD-10-CM

## 2025-08-04 DIAGNOSIS — Z12.31 ENCOUNTER FOR SCREENING MAMMOGRAM FOR BREAST CANCER: ICD-10-CM

## 2025-08-04 DIAGNOSIS — Z71.89 ADVANCE CARE PLANNING: ICD-10-CM

## 2025-08-04 RX ORDER — EZETIMIBE 10 MG/1
10 TABLET ORAL DAILY
Qty: 90 TABLET | Refills: 3 | Status: SHIPPED | OUTPATIENT
Start: 2025-08-04

## 2025-08-04 RX ORDER — PREDNISONE 20 MG/1
40 TABLET ORAL DAILY
Qty: 10 TABLET | Refills: 0 | Status: SHIPPED | OUTPATIENT
Start: 2025-08-04 | End: 2025-08-09

## 2025-08-04 RX ORDER — MAGNESIUM GLYCINATE 100 MG
1000 CAPSULE ORAL NIGHTLY
COMMUNITY

## 2025-08-26 DIAGNOSIS — E78.5 HYPERLIPIDEMIA, UNSPECIFIED HYPERLIPIDEMIA TYPE: ICD-10-CM

## 2025-08-26 RX ORDER — EZETIMIBE 10 MG/1
10 TABLET ORAL DAILY
Qty: 90 TABLET | Refills: 3 | Status: SHIPPED | OUTPATIENT
Start: 2025-08-26